# Patient Record
Sex: FEMALE | Race: WHITE | NOT HISPANIC OR LATINO | Employment: OTHER | URBAN - METROPOLITAN AREA
[De-identification: names, ages, dates, MRNs, and addresses within clinical notes are randomized per-mention and may not be internally consistent; named-entity substitution may affect disease eponyms.]

---

## 2017-01-20 RX ORDER — BUPROPION HYDROCHLORIDE 300 MG/1
300 TABLET ORAL EVERY MORNING
Qty: 90 TAB | Refills: 0 | Status: SHIPPED | OUTPATIENT
Start: 2017-01-20 | End: 2017-01-23 | Stop reason: SDUPTHER

## 2017-01-23 RX ORDER — BUPROPION HYDROCHLORIDE 300 MG/1
300 TABLET ORAL EVERY MORNING
Qty: 90 TAB | Refills: 0 | Status: SHIPPED | OUTPATIENT
Start: 2017-01-23 | End: 2017-04-12 | Stop reason: SDUPTHER

## 2017-03-08 ENCOUNTER — OFFICE VISIT (OUTPATIENT)
Dept: BEHAVIORAL HEALTH | Facility: PHYSICIAN GROUP | Age: 52
End: 2017-03-08
Payer: MEDICARE

## 2017-03-08 VITALS — DIASTOLIC BLOOD PRESSURE: 90 MMHG | SYSTOLIC BLOOD PRESSURE: 150 MMHG | HEART RATE: 70 BPM

## 2017-03-08 DIAGNOSIS — F90.9 ATTENTION DEFICIT HYPERACTIVITY DISORDER (ADHD), UNSPECIFIED ADHD TYPE: ICD-10-CM

## 2017-03-08 DIAGNOSIS — F31.81 BIPOLAR II DISORDER (HCC): ICD-10-CM

## 2017-03-08 PROCEDURE — G8484 FLU IMMUNIZE NO ADMIN: HCPCS | Performed by: NURSE PRACTITIONER

## 2017-03-08 PROCEDURE — 3017F COLORECTAL CA SCREEN DOC REV: CPT | Mod: 8P | Performed by: NURSE PRACTITIONER

## 2017-03-08 PROCEDURE — 90833 PSYTX W PT W E/M 30 MIN: CPT | Performed by: NURSE PRACTITIONER

## 2017-03-08 PROCEDURE — G8419 CALC BMI OUT NRM PARAM NOF/U: HCPCS | Performed by: NURSE PRACTITIONER

## 2017-03-08 PROCEDURE — 3014F SCREEN MAMMO DOC REV: CPT | Performed by: NURSE PRACTITIONER

## 2017-03-08 PROCEDURE — 1036F TOBACCO NON-USER: CPT | Performed by: NURSE PRACTITIONER

## 2017-03-08 PROCEDURE — 99213 OFFICE O/P EST LOW 20 MIN: CPT | Performed by: NURSE PRACTITIONER

## 2017-03-08 PROCEDURE — G8432 DEP SCR NOT DOC, RNG: HCPCS | Performed by: NURSE PRACTITIONER

## 2017-03-08 RX ORDER — LAMOTRIGINE 25 MG/1
TABLET ORAL
Qty: 42 TAB | Refills: 1 | Status: SHIPPED | OUTPATIENT
Start: 2017-03-08 | End: 2017-04-12

## 2017-03-08 RX ORDER — DEXTROAMPHETAMINE SACCHARATE, AMPHETAMINE ASPARTATE, DEXTROAMPHETAMINE SULFATE AND AMPHETAMINE SULFATE 2.5; 2.5; 2.5; 2.5 MG/1; MG/1; MG/1; MG/1
10 TABLET ORAL 2 TIMES DAILY
Qty: 60 TAB | Refills: 0 | Status: SHIPPED | OUTPATIENT
Start: 2017-03-08 | End: 2017-04-12 | Stop reason: SDUPTHER

## 2017-03-08 RX ORDER — TRAZODONE HYDROCHLORIDE 50 MG/1
50 TABLET ORAL NIGHTLY PRN
Qty: 90 TAB | Refills: 0 | Status: SHIPPED | OUTPATIENT
Start: 2017-03-08 | End: 2017-04-12

## 2017-03-08 NOTE — PROGRESS NOTES
[facility]  PSYCHIATRIC FOLLOW-UP NOTE    Name: Frances Meyer  MRN: [id number]  : 1965  Age: 51 y.o.  Date of assessment: 3/8/2017  PCP: GABI Borja  Persons in attendance: Patient    REASON FOR VISIT/CHIEF COMPLAINT (as stated by Patient):  Frances Meyer is a 51 y.o., White female, attending follow-up appointment for bipolar disorder and ADHD, having worsening depression.      HISTORY OF PRESENT ILLNESS:    Patient reports she has been feeling very depressed, has evolved over the past few weeks. Low energy and motivation, is not wanting to do like doing anything, isolating more. Concerned she will need to add more medication, verbalizes her fiance is anti-medication and she would like not to need medication. Denies suicidal thoughts. Crying often, again is feeling hopeless. Denies any life changes or stressors that have provoked the depression. Still taking Wellbutrin XL, trazodone, and Adderall.     PSYCHOSOCIAL CHANGES SINCE PREVIOUS CONTACT:  No drinking or drug use    MEDICATION SIDE EFFECTS: none    REVIEW OF SYSTEMS:      General appearance: casually dressed  female, good grooming/hygiene. Appears stated age. Pleasant and cooperative.   Constitutional negative - no fever/chills   Eyes not tested   Ears/Nose/Mouth/Throat not tested   Cardiovascular positive - hypertension today   Respiratory not tested   Gastrointestinal negative - denies reduced appetite or GI upset   Genitourinary not tested   Muscular not tested   Integumentary negative   Neurological negative   Endocrine not tested   Hematologic/Lymphatic not tested       PSYCHIATRIC EXAMINATION/MENTAL STATUS  /90 mmHg  Pulse 70  Breastfeeding? No  Participation: Active verbal participation, Attentive, Engaged and Open to feedback  Grooming:Casual and Neat  Orientation: Fully Oriented  Eye contact: Good  Behavior:Tense   Mood: Depressed  Affect: Congruent with content and Tearful  Thought process:  Logical and Goal-directed  Thought content:  Within normal limits  Speech: Rate within normal limits and Volume within normal limits  Perception:  Within normal limits  Memory:  No gross evidence of memory deficits  Insight: Good  Judgment: Good  Family/couple interaction observations:   Other:    Current risk:    Suicide: Low   Homicide: Not applicable   Self-harm: Not applicable  Relapse: Not applicable  Other:   Crisis Safety Plan reviewed?911/ER for suicidal thoughts  If evidence of imminent risk is present, intervention/plan:    Medical Records/Labs/Diagnostic Tests Reviewed: none    Medical Records/Labs/Diagnostic Tests Ordered: none    DIAGNOSTIC IMPRESSION(S):  1. Bipolar II disorder (CMS-[facility])    2. Attention deficit hyperactivity disorder (ADHD), unspecified ADHD type           ASSESSMENT AND PLAN:  -Decrease dose of Adderall to BID only, patient has now had elevated bp readings at 2 visits; referred to PCP for treatment  -start lamotrigine 25 mg daily, increase to 50 mg daily in 2 weeks. Risks/benefits discussed, patient aware of risk of Stanton Franklin Syndrome, will monitor for rash. Patient took before, was stopped only because she wanted to get off meds.   -rationale for a mood stabilizer explained instead of adding another antidepressant as patient has a bipolar diagnosis  -patient encouraged to start individual talk therapy, can schedule with one of our providers or can look outside within insurance network for earlier availability  -911/ER for suicidal thoughts      Current outpatient prescriptions:   •  trazodone (DESYREL) 50 MG Tab, Take 1 Tab by mouth at bedtime as needed for Sleep (as needed for sleep)., Disp: 90 Tab, Rfl: 0  •  amphetamine-dextroamphetamine (ADDERALL, 10MG,) 10 MG Tab, Take 1 Tab by mouth 2 times a day., Disp: 60 Tab, Rfl: 0  •  lamotrigine (LAMICTAL) 25 MG Tab, Take 1 tab by mouth daily for 2 weeks, then increase to 2 tabs by mouth daily., Disp: 42 Tab, Rfl: 1  •  buPROPion  (WELLBUTRIN XL) 300 MG XL tablet, Take 1 Tab by mouth every morning., Disp: 90 Tab, Rfl: 0  •  levothyroxine (SYNTHROID) 100 MCG TABS, Take 100 mcg by mouth every day., Disp: , Rfl:   •  sumatriptan (IMITREX) 100 MG tablet, Take 100 mg by mouth Once PRN., Disp: , Rfl:       Psychotherapy, 16 minutes: reviewed concept that depression/bipolar disorder should be considered as a physical illness, cannot always make lifestyle changes to relieve it. Patient encouraged to bring in fiance for next visit, can help explain the chemical nature of mental illness. Discussed recurrent depression is not a personal failure, she has not done anything wrong.         Topics addressed include:    DANIEL Hurst.

## 2017-04-12 ENCOUNTER — OFFICE VISIT (OUTPATIENT)
Dept: BEHAVIORAL HEALTH | Facility: PHYSICIAN GROUP | Age: 52
End: 2017-04-12
Payer: MEDICARE

## 2017-04-12 VITALS
DIASTOLIC BLOOD PRESSURE: 86 MMHG | HEART RATE: 89 BPM | WEIGHT: 183 LBS | HEIGHT: 68 IN | BODY MASS INDEX: 27.74 KG/M2 | SYSTOLIC BLOOD PRESSURE: 138 MMHG

## 2017-04-12 DIAGNOSIS — F90.9 ATTENTION DEFICIT HYPERACTIVITY DISORDER (ADHD), UNSPECIFIED ADHD TYPE: ICD-10-CM

## 2017-04-12 DIAGNOSIS — F31.81 BIPOLAR II DISORDER (HCC): ICD-10-CM

## 2017-04-12 PROCEDURE — G8419 CALC BMI OUT NRM PARAM NOF/U: HCPCS | Performed by: NURSE PRACTITIONER

## 2017-04-12 PROCEDURE — 99213 OFFICE O/P EST LOW 20 MIN: CPT | Performed by: NURSE PRACTITIONER

## 2017-04-12 PROCEDURE — 3014F SCREEN MAMMO DOC REV: CPT | Performed by: NURSE PRACTITIONER

## 2017-04-12 PROCEDURE — G8432 DEP SCR NOT DOC, RNG: HCPCS | Performed by: NURSE PRACTITIONER

## 2017-04-12 PROCEDURE — 3017F COLORECTAL CA SCREEN DOC REV: CPT | Mod: 8P | Performed by: NURSE PRACTITIONER

## 2017-04-12 PROCEDURE — 90833 PSYTX W PT W E/M 30 MIN: CPT | Performed by: NURSE PRACTITIONER

## 2017-04-12 PROCEDURE — 1036F TOBACCO NON-USER: CPT | Performed by: NURSE PRACTITIONER

## 2017-04-12 RX ORDER — TRAZODONE HYDROCHLORIDE 100 MG/1
50-100 TABLET ORAL NIGHTLY PRN
Qty: 30 TAB | Refills: 2 | Status: SHIPPED | OUTPATIENT
Start: 2017-04-12 | End: 2017-04-24 | Stop reason: SDUPTHER

## 2017-04-12 RX ORDER — LAMOTRIGINE 100 MG/1
100 TABLET ORAL DAILY
Qty: 30 TAB | Refills: 2 | Status: SHIPPED | OUTPATIENT
Start: 2017-04-12 | End: 2017-04-24 | Stop reason: SDUPTHER

## 2017-04-12 RX ORDER — BUPROPION HYDROCHLORIDE 300 MG/1
300 TABLET ORAL EVERY MORNING
Qty: 90 TAB | Refills: 0 | Status: SHIPPED | OUTPATIENT
Start: 2017-04-12 | End: 2017-05-17 | Stop reason: SDUPTHER

## 2017-04-12 RX ORDER — DEXTROAMPHETAMINE SACCHARATE, AMPHETAMINE ASPARTATE, DEXTROAMPHETAMINE SULFATE AND AMPHETAMINE SULFATE 2.5; 2.5; 2.5; 2.5 MG/1; MG/1; MG/1; MG/1
10 TABLET ORAL 2 TIMES DAILY
Qty: 60 TAB | Refills: 0 | Status: SHIPPED | OUTPATIENT
Start: 2017-04-12 | End: 2017-05-17 | Stop reason: SDUPTHER

## 2017-04-12 NOTE — PROGRESS NOTES
"[facility]  PSYCHIATRIC FOLLOW-UP NOTE    Name: Frances Meyer  MRN: [id number]  : 1965  Age: 51 y.o.  Date of assessment: 2017  PCP: GABI Borja  Persons in attendance: Patient    REASON FOR VISIT/CHIEF COMPLAINT (as stated by Patient):  Frances Meyer is a 51 y.o., White female, attending follow-up appointment for bipolar depression, ADHD.      HISTORY OF PRESENT ILLNESS:    Patient's mood is improving, reports the past month has been rough with depression but she is again getting out of the house and feeling more motivated. Denies suicidal thoughts. Sleep has been a problem, fiance works at night and she has ended up sleeping some during the day because she is not sleeping well at night and is exhausted. Tolerating lamotrigine well. Notices decreased effectiveness with reduced Adderall dose, but is managing with focus okay on the 10 mg BID. States her mood is not great, but that she has definitely seen improvement.     PSYCHOSOCIAL CHANGES SINCE PREVIOUS CONTACT:  No drinking or drug use    MEDICATION SIDE EFFECTS: denies    REVIEW OF SYSTEMS:    General appearance:  female,  Good grooming/hygiene. Pleasant and cooperative.   Constitutional negative - no fever/chills   Eyes not tested   Ears/Nose/Mouth/Throat positive - upper respiratory infection   Cardiovascular negative   Respiratory positive - cough, reports viral   Gastrointestinal negative   Genitourinary nottested   Muscular negative   Integumentary Denies rash   Neurological negative   Endocrine nottested   Hematologic/Lymphatic Not tested       PSYCHIATRIC EXAMINATION/MENTAL STATUS  /86 mmHg  Pulse 89  Ht 1.727 m (5' 7.99\")  Wt 83.008 kg (183 lb)  BMI 27.83 kg/m2  Breastfeeding? No  Participation: Active verbal participation, Attentive, Engaged and Open to feedback  Grooming:Casual and Neat  Orientation: Fully Oriented  Eye contact: Good  Behavior:Calm   Mood: Euthymic  Affect: Full range and " Congruent with content  Thought process: Logical and Goal-directed  Thought content:  Within normal limits  Speech: Rate within normal limits and Volume within normal limits  Perception:  Within normal limits  Memory:  No gross evidence of memory deficits  Insight: Good  Judgment: Good  Family/couple interaction observations:   Other:    Current risk:    Suicide: Low   Homicide: Not applicable   Self-harm: Not applicable  Relapse: Not applicable  Other:   Crisis Safety Plan reviewed?911/ER for suicidal thoughts  If evidence of imminent risk is present, intervention/plan:    Medical Records/Labs/Diagnostic Tests Reviewed: none    Medical Records/Labs/Diagnostic Tests Ordered: none    DIAGNOSTIC IMPRESSION(S):  1. Bipolar II disorder (CMS-HCC)    2. Attention deficit hyperactivity disorder (ADHD), unspecified ADHD type           ASSESSMENT AND PLAN:  Increase lamotrigine, patient is tolerating well and depression is improving.   Increase trazodone to 100 mg at night, encourage patient to stay out of bed during day to reset her sleep schedule.  Continue Adderall 10 mg BID, will continue to monitor blood pressure.       Current outpatient prescriptions:   •  trazodone (DESYREL) 100 MG Tab, Take 0.5-1 Tabs by mouth at bedtime as needed for Sleep., Disp: 30 Tab, Rfl: 2  •  lamotrigine (LAMICTAL) 100 MG Tab, Take 1 Tab by mouth every day., Disp: 30 Tab, Rfl: 2  •  amphetamine-dextroamphetamine (ADDERALL, 10MG,) 10 MG Tab, Take 1 Tab by mouth 2 times a day., Disp: 60 Tab, Rfl: 0  •  buPROPion (WELLBUTRIN XL) 300 MG XL tablet, Take 1 Tab by mouth every morning., Disp: 90 Tab, Rfl: 0  •  Pseudoeph-Doxylamine-DM-APAP (NYQUIL PO), Take  by mouth., Disp: , Rfl:   •  levothyroxine (SYNTHROID) 100 MCG TABS, Take 100 mcg by mouth every day., Disp: , Rfl:   •  sumatriptan (IMITREX) 100 MG tablet, Take 100 mg by mouth Once PRN., Disp: , Rfl:       Psychotherapy, 16 minutes: processed she has not had contact with daughter since  December, is the longest time she goes. Patient would like to contact her, but does not feel she can accept rejection or get no response from daughter at this time. Looking forward to her wedding, focusing on wedding planning the next couple of months.      Topics addressed include:    DANIEL Hurst.

## 2017-04-24 RX ORDER — TRAZODONE HYDROCHLORIDE 100 MG/1
50-100 TABLET ORAL NIGHTLY PRN
Qty: 30 TAB | Refills: 2 | Status: SHIPPED | OUTPATIENT
Start: 2017-04-24 | End: 2017-07-18 | Stop reason: SDUPTHER

## 2017-04-24 RX ORDER — LAMOTRIGINE 100 MG/1
100 TABLET ORAL DAILY
Qty: 30 TAB | Refills: 2 | Status: SHIPPED | OUTPATIENT
Start: 2017-04-24 | End: 2017-05-17

## 2017-05-09 ENCOUNTER — OFFICE VISIT (OUTPATIENT)
Dept: MEDICAL GROUP | Facility: PHYSICIAN GROUP | Age: 52
End: 2017-05-09
Payer: MEDICARE

## 2017-05-09 VITALS
OXYGEN SATURATION: 94 % | HEART RATE: 94 BPM | DIASTOLIC BLOOD PRESSURE: 90 MMHG | BODY MASS INDEX: 29.35 KG/M2 | SYSTOLIC BLOOD PRESSURE: 146 MMHG | WEIGHT: 187 LBS | TEMPERATURE: 97.9 F | HEIGHT: 67 IN

## 2017-05-09 DIAGNOSIS — I10 ESSENTIAL HYPERTENSION: ICD-10-CM

## 2017-05-09 DIAGNOSIS — R73.01 ELEVATED FASTING GLUCOSE: ICD-10-CM

## 2017-05-09 DIAGNOSIS — E78.2 MIXED HYPERLIPIDEMIA: ICD-10-CM

## 2017-05-09 PROCEDURE — 3017F COLORECTAL CA SCREEN DOC REV: CPT | Mod: 8P | Performed by: FAMILY MEDICINE

## 2017-05-09 PROCEDURE — G8419 CALC BMI OUT NRM PARAM NOF/U: HCPCS | Performed by: FAMILY MEDICINE

## 2017-05-09 PROCEDURE — 99204 OFFICE O/P NEW MOD 45 MIN: CPT | Performed by: FAMILY MEDICINE

## 2017-05-09 PROCEDURE — 3014F SCREEN MAMMO DOC REV: CPT | Performed by: FAMILY MEDICINE

## 2017-05-09 PROCEDURE — 1036F TOBACCO NON-USER: CPT | Performed by: FAMILY MEDICINE

## 2017-05-09 PROCEDURE — G8432 DEP SCR NOT DOC, RNG: HCPCS | Performed by: FAMILY MEDICINE

## 2017-05-09 RX ORDER — LISINOPRIL 10 MG/1
10 TABLET ORAL DAILY
Qty: 90 TAB | Refills: 1 | Status: SHIPPED | OUTPATIENT
Start: 2017-05-09 | End: 2017-11-12 | Stop reason: SDUPTHER

## 2017-05-09 NOTE — ASSESSMENT & PLAN NOTE
Ongoing issue. Patient reports that she has had high blood pressure for many months now was never started on medication. Patient reports that she is very concerned because her blood pressure keeps going up every time she goes for another doctor's visit. She currently is denying any headache, dizziness, chest pain. Chart review shows that her blood pressure is above the recommended range for her age.

## 2017-05-09 NOTE — PROGRESS NOTES
Frances Meyer is a 51 y.o. female here for lipids; elevated glucose; HTN  HPI:  Patient is here today to establish care; she is a pleasant 51-year-old female who presents with the following concerns:  Mixed hyperlipidemia  Long-standing history. Patient reports that she was diagnosed with hyperlipidemia months ago but was never started on medication. She was told by previous provider that if she just got her diabetes under control that the cholesterol would fix itself. Patient is very concerned; she does not remember how high her cholesterol was.    Essential hypertension  Ongoing issue. Patient reports that she has had high blood pressure for many months now was never started on medication. Patient reports that she is very concerned because her blood pressure keeps going up every time she goes for another doctor's visit. She currently is denying any headache, dizziness, chest pain. Chart review shows that her blood pressure is above the recommended range for her age.    Elevated fasting glucose  Ongoing issue. Patient reports that she was actually diagnosed with diabetes but does not remember what her A1c was. She does her murmur fasting blood sugars high as 180. Again, her previous provider never put her on medication and told her to just make lifestyle adjustments first. She reports that she did make a strong attempt but only was able to get her blood sugar down to 140. She is very concerned because she is started as some visual changes along with some pins and needle sensation on the bottoms of both of her feet. She does have a family history of diabetes.    Current medicines (including changes today)  Current Outpatient Prescriptions   Medication Sig Dispense Refill   • lisinopril (PRINIVIL) 10 MG Tab Take 1 Tab by mouth every day. 90 Tab 1   • trazodone (DESYREL) 100 MG Tab Take 0.5-1 Tabs by mouth at bedtime as needed for Sleep. 30 Tab 2   • lamotrigine (LAMICTAL) 100 MG Tab Take 1 Tab by mouth every day.  "30 Tab 2   • amphetamine-dextroamphetamine (ADDERALL, 10MG,) 10 MG Tab Take 1 Tab by mouth 2 times a day. 60 Tab 0   • buPROPion (WELLBUTRIN XL) 300 MG XL tablet Take 1 Tab by mouth every morning. 90 Tab 0   • Pseudoeph-Doxylamine-DM-APAP (NYQUIL PO) Take  by mouth.     • levothyroxine (SYNTHROID) 100 MCG TABS Take 100 mcg by mouth every day.     • sumatriptan (IMITREX) 100 MG tablet Take 100 mg by mouth Once PRN.       No current facility-administered medications for this visit.     She  has a past medical history of Chronic back pain; Bipolar 1 disorder (CMS-[facility]); Depression; Migraine; Thyroid disease; ADD (attention deficit disorder); Insomnia; Diabetes (CMS-[facility]); and Bipolar disorder (CMS-[facility]).  She  has past surgical history that includes abdominal hysterectomy total and primary c section.  Social History   Substance Use Topics   • Smoking status: Former Smoker     Quit date: 08/23/2016   • Smokeless tobacco: Never Used   • Alcohol Use: Yes      Comment: holidays     Social History     Social History Narrative     Family History   Problem Relation Age of Onset   • Bipolar disorder Father    • Alcohol abuse Father    • Alcohol abuse Maternal Grandmother    • Diabetes Mother    • No Known Problems Sister    • Diabetes Maternal Grandfather      Family Status   Relation Status Death Age   • Father Alive    • Mother Alive    • Sister Alive          ROS  +pain on the bottom of both feet  All other systems reviewed and are negative     Objective:     Blood pressure 146/90, pulse 94, temperature 36.6 °C (97.9 °F), height 1.702 m (5' 7\"), weight 84.823 kg (187 lb), SpO2 94 %. Body mass index is 29.28 kg/(m^2).  Physical Exam:    Constitutional: Alert, no distress.  Skin: Warm, dry, good turgor, no rashes in visible areas.  Eye: Equal, round and reactive, conjunctiva clear, lids normal.  ENMT: Lips without lesions, good dentition, oropharynx clear.  Neck: Trachea midline, no masses, no thyromegaly. No cervical or " supraclavicular lymphadenopathy.  Respiratory: Unlabored respiratory effort, lungs clear to auscultation, no wheezes, no ronchi.  Cardiovascular: Normal S1, S2, no murmur, no edema.  Abdomen: Soft, non-tender, no masses, no hepatosplenomegaly.  Psych: Alert and oriented x3, normal affect and mood.  Neuro: CN 2-12 grossly intact      Assessment and Plan:   The following treatment plan was discussed     1. Elevated fasting glucose  COMP METABOLIC PANEL    HEMOGLOBIN A1C    VITAMIN D,25 HYDROXY    Uncontrolled. Send for labs and monitor for results. Will probably need to consider starting her on metformin at next visit   2. Mixed hyperlipidemia  LIPID PROFILE    Uncontrolled. Sent for labs and monitor for results. We'll need to consider statin therapy at next visit.   3. Essential hypertension  MICROALBUMIN CREAT RATIO URINE    Uncontrolled. Start patient on lisinopril 10 mg daily; monitor for tolerance and effect        Records requested.  Followup: Return in about 10 days (around 5/19/2017) for review labs/HTN, Short.

## 2017-05-09 NOTE — ASSESSMENT & PLAN NOTE
Long-standing history. Patient reports that she was diagnosed with hyperlipidemia months ago but was never started on medication. She was told by previous provider that if she just got her diabetes under control that the cholesterol would fix itself. Patient is very concerned; she does not remember how high her cholesterol was.

## 2017-05-09 NOTE — ASSESSMENT & PLAN NOTE
Ongoing issue. Patient reports that she was actually diagnosed with diabetes but does not remember what her A1c was. She does her murmur fasting blood sugars high as 180. Again, her previous provider never put her on medication and told her to just make lifestyle adjustments first. She reports that she did make a strong attempt but only was able to get her blood sugar down to 140. She is very concerned because she is started as some visual changes along with some pins and needle sensation on the bottoms of both of her feet. She does have a family history of diabetes.

## 2017-05-09 NOTE — MR AVS SNAPSHOT
"        Frances Betsy   2017 2:00 PM   Office Visit   MRN: [id number]    Department:  [facility]   Dept Phone:  [phone]    Description:  Female : 1965   Provider:  Sandrita Sam D.O.           Reason for Visit     Establish Care est care     Medication Management meds for diabetes & cholesterol & high blood pressure      Allergies as of 2017     No Known Allergies      You were diagnosed with     Elevated fasting glucose   [601592]       Mixed hyperlipidemia   [272.2.ICD-9-CM]       Essential hypertension   [5701496]         Vital Signs     Blood Pressure Pulse Temperature Height Weight Body Mass Index    146/90 mmHg 94 36.6 °C (97.9 °F) 1.702 m (5' 7\") 84.823 kg (187 lb) 29.28 kg/m2    Oxygen Saturation Smoking Status                94% Former Smoker          Basic Information     Date Of Birth Sex Race Ethnicity Preferred Language    1965 Female White Non- English      Your appointments     May 17, 2017  2:00 PM   Follow Up Med Management with CRISTINA Hurst   [facility] [address] [place] [address])    [address] [place] [id number]   [phone]            May 23, 2017  9:40 AM   Established Patient with Sandrita Sam D.O.   [address] [id number] [phone]           You will be receiving a confirmation call a few days before your appointment from our automated call confirmation system.              Problem List              ICD-10-CM Priority Class Noted - Resolved    Bipolar II disorder (CMS-HCC) F31.81   2016 - Present    Attention deficit hyperactivity disorder (ADHD) F90.9   2016 - Present    Elevated fasting glucose R73.01   2017 - Present    Mixed hyperlipidemia E78.2   2017 - Present    Essential hypertension I10   2017 - Present      Health Maintenance        Date Due Completion Dates    IMM DTaP/Tdap/Td Vaccine (1 - Tdap) 1982 ---    PAP SMEAR " 9/14/1984 ---    COLONOSCOPY 9/14/2013 ---    MAMMOGRAM 11/8/2017 11/8/2016, 10/24/2016            Current Immunizations     No immunizations on file.      Below and/or attached are the medications your provider expects you to take. Review all of your home medications and newly ordered medications with your provider and/or pharmacist. Follow medication instructions as directed by your provider and/or pharmacist. Please keep your medication list with you and share with your provider. Update the information when medications are discontinued, doses are changed, or new medications (including over-the-counter products) are added; and carry medication information at all times in the event of emergency situations     Allergies:  No Known Allergies          Medications  Valid as of: May 09, 2017 -  2:25 PM    Generic Name Brand Name Tablet Size Instructions for use    Amphetamine-Dextroamphetamine (Tab) ADDERALL 10 MG Take 1 Tab by mouth 2 times a day.        BuPROPion HCl (TABLET SR 24 HR) WELLBUTRIN  MG Take 1 Tab by mouth every morning.        LamoTRIgine (Tab) LAMICTAL 100 MG Take 1 Tab by mouth every day.        Levothyroxine Sodium (Tab) SYNTHROID 100 MCG Take 100 mcg by mouth every day.        Lisinopril (Tab) PRINIVIL 10 MG Take 1 Tab by mouth every day.        Pseudoeph-Doxylamine-DM-APAP   Take  by mouth.        SUMAtriptan Succinate (Tab) IMITREX 100 MG Take 100 mg by mouth Once PRN.        TraZODone HCl (Tab) DESYREL 100 MG Take 0.5-1 Tabs by mouth at bedtime as needed for Sleep.        .                 Medicines prescribed today were sent to:     [facility] #[id number] - JEANETTE RIOS - [address] [place] [address] [place] AT [address] [place] [address]    Phone: [phone] Fax: [phone]    Open 24 Hours?: No      Medication refill instructions:       If your prescription bottle indicates you have medication refills left, it is not necessary to call your provider’s office. Please contact  your pharmacy and they will refill your medication.    If your prescription bottle indicates you do not have any refills left, you may request refills at any time through one of the following ways: The online StyleSeek system (except Urgent Care), by calling your provider’s office, or by asking your pharmacy to contact your provider’s office with a refill request. Medication refills are processed only during regular business hours and may not be available until the next business day. Your provider may request additional information or to have a follow-up visit with you prior to refilling your medication.   *Please Note: Medication refills are assigned a new Rx number when refilled electronically. Your pharmacy may indicate that no refills were authorized even though a new prescription for the same medication is available at the pharmacy. Please request the medicine by name with the pharmacy before contacting your provider for a refill.        Your To Do List     Future Labs/Procedures Complete By Expires    COMP METABOLIC PANEL  As directed 5/10/2018    HEMOGLOBIN A1C  As directed 5/10/2018    LIPID PROFILE  As directed 5/10/2018    MICROALBUMIN CREAT RATIO URINE  As directed 5/10/2018    VITAMIN D,25 HYDROXY  As directed 5/10/2018         StyleSeek Access Code: [email or web address]  Expires: 5/25/2017  1:01 PM    StyleSeek  A secure, online tool to manage your health information     [employer]’s StyleSeek® is a secure, online tool that connects you to your personalized health information from the privacy of your home -- day or night - making it very easy for you to manage your healthcare. Once the activation process is completed, you can even access your medical information using the StyleSeek manuel, which is available for free in the Apple Manuel store or Google Play store.     StyleSeek provides the following levels of access (as shown below):   My Chart Features   [facility] Doctor [facility]  [facility]  Urgent  Care [facility]  Primary Care  Doctor   Email your healthcare team securely and privately 24/7 X X X    Manage appointments: schedule your next appointment; view details of past/upcoming appointments X      Request prescription refills. X      View recent personal medical records, including lab and immunizations X X X X   View health record, including health history, allergies, medications X X X X   Read reports about your outpatient visits, procedures, consult and ER notes X X X X   See your discharge summary, which is a recap of your hospital and/or ER visit that includes your diagnosis, lab results, and care plan. X X       How to register for [employer]:  1. Go to  [email or web address].  2. Click on the Sign Up Now box, which takes you to the New Member Sign Up page. You will need to provide the following information:  a. Enter your Goodoc Access Code exactly as it appears at the top of this page. (You will not need to use this code after you’ve completed the sign-up process. If you do not sign up before the expiration date, you must request a new code.)   b. Enter your date of birth.   c. Enter your home email address.   d. Click Submit, and follow the next screen’s instructions.  3. Create a Goodoc ID. This will be your Goodoc login ID and cannot be changed, so think of one that is secure and easy to remember.  4. Create a Goodoc password. You can change your password at any time.  5. Enter your Password Reset Question and Answer. This can be used at a later time if you forget your password.   6. Enter your e-mail address. This allows you to receive e-mail notifications when new information is available in Goodoc.  7. Click Sign Up. You can now view your health information.    For assistance activating your Good[employer] account, call [phone]

## 2017-05-17 ENCOUNTER — OFFICE VISIT (OUTPATIENT)
Dept: BEHAVIORAL HEALTH | Facility: PHYSICIAN GROUP | Age: 52
End: 2017-05-17
Payer: MEDICARE

## 2017-05-17 DIAGNOSIS — F31.81 BIPOLAR II DISORDER (HCC): ICD-10-CM

## 2017-05-17 DIAGNOSIS — F90.9 ATTENTION DEFICIT HYPERACTIVITY DISORDER (ADHD), UNSPECIFIED ADHD TYPE: ICD-10-CM

## 2017-05-17 PROCEDURE — 99213 OFFICE O/P EST LOW 20 MIN: CPT | Performed by: NURSE PRACTITIONER

## 2017-05-17 PROCEDURE — 90833 PSYTX W PT W E/M 30 MIN: CPT | Performed by: NURSE PRACTITIONER

## 2017-05-17 PROCEDURE — 3014F SCREEN MAMMO DOC REV: CPT | Performed by: NURSE PRACTITIONER

## 2017-05-17 PROCEDURE — G8419 CALC BMI OUT NRM PARAM NOF/U: HCPCS | Performed by: NURSE PRACTITIONER

## 2017-05-17 PROCEDURE — 1036F TOBACCO NON-USER: CPT | Performed by: NURSE PRACTITIONER

## 2017-05-17 PROCEDURE — G8432 DEP SCR NOT DOC, RNG: HCPCS | Performed by: NURSE PRACTITIONER

## 2017-05-17 PROCEDURE — 3017F COLORECTAL CA SCREEN DOC REV: CPT | Mod: 8P | Performed by: NURSE PRACTITIONER

## 2017-05-17 RX ORDER — LAMOTRIGINE 150 MG/1
150 TABLET ORAL DAILY
Qty: 90 TAB | Refills: 0 | Status: SHIPPED | OUTPATIENT
Start: 2017-05-17 | End: 2017-08-16

## 2017-05-17 RX ORDER — DEXTROAMPHETAMINE SACCHARATE, AMPHETAMINE ASPARTATE, DEXTROAMPHETAMINE SULFATE AND AMPHETAMINE SULFATE 2.5; 2.5; 2.5; 2.5 MG/1; MG/1; MG/1; MG/1
10 TABLET ORAL EVERY MORNING
Qty: 30 TAB | Refills: 0 | Status: SHIPPED | OUTPATIENT
Start: 2017-06-13 | End: 2017-08-16 | Stop reason: SDUPTHER

## 2017-05-17 RX ORDER — DEXTROAMPHETAMINE SACCHARATE, AMPHETAMINE ASPARTATE, DEXTROAMPHETAMINE SULFATE AND AMPHETAMINE SULFATE 2.5; 2.5; 2.5; 2.5 MG/1; MG/1; MG/1; MG/1
10 TABLET ORAL 2 TIMES DAILY
Qty: 60 TAB | Refills: 0 | Status: SHIPPED | OUTPATIENT
Start: 2017-05-17 | End: 2017-10-11

## 2017-05-17 RX ORDER — BUPROPION HYDROCHLORIDE 300 MG/1
TABLET ORAL
Qty: 90 TAB | Refills: 0 | Status: SHIPPED | OUTPATIENT
Start: 2017-05-17 | End: 2017-09-17 | Stop reason: SDUPTHER

## 2017-05-17 RX ORDER — DEXTROAMPHETAMINE SACCHARATE, AMPHETAMINE ASPARTATE, DEXTROAMPHETAMINE SULFATE AND AMPHETAMINE SULFATE 2.5; 2.5; 2.5; 2.5 MG/1; MG/1; MG/1; MG/1
10 TABLET ORAL EVERY MORNING
Qty: 30 TAB | Refills: 0 | Status: SHIPPED | OUTPATIENT
Start: 2017-05-13 | End: 2017-05-17 | Stop reason: SDUPTHER

## 2017-05-17 NOTE — PROGRESS NOTES
[facility]  PSYCHIATRIC FOLLOW-UP NOTE    Name: Frances Meyer  MRN: [id number]  : 1965  Age: 51 y.o.  Date of assessment: 2017  PCP: Sandrita Sam D.O.  Persons in attendance: Patient    REASON FOR VISIT/CHIEF COMPLAINT (as stated by Patient):  Frances Meyer is a 51 y.o., White female, attending follow-up appointment for bipolar disorder, ADHD.      HISTORY OF PRESENT ILLNESS:    Patient reports her mood has improved, no longer having bouts of crying or extreme emotions. Still having some lingering feelings of sadness however. Sleeping well at night. No problems with medications, is tolerating them well. Patient has concerns about running out of month supply of meds while on vacation. Getting excited about upcoming time with her friends, and her upcoming wedding. Still has some difficulty leaving the house, feels frustrated because nothing bad happens to her when she goes out, she just feels like she can't; verbalizes while on sertraline things were better with getting out but that she doesn't want to take sertraline again.     PSYCHOSOCIAL CHANGES SINCE PREVIOUS CONTACT:  No changes, getting  next month.      MEDICATION SIDE EFFECTS: denies    REVIEW OF SYSTEMS:  General appearance: casually dressed  female, good grooming/hygiene. Pleasant and cooperative, smiles often.       Constitutional negative - no fever/chills   Eyes not tested   Ears/Nose/Mouth/Throat not tested   Cardiovascular positive - taking lisinopril now for hypertension   Respiratory not tested   Gastrointestinal not tested   Genitourinary not tested   Muscular not tested   Integumentary No rash or skin concerns   Neurological negative   Endocrine not tested   Hematologic/Lymphatic not tested       PSYCHIATRIC EXAMINATION/MENTAL STATUS  There were no vitals taken for this visit.  Participation: Active verbal participation, Attentive, Engaged and Open to feedback  Grooming:Casual and  Neat  Orientation: Fully Oriented  Eye contact: Good  Behavior:Calm   Mood: Euthymic  Affect: Congruent with content  Thought process: Logical and Goal-directed  Thought content:  Within normal limits  Speech: Rate within normal limits and Volume within normal limits  Perception:  Within normal limits  Memory:  No gross evidence of memory deficits  Insight: Good  Judgment: Good  Family/couple interaction observations:   Other:    Current risk:    Suicide: Low   Homicide: Not applicable   Self-harm: Not applicable  Relapse: Not applicable  Other:   Crisis Safety Plan reviewed?n/a  If evidence of imminent risk is present, intervention/plan:    Medical Records/Labs/Diagnostic Tests Reviewed: none    Medical Records/Labs/Diagnostic Tests Ordered: none    DIAGNOSTIC IMPRESSION(S):  1. Bipolar II disorder (CMS-[facility])    2. Attention deficit hyperactivity disorder (ADHD), unspecified ADHD type           ASSESSMENT AND PLAN:  Increase lamotrigine to 150 mg daily for ongoing depression. Discussed with patient SSRIs would likely be useful for agoraphobic symptoms, but patient does not want to add another medication and did not like how she felt taking sertraline. Will order 90 days of lamotrigine and bupropion, patient reminded drinking alcohol with medications is not safe. Follow up in 2 months or sooner if needed.    Current Outpatient Prescriptions   Medication Sig Dispense Refill   • buPROPion (WELLBUTRIN XL) 300 MG XL tablet TAKE 1 TABLET BY MOUTH EVERY MORNING 90 Tab 0   • lamotrigine (LAMICTAL) 150 MG tablet Take 1 Tab by mouth every day. 90 Tab 0   • amphetamine-dextroamphetamine (ADDERALL, 10MG,) 10 MG Tab Take 1 Tab by mouth 2 times a day. 60 Tab 0   • [START ON 6/13/2017] amphetamine-dextroamphetamine (ADDERALL, 10MG,) 10 MG Tab Take 1 Tab by mouth every morning. 30 Tab 0   • lisinopril (PRINIVIL) 10 MG Tab Take 1 Tab by mouth every day. 90 Tab 1   • trazodone (DESYREL) 100 MG Tab Take 0.5-1 Tabs by mouth at bedtime  as needed for Sleep. 30 Tab 2   • levothyroxine (SYNTHROID) 100 MCG TABS Take 100 mcg by mouth every day.     • sumatriptan (IMITREX) 100 MG tablet Take 100 mg by mouth Once PRN.       No current facility-administered medications for this visit.         Psychotherapy, 20 minutes: patient processed feelings of anger, daughter did not acknowledge mother's day. Patient makes comparisons between relationship problems with her own mother in the past and where they are now with that of her and her own daughter.  Encourage patient to focus thoughts on things going well for her personally, not to dwell on her daughter as she cannot control her behavior.      Follow up in 2 months or sooner if needed      DANIEL Hurst.

## 2017-05-18 ENCOUNTER — HOSPITAL ENCOUNTER (OUTPATIENT)
Dept: LAB | Facility: MEDICAL CENTER | Age: 52
End: 2017-05-18
Attending: FAMILY MEDICINE
Payer: MEDICARE

## 2017-05-18 DIAGNOSIS — I10 ESSENTIAL HYPERTENSION: ICD-10-CM

## 2017-05-18 DIAGNOSIS — R73.01 ELEVATED FASTING GLUCOSE: ICD-10-CM

## 2017-05-18 DIAGNOSIS — E78.2 MIXED HYPERLIPIDEMIA: ICD-10-CM

## 2017-05-18 LAB
25(OH)D3 SERPL-MCNC: 59 NG/ML (ref 30–100)
ALBUMIN SERPL BCP-MCNC: 4.5 G/DL (ref 3.2–4.9)
ALBUMIN/GLOB SERPL: 2 G/DL
ALP SERPL-CCNC: 75 U/L (ref 30–99)
ALT SERPL-CCNC: 73 U/L (ref 2–50)
ANION GAP SERPL CALC-SCNC: 8 MMOL/L (ref 0–11.9)
AST SERPL-CCNC: 35 U/L (ref 12–45)
BILIRUB SERPL-MCNC: 0.6 MG/DL (ref 0.1–1.5)
BUN SERPL-MCNC: 12 MG/DL (ref 8–22)
CALCIUM SERPL-MCNC: 9.8 MG/DL (ref 8.5–10.5)
CHLORIDE SERPL-SCNC: 102 MMOL/L (ref 96–112)
CHOLEST SERPL-MCNC: 195 MG/DL (ref 100–199)
CO2 SERPL-SCNC: 26 MMOL/L (ref 20–33)
CREAT SERPL-MCNC: 0.84 MG/DL (ref 0.5–1.4)
CREAT UR-MCNC: 212.2 MG/DL
EST. AVERAGE GLUCOSE BLD GHB EST-MCNC: 140 MG/DL
GFR SERPL CREATININE-BSD FRML MDRD: >60 ML/MIN/1.73 M 2
GLOBULIN SER CALC-MCNC: 2.3 G/DL (ref 1.9–3.5)
GLUCOSE SERPL-MCNC: 112 MG/DL (ref 65–99)
HBA1C MFR BLD: 6.5 % (ref 0–5.6)
HDLC SERPL-MCNC: 41 MG/DL
LDLC SERPL CALC-MCNC: 131 MG/DL
MICROALBUMIN UR-MCNC: <0.7 MG/DL
MICROALBUMIN/CREAT UR: NORMAL MG/G (ref 0–30)
POTASSIUM SERPL-SCNC: 3.9 MMOL/L (ref 3.6–5.5)
PROT SERPL-MCNC: 6.8 G/DL (ref 6–8.2)
SODIUM SERPL-SCNC: 136 MMOL/L (ref 135–145)
TRIGL SERPL-MCNC: 117 MG/DL (ref 0–149)

## 2017-05-18 PROCEDURE — 80053 COMPREHEN METABOLIC PANEL: CPT

## 2017-05-18 PROCEDURE — 82043 UR ALBUMIN QUANTITATIVE: CPT

## 2017-05-18 PROCEDURE — 82570 ASSAY OF URINE CREATININE: CPT

## 2017-05-18 PROCEDURE — 36415 COLL VENOUS BLD VENIPUNCTURE: CPT

## 2017-05-18 PROCEDURE — 83036 HEMOGLOBIN GLYCOSYLATED A1C: CPT | Mod: GA

## 2017-05-18 PROCEDURE — 80061 LIPID PANEL: CPT

## 2017-05-18 PROCEDURE — 82306 VITAMIN D 25 HYDROXY: CPT | Mod: GA

## 2017-05-23 ENCOUNTER — OFFICE VISIT (OUTPATIENT)
Dept: MEDICAL GROUP | Facility: PHYSICIAN GROUP | Age: 52
End: 2017-05-23
Payer: MEDICARE

## 2017-05-23 VITALS
OXYGEN SATURATION: 97 % | HEART RATE: 77 BPM | HEIGHT: 67 IN | SYSTOLIC BLOOD PRESSURE: 118 MMHG | WEIGHT: 187 LBS | DIASTOLIC BLOOD PRESSURE: 78 MMHG | TEMPERATURE: 97.4 F | BODY MASS INDEX: 29.35 KG/M2

## 2017-05-23 DIAGNOSIS — E78.2 MIXED HYPERLIPIDEMIA: ICD-10-CM

## 2017-05-23 DIAGNOSIS — R79.89 ELEVATED LFTS: ICD-10-CM

## 2017-05-23 DIAGNOSIS — R73.01 ELEVATED FASTING GLUCOSE: ICD-10-CM

## 2017-05-23 DIAGNOSIS — I10 ESSENTIAL HYPERTENSION: ICD-10-CM

## 2017-05-23 DIAGNOSIS — M54.50 CHRONIC BILATERAL LOW BACK PAIN WITHOUT SCIATICA: ICD-10-CM

## 2017-05-23 DIAGNOSIS — G89.29 CHRONIC BILATERAL LOW BACK PAIN WITHOUT SCIATICA: ICD-10-CM

## 2017-05-23 PROCEDURE — 3014F SCREEN MAMMO DOC REV: CPT | Performed by: FAMILY MEDICINE

## 2017-05-23 PROCEDURE — 1036F TOBACCO NON-USER: CPT | Performed by: FAMILY MEDICINE

## 2017-05-23 PROCEDURE — 99214 OFFICE O/P EST MOD 30 MIN: CPT | Performed by: FAMILY MEDICINE

## 2017-05-23 PROCEDURE — 3017F COLORECTAL CA SCREEN DOC REV: CPT | Mod: 8P | Performed by: FAMILY MEDICINE

## 2017-05-23 PROCEDURE — G8419 CALC BMI OUT NRM PARAM NOF/U: HCPCS | Performed by: FAMILY MEDICINE

## 2017-05-23 PROCEDURE — G8432 DEP SCR NOT DOC, RNG: HCPCS | Performed by: FAMILY MEDICINE

## 2017-05-23 ASSESSMENT — PATIENT HEALTH QUESTIONNAIRE - PHQ9: CLINICAL INTERPRETATION OF PHQ2 SCORE: 0

## 2017-05-23 NOTE — ASSESSMENT & PLAN NOTE
Ongoing issue. Fasting blood sugar was at 112 with a hemoglobin A1c of 6.5. Patient reports that this is significantly improved from her previous blood work with another provider.

## 2017-05-23 NOTE — ASSESSMENT & PLAN NOTE
Ongoing issue. Patient reports 100% compliance with lisinopril 10 mg daily. Chart review shows that her blood pressure and heart rate both are in the normal range. She denies any issues with dry cough, headache, dizziness, chest pain
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
## 2017-05-23 NOTE — MR AVS SNAPSHOT
"        Frances Meyer   2017 9:40 AM   Office Visit   MRN: [id number]    Department:  [facility]   Dept Phone:  [phone]    Description:  Female : 1965   Provider:  Sandrita Sam D.O.           Reason for Visit     Follow-Up           Allergies as of 2017     No Known Allergies      You were diagnosed with     Mixed hyperlipidemia   [272.2.ICD-9-CM]       Elevated fasting glucose   [484584]       Essential hypertension   [1744080]       Elevated LFTs   [281966]       Chronic bilateral low back pain without sciatica   [7112669]         Vital Signs     Blood Pressure Pulse Temperature Height Weight Body Mass Index    118/78 mmHg 77 36.3 °C (97.4 °F) 1.702 m (5' 7\") 84.823 kg (187 lb) 29.28 kg/m2    Oxygen Saturation Smoking Status                97% Former Smoker          Basic Information     Date Of Birth Sex Race Ethnicity Preferred Language    1965 Female White Non- English      Your appointments     2017  3:00 PM   Follow Up Med Management with CRISTINA Hurst   [facility] [address] [place] [address])    [address] [place] [address]  [address] [place] [id number]   [phone]            Sep 25, 2017  4:40 PM   Established Patient with Sandrita Sam D.O.   [address] [id number] [phone]           You will be receiving a confirmation call a few days before your appointment from our automated call confirmation system.              Problem List              ICD-10-CM Priority Class Noted - Resolved    Bipolar II disorder (CMS-HCC) F31.81   2016 - Present    Attention deficit hyperactivity disorder (ADHD) F90.9   2016 - Present    Elevated fasting glucose R73.01   2017 - Present    Mixed hyperlipidemia E78.2   2017 - Present    Essential hypertension I10   2017 - Present    Elevated LFTs R94.5   2017 - Present    Chronic bilateral low back pain without sciatica M54.5, G89.29  "  5/23/2017 - Present      Health Maintenance        Date Due Completion Dates    IMM DTaP/Tdap/Td Vaccine (1 - Tdap) 9/14/1982 ---    PAP SMEAR 9/14/1984 ---    COLONOSCOPY 9/14/2013 ---    MAMMOGRAM 11/8/2017 11/8/2016, 10/24/2016            Current Immunizations     No immunizations on file.      Below and/or attached are the medications your provider expects you to take. Review all of your home medications and newly ordered medications with your provider and/or pharmacist. Follow medication instructions as directed by your provider and/or pharmacist. Please keep your medication list with you and share with your provider. Update the information when medications are discontinued, doses are changed, or new medications (including over-the-counter products) are added; and carry medication information at all times in the event of emergency situations     Allergies:  No Known Allergies          Medications  Valid as of: May 23, 2017 - 10:02 AM    Generic Name Brand Name Tablet Size Instructions for use    Amphetamine-Dextroamphetamine (Tab) ADDERALL 10 MG Take 1 Tab by mouth 2 times a day.        Amphetamine-Dextroamphetamine (Tab) ADDERALL 10 MG Take 1 Tab by mouth every morning.        BuPROPion HCl (TABLET SR 24 HR) WELLBUTRIN  MG TAKE 1 TABLET BY MOUTH EVERY MORNING        LamoTRIgine (Tab) LAMICTAL 150 MG Take 1 Tab by mouth every day.        Levothyroxine Sodium (Tab) SYNTHROID 100 MCG Take 100 mcg by mouth every day.        Lisinopril (Tab) PRINIVIL 10 MG Take 1 Tab by mouth every day.        SUMAtriptan Succinate (Tab) IMITREX 100 MG Take 100 mg by mouth Once PRN.        TraZODone HCl (Tab) DESYREL 100 MG Take 0.5-1 Tabs by mouth at bedtime as needed for Sleep.        .                 Medicines prescribed today were sent to:     [id number] - [place] - [address] AT [address] [place] [address]    Phone: [phone] Fax: [phone]    Open 24 Hours?:  No    [id number] - [place], [place] - [address]    [address] [place] [address]    Phone: [phone] Fax: [phone]    Open 24 Hours?: No      Medication refill instructions:       If your prescription bottle indicates you have medication refills left, it is not necessary to call your provider’s office. Please contact your pharmacy and they will refill your medication.    If your prescription bottle indicates you do not have any refills left, you may request refills at any time through one of the following ways: The online [employer] system (except Urgent Care), by calling your provider’s office, or by asking your pharmacy to contact your provider’s office with a refill request. Medication refills are processed only during regular business hours and may not be available until the next business day. Your provider may request additional information or to have a follow-up visit with you prior to refilling your medication.   *Please Note: Medication refills are assigned a new Rx number when refilled electronically. Your pharmacy may indicate that no refills were authorized even though a new prescription for the same medication is available at the pharmacy. Please request the medicine by name with the pharmacy before contacting your provider for a refill.        Your To Do List     Future Labs/Procedures Complete By Expires    COMP METABOLIC PANEL  8/1/2017 5/24/2018    HEMOGLOBIN A1C  8/1/2017 5/24/2018    LIPID PROFILE  8/1/2017 5/24/2018         [employer] Access Code: [email or web address]  Expires: 5/25/2017  1:01 PM    [employer]  A secure, online tool to manage your health information     [employer]® is a secure, online tool that connects you to your personalized health information from the privacy of your home -- day or night - making it very easy for you to manage your healthcare. Once the activation process is completed, you can even access your medical information using the [employer] sheridan,  which is available for free in the Apple Manuel store or Google Play store.     [employer] provides the following levels of access (as shown below):   My Chart Features   [facility] Doctor [facility]  Specialists [facility]  Urgent  Care Non-[facility]  Primary Care  Doctor   Email your healthcare team securely and privately 24/7 X X X    Manage appointments: schedule your next appointment; view details of past/upcoming appointments X      Request prescription refills. X      View recent personal medical records, including lab and immunizations X X X X   View health record, including health history, allergies, medications X X X X   Read reports about your outpatient visits, procedures, consult and ER notes X X X X   See your discharge summary, which is a recap of your hospital and/or ER visit that includes your diagnosis, lab results, and care plan. X X       How to register for [employer]:  1. Go to  [email or web address].  2. Click on the Sign Up Now box, which takes you to the New Member Sign Up page. You will need to provide the following information:  a. Enter your [employer] Access Code exactly as it appears at the top of this page. (You will not need to use this code after you’ve completed the sign-up process. If you do not sign up before the expiration date, you must request a new code.)   b. Enter your date of birth.   c. Enter your home email address.   d. Click Submit, and follow the next screen’s instructions.  3. Create a [employer] ID. This will be your [employer] login ID and cannot be changed, so think of one that is secure and easy to remember.  4. Create a [employer] password. You can change your password at any time.  5. Enter your Password Reset Question and Answer. This can be used at a later time if you forget your password.   6. Enter your e-mail address. This allows you to receive e-mail notifications when new information is available in [employer].  7. Click Sign Up. You can now view your health  information.    For assistance activating your [employer] account, call [phone]

## 2017-05-23 NOTE — ASSESSMENT & PLAN NOTE
Ongoing issue. Recent blood work shows a patient's lipid panel was within a normal range except for the LDL cholesterol which was at 131. She reports that she has made some dramatic dietary changes but has not started exercising yet.

## 2017-05-23 NOTE — ASSESSMENT & PLAN NOTE
New problem. Chart review shows that recent blood work reveals a mildly elevated ALTs. Patient denies any issues with right upper quadrant pain, skin changes, I color changes.
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
## 2017-07-18 RX ORDER — TRAZODONE HYDROCHLORIDE 100 MG/1
50-100 TABLET ORAL NIGHTLY PRN
Qty: 90 TAB | Refills: 0 | Status: SHIPPED | OUTPATIENT
Start: 2017-07-18 | End: 2017-10-11

## 2017-07-18 NOTE — TELEPHONE ENCOUNTER
Was the patient seen in the last year in this department? Yes     Does patient have an active prescription for medications requested? Yes     Received Request Via: Pharmacy       FYI: Pt has an appointment 7/27/17

## 2017-07-26 ENCOUNTER — APPOINTMENT (OUTPATIENT)
Dept: BEHAVIORAL HEALTH | Facility: PHYSICIAN GROUP | Age: 52
End: 2017-07-26
Payer: MEDICARE

## 2017-08-16 ENCOUNTER — OFFICE VISIT (OUTPATIENT)
Dept: BEHAVIORAL HEALTH | Facility: PHYSICIAN GROUP | Age: 52
End: 2017-08-16
Payer: MEDICARE

## 2017-08-16 VITALS — SYSTOLIC BLOOD PRESSURE: 137 MMHG | DIASTOLIC BLOOD PRESSURE: 86 MMHG | HEART RATE: 80 BPM | RESPIRATION RATE: 14 BRPM

## 2017-08-16 DIAGNOSIS — F90.9 ATTENTION DEFICIT HYPERACTIVITY DISORDER (ADHD), UNSPECIFIED ADHD TYPE: ICD-10-CM

## 2017-08-16 DIAGNOSIS — F31.81 BIPOLAR II DISORDER (HCC): ICD-10-CM

## 2017-08-16 PROCEDURE — 99213 OFFICE O/P EST LOW 20 MIN: CPT | Performed by: NURSE PRACTITIONER

## 2017-08-16 RX ORDER — DEXTROAMPHETAMINE SACCHARATE, AMPHETAMINE ASPARTATE, DEXTROAMPHETAMINE SULFATE AND AMPHETAMINE SULFATE 2.5; 2.5; 2.5; 2.5 MG/1; MG/1; MG/1; MG/1
10 TABLET ORAL 2 TIMES DAILY
Qty: 60 TAB | Refills: 0 | Status: SHIPPED | OUTPATIENT
Start: 2017-10-15 | End: 2017-10-11 | Stop reason: SDUPTHER

## 2017-08-16 RX ORDER — DEXTROAMPHETAMINE SACCHARATE, AMPHETAMINE ASPARTATE, DEXTROAMPHETAMINE SULFATE AND AMPHETAMINE SULFATE 2.5; 2.5; 2.5; 2.5 MG/1; MG/1; MG/1; MG/1
10 TABLET ORAL EVERY MORNING
Qty: 60 TAB | Refills: 0 | Status: SHIPPED | OUTPATIENT
Start: 2017-10-15 | End: 2017-08-16 | Stop reason: CLARIF

## 2017-08-16 RX ORDER — DEXTROAMPHETAMINE SACCHARATE, AMPHETAMINE ASPARTATE, DEXTROAMPHETAMINE SULFATE AND AMPHETAMINE SULFATE 2.5; 2.5; 2.5; 2.5 MG/1; MG/1; MG/1; MG/1
10 TABLET ORAL 2 TIMES DAILY
Qty: 60 TAB | Refills: 0 | Status: SHIPPED | OUTPATIENT
Start: 2017-09-15 | End: 2017-08-16 | Stop reason: SDUPTHER

## 2017-08-16 RX ORDER — DEXTROAMPHETAMINE SACCHARATE, AMPHETAMINE ASPARTATE, DEXTROAMPHETAMINE SULFATE AND AMPHETAMINE SULFATE 2.5; 2.5; 2.5; 2.5 MG/1; MG/1; MG/1; MG/1
10 TABLET ORAL 2 TIMES DAILY
Qty: 60 TAB | Refills: 0 | Status: SHIPPED | OUTPATIENT
Start: 2017-08-16 | End: 2017-10-11

## 2017-08-16 RX ORDER — LAMOTRIGINE 200 MG/1
200 TABLET ORAL DAILY
Qty: 90 TAB | Refills: 0 | Status: SHIPPED | OUTPATIENT
Start: 2017-08-16 | End: 2017-10-11

## 2017-08-16 RX ORDER — DEXTROAMPHETAMINE SACCHARATE, AMPHETAMINE ASPARTATE, DEXTROAMPHETAMINE SULFATE AND AMPHETAMINE SULFATE 2.5; 2.5; 2.5; 2.5 MG/1; MG/1; MG/1; MG/1
10 TABLET ORAL 2 TIMES DAILY
Qty: 60 TAB | Refills: 0 | Status: SHIPPED | OUTPATIENT
Start: 2017-09-15 | End: 2017-10-11

## 2017-08-16 NOTE — PROGRESS NOTES
[facility]  PSYCHIATRIC FOLLOW-UP NOTE    Name: Frances Meyer  MRN: [id number]  : 1965  Age: 51 y.o.  Date of assessment: 2017  PCP: Sandrita Sam D.O.  Persons in attendance: Patient  Total face-to-face time: 15 minutes    REASON FOR VISIT/CHIEF COMPLAINT (as stated by Patient):  Frances Meyer is a 51 y.o., White female, attending follow-up appointment for bipolar disorder, ADHD, anxiety.      HISTORY OF PRESENT ILLNESS:    Patient reports she is having some brief periods of feeling down, does not last and is not severe. No suicidal thoughts. When she has her down days she isolates more, wants to stay in bed; reports  notices some of the same problems.  Reports some problems sleeping at night, often wakes and has a hard time getting back to sleep. Overall feels her medications are doing pretty well, no concerns. Patient is doing well on Adderall 10 mg daily right now.     PSYCHOSOCIAL CHANGES SINCE PREVIOUS CONTACT:  Patient has gotten     MEDICATION SIDE EFFECTS: none    REVIEW OF SYSTEMS:  General appearance: casually dressed  female, good grooming/hygiene. Pleasant and cooperative.      Constitutional negative - no fever/chills   Eyes not tested   Ears/Nose/Mouth/Throat not tested   Cardiovascular not tested   Respiratory not tested   Gastrointestinal negative   Genitourinary negative   Muscular not tested   Integumentary negative - no rash   Neurological negative   Endocrine not tested   Hematologic/Lymphatic not tested       PSYCHIATRIC EXAMINATION/MENTAL STATUS  /86 mmHg  Pulse 80  Resp 14  Breastfeeding? No  Participation: Active verbal participation, Attentive, Engaged and Open to feedback  Grooming:Casual and Neat  Orientation: Fully Oriented  Eye contact: Good  Behavior:Calm   Mood: Euthymic  Affect: Full range and Congruent with content  Thought process: Logical and Goal-directed  Thought content:  Within normal limits  Speech: Rate  within normal limits and Volume within normal limits  Perception:  Within normal limits  Memory:  No gross evidence of memory deficits  Insight: Good  Judgment: Good  Family/couple interaction observations:   Other:    Current risk:    Suicide: Low   Homicide: Not applicable   Self-harm: Not applicable  Relapse: Not applicable  Other:   Crisis Safety Plan reviewed?No  If evidence of imminent risk is present, intervention/plan:    Medical Records/Labs/Diagnostic Tests Reviewed: none    Medical Records/Labs/Diagnostic Tests Ordered: none    DIAGNOSTIC IMPRESSION(S):  1. Bipolar II disorder (CMS-[facility])    2. Attention deficit hyperactivity disorder (ADHD), unspecified ADHD type           ASSESSMENT AND PLAN:  Increase lamotrigine for mood stabilization. Reviewed sleep hygiene, need to stop screen use 2 hours before bed, not stay in bed awake, etc.      Current outpatient prescriptions:   •  lamotrigine (LAMICTAL) 200 MG tablet, Take 1 Tab by mouth every day., Disp: 90 Tab, Rfl: 0  •  amphetamine-dextroamphetamine (ADDERALL, 10MG,) 10 MG Tab, Take 1 Tab by mouth 2 times a day., Disp: 60 Tab, Rfl: 0  •  [START ON 10/15/2017] amphetamine-dextroamphetamine (ADDERALL, 10MG,) 10 MG Tab, Take 1 Tab by mouth 2 times a day., Disp: 60 Tab, Rfl: 0  •  [START ON 9/15/2017] amphetamine-dextroamphetamine (ADDERALL, 10MG,) 10 MG Tab, Take 1 Tab by mouth 2 times a day., Disp: 60 Tab, Rfl: 0  •  trazodone (DESYREL) 100 MG Tab, Take 0.5-1 Tabs by mouth at bedtime as needed for Sleep., Disp: 90 Tab, Rfl: 0  •  buPROPion (WELLBUTRIN XL) 300 MG XL tablet, TAKE 1 TABLET BY MOUTH EVERY MORNING, Disp: 90 Tab, Rfl: 0  •  lisinopril (PRINIVIL) 10 MG Tab, Take 1 Tab by mouth every day., Disp: 90 Tab, Rfl: 1  •  levothyroxine (SYNTHROID) 100 MCG TABS, Take 100 mcg by mouth every day., Disp: , Rfl:   •  sumatriptan (IMITREX) 100 MG tablet, Take 100 mg by mouth Once PRN., Disp: , Rfl:   •  amphetamine-dextroamphetamine (ADDERALL, 10MG,) 10 MG Tab,  Take 1 Tab by mouth 2 times a day., Disp: 60 Tab, Rfl: 0          DANIEL Hurst.

## 2017-09-08 RX ORDER — TRAZODONE HYDROCHLORIDE 50 MG/1
TABLET ORAL
Qty: 90 TAB | Refills: 0 | Status: SHIPPED | OUTPATIENT
Start: 2017-09-08 | End: 2017-12-06

## 2017-09-18 RX ORDER — BUPROPION HYDROCHLORIDE 300 MG/1
TABLET ORAL
Qty: 90 TAB | Refills: 0 | Status: SHIPPED | OUTPATIENT
Start: 2017-09-18 | End: 2017-12-06 | Stop reason: SDUPTHER

## 2017-09-29 ENCOUNTER — HOSPITAL ENCOUNTER (OUTPATIENT)
Dept: LAB | Facility: MEDICAL CENTER | Age: 52
End: 2017-09-29
Attending: FAMILY MEDICINE
Payer: MEDICARE

## 2017-09-29 DIAGNOSIS — E78.2 MIXED HYPERLIPIDEMIA: ICD-10-CM

## 2017-09-29 DIAGNOSIS — R73.01 ELEVATED FASTING GLUCOSE: ICD-10-CM

## 2017-09-29 LAB
ALBUMIN SERPL BCP-MCNC: 4.5 G/DL (ref 3.2–4.9)
ALBUMIN/GLOB SERPL: 1.7 G/DL
ALP SERPL-CCNC: 80 U/L (ref 30–99)
ALT SERPL-CCNC: 92 U/L (ref 2–50)
ANION GAP SERPL CALC-SCNC: 9 MMOL/L (ref 0–11.9)
AST SERPL-CCNC: 55 U/L (ref 12–45)
BILIRUB SERPL-MCNC: 0.8 MG/DL (ref 0.1–1.5)
BUN SERPL-MCNC: 9 MG/DL (ref 8–22)
CALCIUM SERPL-MCNC: 10.2 MG/DL (ref 8.5–10.5)
CHLORIDE SERPL-SCNC: 99 MMOL/L (ref 96–112)
CHOLEST SERPL-MCNC: 207 MG/DL (ref 100–199)
CO2 SERPL-SCNC: 28 MMOL/L (ref 20–33)
CREAT SERPL-MCNC: 0.89 MG/DL (ref 0.5–1.4)
GFR SERPL CREATININE-BSD FRML MDRD: >60 ML/MIN/1.73 M 2
GLOBULIN SER CALC-MCNC: 2.7 G/DL (ref 1.9–3.5)
GLUCOSE SERPL-MCNC: 135 MG/DL (ref 65–99)
HDLC SERPL-MCNC: 45 MG/DL
LDLC SERPL CALC-MCNC: 132 MG/DL
POTASSIUM SERPL-SCNC: 4.6 MMOL/L (ref 3.6–5.5)
PROT SERPL-MCNC: 7.2 G/DL (ref 6–8.2)
SODIUM SERPL-SCNC: 136 MMOL/L (ref 135–145)
TRIGL SERPL-MCNC: 151 MG/DL (ref 0–149)

## 2017-09-29 PROCEDURE — 80053 COMPREHEN METABOLIC PANEL: CPT

## 2017-09-29 PROCEDURE — 36415 COLL VENOUS BLD VENIPUNCTURE: CPT

## 2017-09-29 PROCEDURE — 80061 LIPID PANEL: CPT

## 2017-10-04 ENCOUNTER — OFFICE VISIT (OUTPATIENT)
Dept: MEDICAL GROUP | Facility: PHYSICIAN GROUP | Age: 52
End: 2017-10-04
Payer: MEDICARE

## 2017-10-04 VITALS
WEIGHT: 191 LBS | SYSTOLIC BLOOD PRESSURE: 118 MMHG | TEMPERATURE: 98.2 F | BODY MASS INDEX: 29.98 KG/M2 | HEIGHT: 67 IN | RESPIRATION RATE: 18 BRPM | DIASTOLIC BLOOD PRESSURE: 74 MMHG | OXYGEN SATURATION: 92 % | HEART RATE: 140 BPM

## 2017-10-04 DIAGNOSIS — R79.89 ELEVATED LFTS: ICD-10-CM

## 2017-10-04 DIAGNOSIS — R73.01 ELEVATED FASTING GLUCOSE: ICD-10-CM

## 2017-10-04 DIAGNOSIS — M67.40 GANGLION CYST: ICD-10-CM

## 2017-10-04 DIAGNOSIS — E78.2 MIXED HYPERLIPIDEMIA: ICD-10-CM

## 2017-10-04 PROCEDURE — 99214 OFFICE O/P EST MOD 30 MIN: CPT | Performed by: FAMILY MEDICINE

## 2017-10-04 NOTE — ASSESSMENT & PLAN NOTE
Ongoing issue. Patient did have repeat lab work which shows elevation in both of her liver function tests. Review current medications reveal that one of her medications could be contributing to this. Patient denies any issues with right upper quadrant pain, nausea, vomiting, skin color changes.

## 2017-10-04 NOTE — PROGRESS NOTES
Subjective:   Frances Meyer is a 52 y.o. female here today forElevated cholesterol, lesion on hand, lab review,    Mixed hyperlipidemia  Ongoing issue. Repeat lab work shows the patient has had worsening total cholesterol, LDL cholesterol, and triglycerides. When asked, patient reports that she has not been following any healthy eating plan nor has she been getting any regular daily exercise.    Ganglion cyst  New problem. Patient reports that over the last several months she has noticed a lesion on the palm of left hand. She states that over the last couple weeks it is actually started to get bigger and more painful. She states it is painful when she is trying to  something; denies any numbness or tingling; denies any problems with flexion or extension of her fingers. Patient doesn't take anything for the pain.    Elevated LFTs  Ongoing issue. Patient did have repeat lab work which shows elevation in both of her liver function tests. Review current medications reveal that one of her medications could be contributing to this. Patient denies any issues with right upper quadrant pain, nausea, vomiting, skin color changes.    Elevated fasting glucose  Ongoing issue. Repeat blood work shows that patient's fasting glucose is actually elevated over previous. As stated above patient reports that she has not been following any healthy eating plan nor has she any regular daily exercise. Plan was to repeat a hemoglobin A1c but patient reports that her insurance would not pay for the repeat test.         Current medicines (including changes today)  Current Outpatient Prescriptions   Medication Sig Dispense Refill   • buPROPion (WELLBUTRIN XL) 300 MG XL tablet TAKE 1 TABLET BY MOUTH EVERY MORNING 90 Tab 0   • lamotrigine (LAMICTAL) 200 MG tablet Take 1 Tab by mouth every day. 90 Tab 0   • amphetamine-dextroamphetamine (ADDERALL, 10MG,) 10 MG Tab Take 1 Tab by mouth 2 times a day. 60 Tab 0   • trazodone (DESYREL) 100 MG  "Tab Take 0.5-1 Tabs by mouth at bedtime as needed for Sleep. 90 Tab 0   • lisinopril (PRINIVIL) 10 MG Tab Take 1 Tab by mouth every day. 90 Tab 1   • levothyroxine (SYNTHROID) 100 MCG TABS Take 100 mcg by mouth every day.     • sumatriptan (IMITREX) 100 MG tablet Take 100 mg by mouth Once PRN.     • trazodone (DESYREL) 50 MG Tab TAKE 1 TABLET BY MOUTH AT BEDTIME AS NEEDED 90 Tab 0   • amphetamine-dextroamphetamine (ADDERALL, 10MG,) 10 MG Tab Take 1 Tab by mouth 2 times a day. 60 Tab 0   • [START ON 10/15/2017] amphetamine-dextroamphetamine (ADDERALL, 10MG,) 10 MG Tab Take 1 Tab by mouth 2 times a day. 60 Tab 0   • amphetamine-dextroamphetamine (ADDERALL, 10MG,) 10 MG Tab Take 1 Tab by mouth 2 times a day. 60 Tab 0     No current facility-administered medications for this visit.      She  has a past medical history of ADD (attention deficit disorder); Bipolar 1 disorder (CMS-HCC); Bipolar disorder (CMS-HCC); Chronic back pain; Depression; Diabetes (CMS-[facility]); Insomnia; Migraine; and Thyroid disease.    ROS   No chest pain, no shortness of breath, no abdominal pain  +Lesion in palm of left hand     Objective:     Blood pressure 118/74, pulse (!) 140, temperature 36.8 °C (98.2 °F), resp. rate 18, height 1.702 m (5' 7\"), weight 86.6 kg (191 lb), SpO2 92 %. Body mass index is 29.91 kg/m².   Physical Exam:  Alert, oriented in no acute distress.  Eye contact is good, speech goal directed, affect calm  HEENT: conjunctiva non-injected, sclera non-icteric.  Pinna normal. Oral mucous membranes pink and moist with no lesions.  Neck No adenopathy or masses in the neck or supraclavicular regions.  Lungs: clear to auscultation bilaterally with good excursion.  CV: regular rate and rhythm.  Abdomen: soft, nontender, No CVAT  Ext: no edema, color normal, vascularity normal, temperature normal  MSK: Palm of left hand-mass approximately 1 cm in diameter; semifirm; mobile; mild tenderness to palpation; painful with flexion of fingers; " no swelling or erythema      Assessment and Plan:   The following treatment plan was discussed     1. Mixed hyperlipidemia  LIPID PROFILE    Uncontrolled. Patient will attempt lifestyle changes; reevaluate in 4 months   2. Elevated fasting glucose  COMP METABOLIC PANEL    HEMOGLOBIN A1C    MICROALBUMIN CREAT RATIO URINE    Uncontrolled. Patient will attempt lifestyle changes; reevaluate in 4 months   3. Elevated LFTs      Monitor; reevaluate in 4 months   4. Ganglion cyst  REFERRAL TO HAND SURGERY    CANCELED: REFERRAL TO GENERAL SURGERY    Uncontrolled. Referral to hand surgery for further evaluation and treatment       Followup: Return in about 4 months (around 2/4/2018) for lab review.

## 2017-10-04 NOTE — ASSESSMENT & PLAN NOTE
Ongoing issue. Repeat lab work shows the patient has had worsening total cholesterol, LDL cholesterol, and triglycerides. When asked, patient reports that she has not been following any healthy eating plan nor has she been getting any regular daily exercise.

## 2017-10-04 NOTE — ASSESSMENT & PLAN NOTE
New problem. Patient reports that over the last several months she has noticed a lesion on the palm of left hand. She states that over the last couple weeks it is actually started to get bigger and more painful. She states it is painful when she is trying to  something; denies any numbness or tingling; denies any problems with flexion or extension of her fingers. Patient doesn't take anything for the pain.

## 2017-10-04 NOTE — ASSESSMENT & PLAN NOTE
Ongoing issue. Repeat blood work shows that patient's fasting glucose is actually elevated over previous. As stated above patient reports that she has not been following any healthy eating plan nor has she any regular daily exercise. Plan was to repeat a hemoglobin A1c but patient reports that her insurance would not pay for the repeat test.

## 2017-10-11 ENCOUNTER — OFFICE VISIT (OUTPATIENT)
Dept: BEHAVIORAL HEALTH | Facility: PHYSICIAN GROUP | Age: 52
End: 2017-10-11
Payer: MEDICARE

## 2017-10-11 DIAGNOSIS — F31.81 BIPOLAR II DISORDER (HCC): ICD-10-CM

## 2017-10-11 DIAGNOSIS — F90.9 ATTENTION DEFICIT HYPERACTIVITY DISORDER (ADHD), UNSPECIFIED ADHD TYPE: ICD-10-CM

## 2017-10-11 PROCEDURE — 99214 OFFICE O/P EST MOD 30 MIN: CPT | Performed by: NURSE PRACTITIONER

## 2017-10-11 RX ORDER — DEXTROAMPHETAMINE SACCHARATE, AMPHETAMINE ASPARTATE, DEXTROAMPHETAMINE SULFATE AND AMPHETAMINE SULFATE 2.5; 2.5; 2.5; 2.5 MG/1; MG/1; MG/1; MG/1
10 TABLET ORAL 2 TIMES DAILY
Qty: 60 TAB | Refills: 0 | Status: SHIPPED | OUTPATIENT
Start: 2017-11-14 | End: 2017-12-06 | Stop reason: SDUPTHER

## 2017-10-11 RX ORDER — LAMOTRIGINE 150 MG/1
150 TABLET ORAL DAILY
Qty: 90 TAB | Refills: 0 | Status: SHIPPED | OUTPATIENT
Start: 2017-10-11 | End: 2017-11-13 | Stop reason: SDUPTHER

## 2017-10-11 NOTE — PROGRESS NOTES
[facility]  PSYCHIATRIC FOLLOW-UP NOTE    Name: Frances Meyer  MRN: [id number]  : 1965  Age: 52 y.o.  Date of assessment: 10/11/2017  PCP: Sandrita Sam D.O.  Persons in attendance: Patient  Total face-to-face time: 25 minutes    REASON FOR VISIT/CHIEF COMPLAINT (as stated by Patient):  Frances Meyer is a 52 y.o., White female, attending follow-up appointment for bipolar disorder and ADHD.      HISTORY OF PRESENT ILLNESS:    Patient reports she has been doing okay with her mood, has been working on some home projects and keeping busy. Not feeling down or sad. Reports there is some concern about her liver function, asks about medications she takes and if any could be a potential problem. Reports she usually takes Adderall twice a day, cannot imagine getting things done without it because it helps her focus so much. No suicidal thoughts, sleeping okay at night.     PSYCHOSOCIAL CHANGES SINCE PREVIOUS CONTACT:  none      MEDICATION SIDE EFFECTS: none    REVIEW OF SYSTEMS:      General appearance: casually dressed  female, good grooming/hygiene. Pleasant and cooperative.   Constitutional negative - no fever/chills   Eyes not tested   Ears/Nose/Mouth/Throat not tested   Cardiovascular positive - sometimes feels heart palpitations   Respiratory not tested   Gastrointestinal negative   Genitourinary not tested   Muscular not tested   Integumentary negative - no rash   Neurological negative   Endocrine not tested   Hematologic/Lymphatic not tested       PSYCHIATRIC EXAMINATION/MENTAL STATUS  /84   Pulse 70   Resp 15   Breastfeeding? No   Participation: Active verbal participation, Attentive, Engaged and Open to feedback  Grooming:Casual and Neat  Orientation: Fully Oriented  Eye contact: Good  Behavior:Tense and upset about friends who live near fires in [place] today   Mood: Anxious  Affect: Full range and Congruent with content  Thought process: Logical and  Goal-directed  Thought content:  Within normal limits  Speech: Rate within normal limits and Volume within normal limits  Perception:  Within normal limits  Memory:  No gross evidence of memory deficits  Insight: Good  Judgment: Good  Family/couple interaction observations:   Other:    Current risk:    Suicide: Low   Homicide: Not applicable   Self-harm: Not applicable  Relapse: Low  Other:   Crisis Safety Plan reviewed?No  If evidence of imminent risk is present, intervention/plan:    Medical Records/Labs/Diagnostic Tests Reviewed: from 9/2017: elevated AST/ALT, elevated cholesterol and triglycerides  Sodium 136 135 - 145 mmol/L Final   Potassium 4.6 3.6 - 5.5 mmol/L Final   Chloride 99 96 - 112 mmol/L Final   Co2 28 20 - 33 mmol/L Final   Anion Gap 9.0 0.0 - 11.9 Final   Glucose 135  65 - 99 mg/dL Final   Bun 9 8 - 22 mg/dL Final   Creatinine 0.89 0.50 - 1.40 mg/dL Final   Calcium 10.2 8.5 - 10.5 mg/dL Final   AST(SGOT) 55  12 - 45 U/L Final   ALT(SGPT) 92  2 - 50 U/L Final   Alkaline Phosphatase 80 30 - 99 U/L Final   Total Bilirubin 0.8 0.1 - 1.5 mg/dL Final   Albumin 4.5 3.2 - 4.9 g/dL Final   Total Protein 7.2 6.0 - 8.2 g/dL Final   Globulin 2.7 1.9 - 3.5 g/dL Final   A-G Ratio 1.7 g/dL Final   Narrative     Request patient fasting?->Yes       ESTIMATED GFR (Order [id number]) - Reflex for Order [id number]   Component Results     Component Value Ref Range & Units Status   GFR If African American >60 >60 mL/min/1.73 m 2 Final   GFR If Non African American >60 >60 mL/min/1.73 m 2 Final     Cholesterol,Tot 207  100 - 199 mg/dL Final   Triglycerides 151  0 - 149 mg/dL Final   HDL 45 >=40 mg/dL Final     <100 mg/dL Final     GFR If African American >60 >60 mL/min/1.73 m 2 Final   GFR If Non African American >60 >60 mL/min/1.73 m 2 Final         Medical Records/Labs/Diagnostic Tests Ordered: none    DIAGNOSTIC IMPRESSION(S):  1. Bipolar II disorder (CMS-HCC)    2. Attention deficit hyperactivity disorder (ADHD),  unspecified ADHD type           ASSESSMENT AND PLAN: mood stable and ADHD helped with medications but patient is having some physical issues that necessitate med changes  1)reduce Adderall, patient is having palpitations, reviewed cardiac risk of stimulant and need to follow up with PCP; patient is in no distress today  -encourage her to try to limit to taking it once daily for now, may need to discontinue it  2) reduce lamotrigine to 150 mg daily, in light of elevated liver enzymes. Encourage patient to closely monitor her mood for worsening depression or instability. Educated patient lamotrigine may affect her liver, when the liver is not functioning optimally doses of meds may need to be reduced.   3)continue Wellbutrin XL and trazodone prn      Current Outpatient Prescriptions:   •  lamotrigine (LAMICTAL) 150 MG tablet, Take 1 Tab by mouth every day., Disp: 90 Tab, Rfl: 0  •  [START ON 11/14/2017] amphetamine-dextroamphetamine (ADDERALL, 10MG,) 10 MG Tab, Take 1 Tab by mouth 2 times a day., Disp: 60 Tab, Rfl: 0  •  buPROPion (WELLBUTRIN XL) 300 MG XL tablet, TAKE 1 TABLET BY MOUTH EVERY MORNING, Disp: 90 Tab, Rfl: 0  •  trazodone (DESYREL) 50 MG Tab, TAKE 1 TABLET BY MOUTH AT BEDTIME AS NEEDED, Disp: 90 Tab, Rfl: 0  •  lisinopril (PRINIVIL) 10 MG Tab, Take 1 Tab by mouth every day., Disp: 90 Tab, Rfl: 1  •  levothyroxine (SYNTHROID) 100 MCG TABS, Take 100 mcg by mouth every day., Disp: , Rfl:   •  sumatriptan (IMITREX) 100 MG tablet, Take 100 mg by mouth Once PRN., Disp: , Rfl:     Recheck 6-8 weeks or sooner if needed    Over 50% of this 25 minute visit was spent counseling patient today    CRISTINA Hurst

## 2017-10-12 VITALS — DIASTOLIC BLOOD PRESSURE: 84 MMHG | RESPIRATION RATE: 15 BRPM | HEART RATE: 70 BPM | SYSTOLIC BLOOD PRESSURE: 122 MMHG

## 2017-11-13 RX ORDER — LAMOTRIGINE 150 MG/1
150 TABLET ORAL DAILY
Qty: 90 TAB | Refills: 0 | Status: SHIPPED | OUTPATIENT
Start: 2017-11-13 | End: 2018-02-22 | Stop reason: SDUPTHER

## 2017-12-06 ENCOUNTER — OFFICE VISIT (OUTPATIENT)
Dept: BEHAVIORAL HEALTH | Facility: PHYSICIAN GROUP | Age: 52
End: 2017-12-06
Payer: MEDICARE

## 2017-12-06 VITALS
HEIGHT: 67 IN | HEART RATE: 90 BPM | BODY MASS INDEX: 29.35 KG/M2 | SYSTOLIC BLOOD PRESSURE: 150 MMHG | WEIGHT: 187 LBS | DIASTOLIC BLOOD PRESSURE: 90 MMHG

## 2017-12-06 DIAGNOSIS — F90.9 ATTENTION DEFICIT HYPERACTIVITY DISORDER (ADHD), UNSPECIFIED ADHD TYPE: ICD-10-CM

## 2017-12-06 DIAGNOSIS — F41.1 GAD (GENERALIZED ANXIETY DISORDER): ICD-10-CM

## 2017-12-06 DIAGNOSIS — F31.81 BIPOLAR II DISORDER (HCC): ICD-10-CM

## 2017-12-06 PROCEDURE — 99214 OFFICE O/P EST MOD 30 MIN: CPT | Performed by: NURSE PRACTITIONER

## 2017-12-06 RX ORDER — TRAZODONE HYDROCHLORIDE 100 MG/1
50-100 TABLET ORAL NIGHTLY PRN
Qty: 90 TAB | Refills: 0 | Status: SHIPPED | OUTPATIENT
Start: 2017-12-06 | End: 2018-02-10 | Stop reason: SDUPTHER

## 2017-12-06 RX ORDER — BUPROPION HYDROCHLORIDE 300 MG/1
TABLET ORAL
Qty: 90 TAB | Refills: 0 | Status: SHIPPED | OUTPATIENT
Start: 2017-12-06 | End: 2018-02-10 | Stop reason: SDUPTHER

## 2017-12-06 RX ORDER — DEXTROAMPHETAMINE SACCHARATE, AMPHETAMINE ASPARTATE, DEXTROAMPHETAMINE SULFATE AND AMPHETAMINE SULFATE 2.5; 2.5; 2.5; 2.5 MG/1; MG/1; MG/1; MG/1
10 TABLET ORAL 2 TIMES DAILY
Qty: 60 TAB | Refills: 0 | Status: SHIPPED | OUTPATIENT
Start: 2017-12-06 | End: 2018-01-05

## 2017-12-06 RX ORDER — DEXTROAMPHETAMINE SACCHARATE, AMPHETAMINE ASPARTATE, DEXTROAMPHETAMINE SULFATE AND AMPHETAMINE SULFATE 2.5; 2.5; 2.5; 2.5 MG/1; MG/1; MG/1; MG/1
10 TABLET ORAL 2 TIMES DAILY
Qty: 60 TAB | Refills: 0 | Status: SHIPPED | OUTPATIENT
Start: 2018-02-04 | End: 2018-03-06

## 2017-12-06 RX ORDER — DEXTROAMPHETAMINE SACCHARATE, AMPHETAMINE ASPARTATE, DEXTROAMPHETAMINE SULFATE AND AMPHETAMINE SULFATE 2.5; 2.5; 2.5; 2.5 MG/1; MG/1; MG/1; MG/1
10 TABLET ORAL 2 TIMES DAILY
Qty: 60 TAB | Refills: 0 | Status: SHIPPED | OUTPATIENT
Start: 2018-01-05 | End: 2018-02-04

## 2017-12-06 NOTE — PROGRESS NOTES
"[facility]  PSYCHIATRIC FOLLOW-UP NOTE    Name: Frances Meyer  MRN: [id number]  : 1965  Age: 52 y.o.  Date of assessment: 2017  PCP: Sandrita Sam D.O.  Persons in attendance: Patient  Total face-to-face time: 30 minutes    REASON FOR VISIT/CHIEF COMPLAINT (as stated by Patient):  Frances Meyer is a 52 y.o., White female, attending follow-up appointment for bipolar disorder, ADHD.      HISTORY OF PRESENT ILLNESS:    Patient reports overall her mood has been stable and good, not feeling down or depressed. Taking trazodone 100 mg hs to help with sleep, has reduced dose of lamotrigine to 150 mg about 2 weeks ago but has not noticed any problems with her mood. Does feel anxious about leaving the house, sometimes avoids going out or waits until last minute to get ready. Does not have panic attacks like she used to, just has a sense that she doesn't want to go out and can't pinpoint any particular fear or worry No longer feeling heart palpitations, has been taking Adderall 10 mg BID and is doing okay with the dose. No suicidal thoughts.     PSYCHOSOCIAL CHANGES SINCE PREVIOUS CONTACT:  Wine on occasion      MEDICATION SIDE EFFECTS: none    REVIEW OF SYSTEMS:   General appearance: casually dressed  female, good grooming/hygiene. Pleasant and cooperative.      Constitutional negative - no fever/chills   Eyes not tested   Ears/Nose/Mouth/Throat not tested   Cardiovascular Ran out of bp medication, did not take antihypertensive today   Respiratory not tested   Gastrointestinal negative   Genitourinary not tested   Muscular not tested   Integumentary negative - rash   Neurological negative   Endocrine not tested   Hematologic/Lymphatic not tested       PSYCHIATRIC EXAMINATION/MENTAL STATUS  /90   Pulse 90   Ht 1.702 m (5' 7\")   Wt 84.8 kg (187 lb)   BMI 29.29 kg/m²   Participation: Active verbal participation, Attentive, Engaged and Open to feedback  Grooming:Casual and " Neat  Orientation: Fully Oriented  Eye contact: Good  Behavior:Calm   Mood: Euthymic  Affect: Full range and Congruent with content  Thought process: Logical and Goal-directed  Thought content:  Within normal limits  Speech: Rate within normal limits and Volume within normal limits  Perception:  Within normal limits  Memory:  No gross evidence of memory deficits  Insight: Good  Judgment: Good  Family/couple interaction observations:   Other:    Current risk:    Suicide: Low   Homicide: Not applicable   Self-harm: Not applicable  Relapse: Not applicable  Other:   Crisis Safety Plan reviewed?No  If evidence of imminent risk is present, intervention/plan:    Medical Records/Labs/Diagnostic Tests Reviewed: none    Medical Records/Labs/Diagnostic Tests Ordered: none    DIAGNOSTIC IMPRESSION(S):  1. Bipolar II disorder (CMS-HCC)    2. Attention deficit hyperactivity disorder (ADHD), unspecified ADHD type    3. DORENE (generalized anxiety disorder)           ASSESSMENT AND PLAN:  1) continue with Adderall at current dose, ADHD controlled. Patient counseled she needs to get back on her lisinopril, bp is elevated today.  2) continue with lamotrigine, Wellbutrin XL, and trazodone, mood is stable and she is sleeping well      Current Outpatient Prescriptions:   •  trazodone (DESYREL) 100 MG Tab, Take 0.5-1 Tabs by mouth at bedtime as needed for Sleep., Disp: 90 Tab, Rfl: 0  •  buPROPion (WELLBUTRIN XL) 300 MG XL tablet, TAKE 1 TABLET BY MOUTH EVERY MORNING, Disp: 90 Tab, Rfl: 0  •  amphetamine-dextroamphetamine (ADDERALL, 10MG,) 10 MG Tab, Take 1 Tab by mouth 2 times a day for 30 days., Disp: 60 Tab, Rfl: 0  •  [START ON 1/5/2018] amphetamine-dextroamphetamine (ADDERALL, 10MG,) 10 MG Tab, Take 1 Tab by mouth 2 times a day for 30 days., Disp: 60 Tab, Rfl: 0  •  [START ON 2/4/2018] amphetamine-dextroamphetamine (ADDERALL, 10MG,) 10 MG Tab, Take 1 Tab by mouth 2 times a day for 30 days., Disp: 60 Tab, Rfl: 0  •  lisinopril  (PRINIVIL) 10 MG Tab, TAKE 1 TABLET BY MOUTH ONCE DAILY, Disp: 90 Tab, Rfl: 3  •  lamotrigine (LAMICTAL) 150 MG tablet, Take 1 Tab by mouth every day., Disp: 90 Tab, Rfl: 0  •  levothyroxine (SYNTHROID) 100 MCG TABS, Take 100 mcg by mouth every day., Disp: , Rfl:   •  sumatriptan (IMITREX) 100 MG tablet, Take 100 mg by mouth Once PRN., Disp: , Rfl:       18 minutes were spent in psychotherapy.  (If greater than 16 minutes, add [id number] code)   Topics addressed in psychotherapy include:supportive. Work with patient to identify accepting feeling of anxiety before going on, then work to do a quick self check and go back to her activity. Discussed working on mindfulness, identifies meditation used to help. Reviewed need to continue to get out and resist the urge to stay home, helps build positive evidence of going out in her mind.     Recheck 3 months or sooner if needed    DANIEL Hurst.

## 2017-12-14 RX ORDER — LEVOTHYROXINE SODIUM 0.1 MG/1
TABLET ORAL
Qty: 90 TAB | Refills: 3 | Status: SHIPPED | OUTPATIENT
Start: 2017-12-14 | End: 2018-06-21 | Stop reason: SDUPTHER

## 2018-02-07 ENCOUNTER — APPOINTMENT (OUTPATIENT)
Dept: MEDICAL GROUP | Facility: PHYSICIAN GROUP | Age: 53
End: 2018-02-07
Payer: MEDICARE

## 2018-02-12 RX ORDER — BUPROPION HYDROCHLORIDE 300 MG/1
TABLET ORAL
Qty: 90 TAB | Refills: 0 | Status: SHIPPED | OUTPATIENT
Start: 2018-02-12 | End: 2018-05-05 | Stop reason: SDUPTHER

## 2018-02-12 RX ORDER — TRAZODONE HYDROCHLORIDE 100 MG/1
TABLET ORAL
Qty: 90 TAB | Refills: 0 | Status: SHIPPED | OUTPATIENT
Start: 2018-02-12 | End: 2018-03-07 | Stop reason: SDUPTHER

## 2018-02-23 RX ORDER — LAMOTRIGINE 150 MG/1
TABLET ORAL
Qty: 90 TAB | Refills: 0 | Status: SHIPPED | OUTPATIENT
Start: 2018-02-23 | End: 2018-06-06 | Stop reason: SDUPTHER

## 2018-03-07 ENCOUNTER — OFFICE VISIT (OUTPATIENT)
Dept: BEHAVIORAL HEALTH | Facility: PHYSICIAN GROUP | Age: 53
End: 2018-03-07
Payer: MEDICARE

## 2018-03-07 VITALS
BODY MASS INDEX: 29.76 KG/M2 | DIASTOLIC BLOOD PRESSURE: 78 MMHG | SYSTOLIC BLOOD PRESSURE: 120 MMHG | HEART RATE: 70 BPM | WEIGHT: 190 LBS

## 2018-03-07 DIAGNOSIS — F90.9 ATTENTION DEFICIT HYPERACTIVITY DISORDER (ADHD), UNSPECIFIED ADHD TYPE: ICD-10-CM

## 2018-03-07 DIAGNOSIS — F31.81 BIPOLAR II DISORDER (HCC): ICD-10-CM

## 2018-03-07 DIAGNOSIS — F41.1 GAD (GENERALIZED ANXIETY DISORDER): ICD-10-CM

## 2018-03-07 PROCEDURE — 90833 PSYTX W PT W E/M 30 MIN: CPT | Performed by: NURSE PRACTITIONER

## 2018-03-07 PROCEDURE — 99213 OFFICE O/P EST LOW 20 MIN: CPT | Performed by: NURSE PRACTITIONER

## 2018-03-07 RX ORDER — DEXTROAMPHETAMINE SACCHARATE, AMPHETAMINE ASPARTATE, DEXTROAMPHETAMINE SULFATE AND AMPHETAMINE SULFATE 2.5; 2.5; 2.5; 2.5 MG/1; MG/1; MG/1; MG/1
10 TABLET ORAL 2 TIMES DAILY
Qty: 60 TAB | Refills: 0 | Status: SHIPPED | OUTPATIENT
Start: 2018-04-06 | End: 2018-05-06

## 2018-03-07 RX ORDER — TRAZODONE HYDROCHLORIDE 100 MG/1
TABLET ORAL
Qty: 90 TAB | Refills: 0 | Status: SHIPPED | OUTPATIENT
Start: 2018-03-07 | End: 2018-05-05 | Stop reason: SDUPTHER

## 2018-03-07 RX ORDER — DEXTROAMPHETAMINE SACCHARATE, AMPHETAMINE ASPARTATE, DEXTROAMPHETAMINE SULFATE AND AMPHETAMINE SULFATE 2.5; 2.5; 2.5; 2.5 MG/1; MG/1; MG/1; MG/1
10 TABLET ORAL 2 TIMES DAILY
Qty: 60 TAB | Refills: 0 | Status: SHIPPED | OUTPATIENT
Start: 2018-03-07 | End: 2018-06-06 | Stop reason: SDUPTHER

## 2018-03-08 NOTE — PROGRESS NOTES
"   [facility]  PSYCHIATRIC FOLLOW-UP NOTE    Name: Frances Meyer  MRN: [id number]  : 1965  Age: 52 y.o.  Date of assessment: 3/7/2018  PCP: Sandrita Sam D.O.  Persons in attendance: Patient  Total face-to-face time: 40 minutes    REASON FOR VISIT/CHIEF COMPLAINT (as stated by Patient):  Frances Meyer is a 52 y.o., White female, attending follow-up appointment for bipolar disorder, ADHD, anxiety.      HISTORY OF PRESENT ILLNESS:    Frances reports she has been having a difficult time, is upset about the relationship with her daughter. Ruminating thoughts often. Feeling irritable more often, reports she also felt a little more \"manic\" for a few days about a week ago and was staying up until morning. Sad and down about relationship with her daughter. No suicidal thoughts. Not sleeping as well lately either. Still taking her stimulant, Adderall, to help with focus and it is helpful to her    PSYCHOSOCIAL CHANGES SINCE PREVIOUS CONTACT:  No drinking or drug use.  to  Jeff, good support.     MEDICATION SIDE EFFECTS: none    REVIEW OF SYSTEMS:    General appearance: casually dressed  female, good grooming/hygiene. Pleasant and cooperative.     Constitutional negative - no fever/chills   Eyes not tested   Ears/Nose/Mouth/Throat not tested   Cardiovascular not tested   Respiratory negative - no shortness of breath or acute distress   Gastrointestinal negative   Genitourinary not tested   Muscular not tested   Integumentary negative - no rash   Neurological negative   Endocrine not tested   Hematologic/Lymphatic not tested       PSYCHIATRIC EXAMINATION/MENTAL STATUS  /78   Pulse 70   Wt 86.2 kg (190 lb)   Breastfeeding? No   BMI 29.76 kg/m²   Participation: Active verbal participation, Attentive, Engaged and Open to feedback  Grooming:Casual and Neat  Orientation: Fully Oriented  Eye contact: Good  Behavior:Calm   Mood: Depressed  Affect: Full range and Congruent " with content  Thought process: Logical and Goal-directed  Thought content:  Within normal limits  Speech: Rate within normal limits and Volume within normal limits  Perception:  Within normal limits  Memory:  No gross evidence of memory deficits  Insight: Good  Judgment: Good  Family/couple interaction observations:   Other:    Current risk:    Suicide: Low   Homicide: Not applicable   Self-harm: Not applicable  Relapse: Not applicable  Other:   Crisis Safety Plan reviewed?No  If evidence of imminent risk is present, intervention/plan:    Medical Records/Labs/Diagnostic Tests Reviewed: none    Medical Records/Labs/Diagnostic Tests Ordered: none    DIAGNOSTIC IMPRESSION(S):  1. Bipolar II disorder (CMS-HCC)    2. DORENE (generalized anxiety disorder)    3. Attention deficit hyperactivity disorder (ADHD), unspecified ADHD type           ASSESSMENT AND PLAN:  Reviewed  Aware, has only filled stimulant medication from this provider, filling appropriately.  -controlled substance agreement/consent reviewed/signed today. Patient aware of cardiac risk and risks of taking Adderall.  -discussed she has upcoming labs with PCP, will re-evaluate her liver function and consider dose increase of lamotrigine for depression and irritability if her liver function is looking okay.  -increase trazodone for sleep and continue Wellbutrin XL      Current Outpatient Prescriptions:   •  traZODone (DESYREL) 100 MG Tab, TAKE 1/2 TO 1 TABLET BY  MOUTH AT BEDTIME AS NEEDED  FOR SLEEP, Disp: 90 Tab, Rfl: 0  •  amphetamine-dextroamphetamine (ADDERALL, 10MG,) 10 MG Tab, Take 1 Tab by mouth 2 times a day for 30 days., Disp: 60 Tab, Rfl: 0  •  [START ON 4/6/2018] amphetamine-dextroamphetamine (ADDERALL, 10MG,) 10 MG Tab, Take 1 Tab by mouth 2 times a day for 30 days., Disp: 60 Tab, Rfl: 0  •  lamotrigine (LAMICTAL) 150 MG tablet, TAKE 1 TABLET BY MOUTH  EVERY DAY, Disp: 90 Tab, Rfl: 0  •  buPROPion (WELLBUTRIN XL) 300 MG XL tablet, TAKE 1 TABLET BY  MOUTH  EVERY MORNING, Disp: 90 Tab, Rfl: 0  •  levothyroxine (SYNTHROID) 100 MCG Tab, TAKE 1 TABLET BY MOUTH EVERY DAY, Disp: 90 Tab, Rfl: 3  •  lisinopril (PRINIVIL) 10 MG Tab, TAKE 1 TABLET BY MOUTH ONCE DAILY, Disp: 90 Tab, Rfl: 3  •  sumatriptan (IMITREX) 100 MG tablet, Take 100 mg by mouth Once PRN., Disp: , Rfl:       20 minutes were spent in psychotherapy.  (If greater than 16 minutes, add [id number] code)   Topics addressed in psychotherapy include: supportive. Discussed boundaries with daughter, not putting herself in a position with her that assures her rejection. Processed future relationship, what changes need to occur and her willingness to continue being hurt.     DANIEL Hurst.

## 2018-03-26 ENCOUNTER — HOSPITAL ENCOUNTER (OUTPATIENT)
Dept: LAB | Facility: MEDICAL CENTER | Age: 53
End: 2018-03-26
Attending: FAMILY MEDICINE
Payer: MEDICARE

## 2018-03-26 DIAGNOSIS — R73.01 ELEVATED FASTING GLUCOSE: ICD-10-CM

## 2018-03-26 DIAGNOSIS — E78.2 MIXED HYPERLIPIDEMIA: ICD-10-CM

## 2018-03-26 LAB
ALBUMIN SERPL BCP-MCNC: 4.5 G/DL (ref 3.2–4.9)
ALBUMIN/GLOB SERPL: 2 G/DL
ALP SERPL-CCNC: 85 U/L (ref 30–99)
ALT SERPL-CCNC: 113 U/L (ref 2–50)
ANION GAP SERPL CALC-SCNC: 9 MMOL/L (ref 0–11.9)
AST SERPL-CCNC: 55 U/L (ref 12–45)
BILIRUB SERPL-MCNC: 0.6 MG/DL (ref 0.1–1.5)
BUN SERPL-MCNC: 11 MG/DL (ref 8–22)
CALCIUM SERPL-MCNC: 9.5 MG/DL (ref 8.5–10.5)
CHLORIDE SERPL-SCNC: 103 MMOL/L (ref 96–112)
CHOLEST SERPL-MCNC: 233 MG/DL (ref 100–199)
CO2 SERPL-SCNC: 29 MMOL/L (ref 20–33)
CREAT SERPL-MCNC: 0.92 MG/DL (ref 0.5–1.4)
CREAT UR-MCNC: 202.1 MG/DL
EST. AVERAGE GLUCOSE BLD GHB EST-MCNC: 157 MG/DL
GLOBULIN SER CALC-MCNC: 2.2 G/DL (ref 1.9–3.5)
GLUCOSE SERPL-MCNC: 153 MG/DL (ref 65–99)
HBA1C MFR BLD: 7.1 % (ref 0–5.6)
HDLC SERPL-MCNC: 46 MG/DL
LDLC SERPL CALC-MCNC: 150 MG/DL
MICROALBUMIN UR-MCNC: 1.1 MG/DL
MICROALBUMIN/CREAT UR: 5 MG/G (ref 0–30)
POTASSIUM SERPL-SCNC: 4.3 MMOL/L (ref 3.6–5.5)
PROT SERPL-MCNC: 6.7 G/DL (ref 6–8.2)
SODIUM SERPL-SCNC: 141 MMOL/L (ref 135–145)
TRIGL SERPL-MCNC: 186 MG/DL (ref 0–149)

## 2018-03-26 PROCEDURE — 83036 HEMOGLOBIN GLYCOSYLATED A1C: CPT | Mod: GA

## 2018-03-26 PROCEDURE — 36415 COLL VENOUS BLD VENIPUNCTURE: CPT

## 2018-03-26 PROCEDURE — 80053 COMPREHEN METABOLIC PANEL: CPT

## 2018-03-26 PROCEDURE — 80061 LIPID PANEL: CPT

## 2018-03-26 PROCEDURE — 82043 UR ALBUMIN QUANTITATIVE: CPT

## 2018-03-26 PROCEDURE — 82570 ASSAY OF URINE CREATININE: CPT

## 2018-03-29 ENCOUNTER — OFFICE VISIT (OUTPATIENT)
Dept: MEDICAL GROUP | Facility: PHYSICIAN GROUP | Age: 53
End: 2018-03-29
Payer: MEDICARE

## 2018-03-29 ENCOUNTER — HOSPITAL ENCOUNTER (OUTPATIENT)
Dept: LAB | Facility: MEDICAL CENTER | Age: 53
End: 2018-03-29
Attending: FAMILY MEDICINE
Payer: MEDICARE

## 2018-03-29 VITALS
HEART RATE: 92 BPM | SYSTOLIC BLOOD PRESSURE: 130 MMHG | HEIGHT: 68 IN | WEIGHT: 195 LBS | TEMPERATURE: 98.1 F | BODY MASS INDEX: 29.55 KG/M2 | OXYGEN SATURATION: 95 % | DIASTOLIC BLOOD PRESSURE: 84 MMHG

## 2018-03-29 DIAGNOSIS — K21.9 GASTROESOPHAGEAL REFLUX DISEASE WITHOUT ESOPHAGITIS: ICD-10-CM

## 2018-03-29 DIAGNOSIS — R79.89 ELEVATED LFTS: ICD-10-CM

## 2018-03-29 DIAGNOSIS — E78.2 MIXED HYPERLIPIDEMIA: ICD-10-CM

## 2018-03-29 DIAGNOSIS — E03.9 ACQUIRED HYPOTHYROIDISM: ICD-10-CM

## 2018-03-29 DIAGNOSIS — R73.01 ELEVATED FASTING GLUCOSE: ICD-10-CM

## 2018-03-29 DIAGNOSIS — Z12.31 SCREENING MAMMOGRAM, ENCOUNTER FOR: ICD-10-CM

## 2018-03-29 LAB
HAV IGM SERPL QL IA: NEGATIVE
HBV CORE IGM SER QL: NEGATIVE
HBV SURFACE AG SER QL: NEGATIVE
HCV AB SER QL: NEGATIVE

## 2018-03-29 PROCEDURE — 80074 ACUTE HEPATITIS PANEL: CPT | Mod: GA

## 2018-03-29 PROCEDURE — 36415 COLL VENOUS BLD VENIPUNCTURE: CPT | Mod: GA

## 2018-03-29 PROCEDURE — 99215 OFFICE O/P EST HI 40 MIN: CPT | Performed by: FAMILY MEDICINE

## 2018-03-29 RX ORDER — OMEPRAZOLE 40 MG/1
40 CAPSULE, DELAYED RELEASE ORAL DAILY
Qty: 90 CAP | Refills: 1 | Status: SHIPPED | OUTPATIENT
Start: 2018-03-29 | End: 2018-07-16 | Stop reason: SDUPTHER

## 2018-03-29 NOTE — PROGRESS NOTES
Subjective:   Frances Meyer is a 52 y.o. female here today for elevated fasting glucose, elevated LFTs, elevated cholesterol, thyroid, reflux    Elevated fasting glucose  Ongoing issues; recent labs again reviewed in detail with the patient today which shows her fasting glucose is now increased to 153 with a hemoglobin A1c of 7.1. States that she is not made any specific diet or exercise changes since her last appointment. She does report she is still concerning given that she does have a strong family history of diabetes. She reports that she is now started on a new medication at this time.    Mixed hyperlipidemia  Ongoing issue; recent labs been reviewed in detail with the patient today which shows that again her total cholesterol and LDL cholesterol continue to elevate. Again, we have had a conversation that she dates to make significant lifestyle changes that she does not want to be started on medication.    Elevated LFTs  Ongoing issue; recent labs been reviewed in detail with the patient today which shows that her AST and ALT continue to be elevated. She states that they've been elevated off and on in the past and is always been associated with her current psychiatric medications.    Acquired hypothyroidism  Ongoing issues; she reports compliance with current medication and currently denies any new issues or temperature intolerance, skin changes, hair changes    Gastroesophageal reflux disease without esophagitis  New problem. Patient is reporting severe reflux to the point that she is taking over-the-counter medication on a daily basis and this has been somewhat beneficial. She states that she has burning in the back of her throat along with some occasional nausea.         Current medicines (including changes today)  Current Outpatient Prescriptions   Medication Sig Dispense Refill   • omeprazole (PRILOSEC) 40 MG delayed-release capsule Take 1 Cap by mouth every day. 90 Cap 1   • traZODone (DESYREL) 100 MG  "Tab TAKE 1/2 TO 1 TABLET BY  MOUTH AT BEDTIME AS NEEDED  FOR SLEEP 90 Tab 0   • amphetamine-dextroamphetamine (ADDERALL, 10MG,) 10 MG Tab Take 1 Tab by mouth 2 times a day for 30 days. 60 Tab 0   • [START ON 4/6/2018] amphetamine-dextroamphetamine (ADDERALL, 10MG,) 10 MG Tab Take 1 Tab by mouth 2 times a day for 30 days. 60 Tab 0   • lamotrigine (LAMICTAL) 150 MG tablet TAKE 1 TABLET BY MOUTH  EVERY DAY 90 Tab 0   • buPROPion (WELLBUTRIN XL) 300 MG XL tablet TAKE 1 TABLET BY MOUTH  EVERY MORNING 90 Tab 0   • levothyroxine (SYNTHROID) 100 MCG Tab TAKE 1 TABLET BY MOUTH EVERY DAY 90 Tab 3   • lisinopril (PRINIVIL) 10 MG Tab TAKE 1 TABLET BY MOUTH ONCE DAILY 90 Tab 3   • sumatriptan (IMITREX) 100 MG tablet Take 100 mg by mouth Once PRN.       No current facility-administered medications for this visit.      She  has a past medical history of ADD (attention deficit disorder); Bipolar 1 disorder (CMS-[facility]); Bipolar disorder (CMS-[facility]); Chronic back pain; Depression; Diabetes (CMS-[facility]); Insomnia; Migraine; and Thyroid disease.    ROS   No chest pain, no shortness of breath, no abdominal pain  + Reflux     Objective:     Blood pressure 130/84, pulse 92, temperature 36.7 °C (98.1 °F), height 1.727 m (5' 8\"), weight 88.5 kg (195 lb), SpO2 95 %. Body mass index is 29.65 kg/m².   Physical Exam:  Alert, oriented in no acute distress.  Eye contact is good, speech goal directed, affect calm  HEENT: conjunctiva non-injected, sclera non-icteric.  Pinna normal. TM pearly gray.   Oral mucous membranes pink and moist with no lesions.  Neck No adenopathy or masses in the neck or supraclavicular regions.  Lungs: clear to auscultation bilaterally with good excursion.  CV: regular rate and rhythm.  Abdomen: soft, nontender, No CVAT  Ext: no edema, color normal, vascularity normal, temperature normal        Assessment and Plan:   The following treatment plan was discussed     1. Gastroesophageal reflux disease without esophagitis      " Uncontrolled; prescription for omeprazole 40 mg provided daily; reevaluated next appointment   2. Elevated LFTs  US-LIVER AND BILIARY TREE    HEPATITIS PANEL ACUTE(4 COMPONENTS)    COMP METABOLIC PANEL    Uncontrolled; unknown origin; liver ultrasound along with hepatitis panel ordered; monitor for results   3. Elevated fasting glucose      Uncontrolled; patient wants to tell lifestyle changes; recheck labs in 4 months   4. Mixed hyperlipidemia  LIPID PROFILE    Uncontrolled; patient wants to tell lifestyle changes; recheck labs in 4 months   5. Acquired hypothyroidism  TSH    FREE THYROXINE    Stable. Continue current medications; recheck labs in 4 months   6. Screening mammogram, encounter for  MA-SCREEN MAMMO W/CAD-BILAT    Screening mammogram patient denies any breast issues today.     Greater than 40 minutes was spent in chart review, assessment, plan and care, consultation with the patient; greater than 50% time spent in face-to-face consultation with the patient on the above listed assessment and plan that includes diagnoses    Followup: Return in about 3 months (around 6/29/2018) for foot pain/pap/GERD, Long.

## 2018-03-29 NOTE — ASSESSMENT & PLAN NOTE
New problem. Patient is reporting severe reflux to the point that she is taking over-the-counter medication on a daily basis and this has been somewhat beneficial. She states that she has burning in the back of her throat along with some occasional nausea.

## 2018-03-29 NOTE — ASSESSMENT & PLAN NOTE
Ongoing issues; recent labs again reviewed in detail with the patient today which shows her fasting glucose is now increased to 153 with a hemoglobin A1c of 7.1. States that she is not made any specific diet or exercise changes since her last appointment. She does report she is still concerning given that she does have a strong family history of diabetes. She reports that she is now started on a new medication at this time.

## 2018-03-29 NOTE — ASSESSMENT & PLAN NOTE
Ongoing issues; she reports compliance with current medication and currently denies any new issues or temperature intolerance, skin changes, hair changes

## 2018-03-29 NOTE — ASSESSMENT & PLAN NOTE
Ongoing issue; recent labs been reviewed in detail with the patient today which shows that again her total cholesterol and LDL cholesterol continue to elevate. Again, we have had a conversation that she dates to make significant lifestyle changes that she does not want to be started on medication.

## 2018-03-29 NOTE — ASSESSMENT & PLAN NOTE
Ongoing issue; recent labs been reviewed in detail with the patient today which shows that her AST and ALT continue to be elevated. She states that they've been elevated off and on in the past and is always been associated with her current psychiatric medications.

## 2018-03-30 ENCOUNTER — TELEPHONE (OUTPATIENT)
Dept: MEDICAL GROUP | Facility: PHYSICIAN GROUP | Age: 53
End: 2018-03-30

## 2018-03-30 NOTE — TELEPHONE ENCOUNTER
----- Message from Sandrita Sam D.O. sent at 3/30/2018  7:35 AM PDT -----  Please advise pt that the additional blood work did not show any infection in the liver. I will await the results of the ultrasound.    Sandrita Sam D.O.

## 2018-04-16 ENCOUNTER — HOSPITAL ENCOUNTER (OUTPATIENT)
Dept: RADIOLOGY | Facility: MEDICAL CENTER | Age: 53
End: 2018-04-16
Attending: FAMILY MEDICINE
Payer: MEDICARE

## 2018-04-16 DIAGNOSIS — R79.89 ELEVATED LFTS: ICD-10-CM

## 2018-04-16 DIAGNOSIS — Z12.31 SCREENING MAMMOGRAM, ENCOUNTER FOR: ICD-10-CM

## 2018-04-16 PROCEDURE — 76705 ECHO EXAM OF ABDOMEN: CPT

## 2018-04-16 PROCEDURE — 77067 SCR MAMMO BI INCL CAD: CPT

## 2018-05-07 RX ORDER — TRAZODONE HYDROCHLORIDE 100 MG/1
TABLET ORAL
Qty: 90 TAB | Refills: 0 | Status: SHIPPED | OUTPATIENT
Start: 2018-05-07 | End: 2018-08-15 | Stop reason: SDUPTHER

## 2018-05-07 RX ORDER — BUPROPION HYDROCHLORIDE 300 MG/1
TABLET ORAL
Qty: 90 TAB | Refills: 0 | Status: SHIPPED | OUTPATIENT
Start: 2018-05-07 | End: 2018-08-15 | Stop reason: SDUPTHER

## 2018-06-06 ENCOUNTER — OFFICE VISIT (OUTPATIENT)
Dept: BEHAVIORAL HEALTH | Facility: PHYSICIAN GROUP | Age: 53
End: 2018-06-06
Payer: MEDICARE

## 2018-06-06 VITALS
HEIGHT: 68 IN | DIASTOLIC BLOOD PRESSURE: 82 MMHG | SYSTOLIC BLOOD PRESSURE: 122 MMHG | BODY MASS INDEX: 27.66 KG/M2 | HEART RATE: 80 BPM | WEIGHT: 182.5 LBS

## 2018-06-06 DIAGNOSIS — F31.81 BIPOLAR II DISORDER (HCC): ICD-10-CM

## 2018-06-06 DIAGNOSIS — F90.9 ATTENTION DEFICIT HYPERACTIVITY DISORDER (ADHD), UNSPECIFIED ADHD TYPE: ICD-10-CM

## 2018-06-06 DIAGNOSIS — F41.1 GAD (GENERALIZED ANXIETY DISORDER): ICD-10-CM

## 2018-06-06 PROCEDURE — 99214 OFFICE O/P EST MOD 30 MIN: CPT | Performed by: NURSE PRACTITIONER

## 2018-06-06 RX ORDER — DEXTROAMPHETAMINE SACCHARATE, AMPHETAMINE ASPARTATE, DEXTROAMPHETAMINE SULFATE AND AMPHETAMINE SULFATE 2.5; 2.5; 2.5; 2.5 MG/1; MG/1; MG/1; MG/1
10 TABLET ORAL 2 TIMES DAILY
Qty: 60 TAB | Refills: 0 | Status: SHIPPED | OUTPATIENT
Start: 2018-07-05 | End: 2018-08-15 | Stop reason: SDUPTHER

## 2018-06-06 RX ORDER — LAMOTRIGINE 150 MG/1
150 TABLET ORAL
Qty: 90 TAB | Refills: 0 | Status: SHIPPED | OUTPATIENT
Start: 2018-06-06 | End: 2018-06-23 | Stop reason: SDUPTHER

## 2018-06-06 RX ORDER — DEXTROAMPHETAMINE SACCHARATE, AMPHETAMINE ASPARTATE, DEXTROAMPHETAMINE SULFATE AND AMPHETAMINE SULFATE 2.5; 2.5; 2.5; 2.5 MG/1; MG/1; MG/1; MG/1
10 TABLET ORAL 2 TIMES DAILY
Qty: 60 TAB | Refills: 0 | Status: SHIPPED | OUTPATIENT
Start: 2018-06-06 | End: 2018-06-06 | Stop reason: SDUPTHER

## 2018-06-06 NOTE — PROGRESS NOTES
[facility]  PSYCHIATRIC FOLLOW-UP NOTE    Name: Lily Delgado  MRN: [id number]  : 1965  Age: 52 y.o.  Date of assessment: 2018  PCP: Sandrita Sam D.O.  Persons in attendance: Patient  Total face-to-face time: 25 minutes    REASON FOR VISIT/CHIEF COMPLAINT (as stated by Patient):  Lily Delgado is a 52 y.o., White female, attending follow-up appointment for bipolar disorder, anxiety, ADHD (3 chronic conditions).      HISTORY OF PRESENT ILLNESS:    Bipolar disorder: lily reports she has had some mixed symptom presentation, has at times felt more manic and has had time with more depression. Felt more stable taking lamotrigine 200 mg daily, reports her liver enzymes did not decline with the dose decrease to lamotrigine 150 mg daily. She is not having suicidal thoughts, is looking forward to taking a trip with her  and throwing him a surprise party as well. She would like to increase her lamotrigine again after her next set of labs if her liver is looking better, ultrasound showed fatty liver disease and she has been working very hard at diet and exercise and has been able to lose some weight.     Anxiety: continues to feel anxious. Not sleeping as well as she was, is taking 100 mg trazodone at night.     ADHD: she has been taking Adderall and is again able to complete things she starts, is procrastinating less as well. She is mindful of her history of elevated bp, has been working hard at diet/exercise to control her health issues.     PSYCHOSOCIAL CHANGES SINCE PREVIOUS CONTACT:  No drinking or drug use      MEDICATION SIDE EFFECTS: none    REVIEW OF SYSTEMS:     General appearance: casually dressed  female, good grooming/hygiene. Pleasant and cooperative.   Constitutional negative - no fever/chills   Eyes not tested   Ears/Nose/Mouth/Throat not tested   Cardiovascular negative - no chest pain    Respiratory negative - no shortness of breath or acute distress  "  Gastrointestinal Fatty liver disease   Genitourinary not tested   Muscular not tested   Integumentary negative - no rash   Neurological negative   Endocrine not tested   Hematologic/Lymphatic not tested       PSYCHIATRIC EXAMINATION/MENTAL STATUS  /82   Pulse 80   Ht 1.727 m (5' 8\")   Wt 82.8 kg (182 lb 8 oz)   Breastfeeding? No   BMI 27.75 kg/m²   Participation: Active verbal participation, Attentive, Engaged and Open to feedback  Grooming:Casual and Neat  Orientation: Fully Oriented  Eye contact: Good  Behavior:Calm   Mood: Euthymic  Affect: Full range and Congruent with content  Thought process: Logical and Goal-directed  Thought content:  Within normal limits  Speech: Rate within normal limits and Volume within normal limits  Perception:  Within normal limits  Memory:  No gross evidence of memory deficits  Insight: Good  Judgment: Good  Family/couple interaction observations:   Other:    Current risk:    Suicide: Low   Homicide: Not applicable   Self-harm: Not applicable  Relapse: Not applicable  Other:   Crisis Safety Plan reviewed?No  If evidence of imminent risk is present, intervention/plan:    Medical Records/Labs/Diagnostic Tests Reviewed: none    Medical Records/Labs/Diagnostic Tests Ordered: none    DIAGNOSTIC IMPRESSION(S):  1. Bipolar II disorder (HCC)    2. DORENE (generalized anxiety disorder)    3. Attention deficit hyperactivity disorder (ADHD), unspecified ADHD type           ASSESSMENT AND PLAN:    Bipolar disorder: mood somewhat unstable lately. Will plan on dose increase of lamotrigine back to 200 mg daily pending review of next labs within the month to recheck her liver function. For now continue with lamotrigine 150 mg and bupropion  mg daily    Anxiety: continue to monitor, likely she will feel less anxious if we increase dose as planned in a month of her lamotrigine    ADHD: reviewed  Aware, patient is not filling stimulant medication from other providers and is filling " appropriately. Continue with Adderall 10 mg BID and continue to monitor bp      Current Outpatient Prescriptions:   •  [START ON 7/5/2018] amphetamine-dextroamphetamine (ADDERALL, 10MG,) 10 MG Tab, Take 1 Tab by mouth 2 times a day for 30 days., Disp: 60 Tab, Rfl: 0  •  lamotrigine (LAMICTAL) 150 MG tablet, Take 1 Tab by mouth every day., Disp: 90 Tab, Rfl: 0  •  traZODone (DESYREL) 100 MG Tab, TAKE 1/2 TO 1 TABLET BY  MOUTH AT BEDTIME AS NEEDED  FOR SLEEP, Disp: 90 Tab, Rfl: 0  •  buPROPion (WELLBUTRIN XL) 300 MG XL tablet, TAKE 1 TABLET BY MOUTH  EVERY MORNING, Disp: 90 Tab, Rfl: 0  •  omeprazole (PRILOSEC) 40 MG delayed-release capsule, Take 1 Cap by mouth every day., Disp: 90 Cap, Rfl: 1  •  levothyroxine (SYNTHROID) 100 MCG Tab, TAKE 1 TABLET BY MOUTH EVERY DAY, Disp: 90 Tab, Rfl: 3  •  lisinopril (PRINIVIL) 10 MG Tab, TAKE 1 TABLET BY MOUTH ONCE DAILY, Disp: 90 Tab, Rfl: 3  •  sumatriptan (IMITREX) 100 MG tablet, Take 100 mg by mouth Once PRN., Disp: , Rfl:     Recheck 4-6 weeks or sooner if needed    DANIEL Hurst.

## 2018-06-16 ENCOUNTER — HOSPITAL ENCOUNTER (OUTPATIENT)
Dept: LAB | Facility: MEDICAL CENTER | Age: 53
End: 2018-06-16
Attending: FAMILY MEDICINE
Payer: MEDICARE

## 2018-06-16 DIAGNOSIS — E03.9 ACQUIRED HYPOTHYROIDISM: ICD-10-CM

## 2018-06-16 DIAGNOSIS — E78.2 MIXED HYPERLIPIDEMIA: ICD-10-CM

## 2018-06-16 DIAGNOSIS — R79.89 ELEVATED LFTS: ICD-10-CM

## 2018-06-16 LAB
ALBUMIN SERPL BCP-MCNC: 4.7 G/DL (ref 3.2–4.9)
ALBUMIN/GLOB SERPL: 1.9 G/DL
ALP SERPL-CCNC: 76 U/L (ref 30–99)
ALT SERPL-CCNC: 65 U/L (ref 2–50)
ANION GAP SERPL CALC-SCNC: 6 MMOL/L (ref 0–11.9)
AST SERPL-CCNC: 32 U/L (ref 12–45)
BILIRUB SERPL-MCNC: 0.5 MG/DL (ref 0.1–1.5)
BUN SERPL-MCNC: 11 MG/DL (ref 8–22)
CALCIUM SERPL-MCNC: 9.7 MG/DL (ref 8.5–10.5)
CHLORIDE SERPL-SCNC: 102 MMOL/L (ref 96–112)
CHOLEST SERPL-MCNC: 188 MG/DL (ref 100–199)
CO2 SERPL-SCNC: 28 MMOL/L (ref 20–33)
CREAT SERPL-MCNC: 0.97 MG/DL (ref 0.5–1.4)
GLOBULIN SER CALC-MCNC: 2.5 G/DL (ref 1.9–3.5)
GLUCOSE SERPL-MCNC: 151 MG/DL (ref 65–99)
HDLC SERPL-MCNC: 36 MG/DL
LDLC SERPL CALC-MCNC: 124 MG/DL
POTASSIUM SERPL-SCNC: 4.8 MMOL/L (ref 3.6–5.5)
PROT SERPL-MCNC: 7.2 G/DL (ref 6–8.2)
SODIUM SERPL-SCNC: 136 MMOL/L (ref 135–145)
T4 FREE SERPL-MCNC: 1.06 NG/DL (ref 0.53–1.43)
TRIGL SERPL-MCNC: 142 MG/DL (ref 0–149)
TSH SERPL DL<=0.005 MIU/L-ACNC: 1.52 UIU/ML (ref 0.38–5.33)

## 2018-06-16 PROCEDURE — 84439 ASSAY OF FREE THYROXINE: CPT

## 2018-06-16 PROCEDURE — 84443 ASSAY THYROID STIM HORMONE: CPT

## 2018-06-16 PROCEDURE — 80061 LIPID PANEL: CPT

## 2018-06-16 PROCEDURE — 80053 COMPREHEN METABOLIC PANEL: CPT

## 2018-06-16 PROCEDURE — 36415 COLL VENOUS BLD VENIPUNCTURE: CPT

## 2018-06-21 ENCOUNTER — HOSPITAL ENCOUNTER (OUTPATIENT)
Facility: MEDICAL CENTER | Age: 53
End: 2018-06-21
Attending: FAMILY MEDICINE
Payer: MEDICARE

## 2018-06-21 ENCOUNTER — OFFICE VISIT (OUTPATIENT)
Dept: MEDICAL GROUP | Facility: PHYSICIAN GROUP | Age: 53
End: 2018-06-21
Payer: MEDICARE

## 2018-06-21 VITALS
HEART RATE: 110 BPM | OXYGEN SATURATION: 96 % | TEMPERATURE: 98 F | HEIGHT: 68 IN | WEIGHT: 184 LBS | BODY MASS INDEX: 27.89 KG/M2 | SYSTOLIC BLOOD PRESSURE: 110 MMHG | DIASTOLIC BLOOD PRESSURE: 74 MMHG

## 2018-06-21 DIAGNOSIS — M79.675 CHRONIC TOE PAIN, BILATERAL: ICD-10-CM

## 2018-06-21 DIAGNOSIS — E78.2 MIXED HYPERLIPIDEMIA: ICD-10-CM

## 2018-06-21 DIAGNOSIS — I10 ESSENTIAL HYPERTENSION: ICD-10-CM

## 2018-06-21 DIAGNOSIS — Z01.419 WELL WOMAN EXAM WITH ROUTINE GYNECOLOGICAL EXAM: ICD-10-CM

## 2018-06-21 DIAGNOSIS — G89.29 CHRONIC TOE PAIN, BILATERAL: ICD-10-CM

## 2018-06-21 DIAGNOSIS — R73.01 ELEVATED FASTING GLUCOSE: ICD-10-CM

## 2018-06-21 DIAGNOSIS — M79.674 CHRONIC TOE PAIN, BILATERAL: ICD-10-CM

## 2018-06-21 PROCEDURE — 88175 CYTOPATH C/V AUTO FLUID REDO: CPT

## 2018-06-21 PROCEDURE — 87624 HPV HI-RISK TYP POOLED RSLT: CPT

## 2018-06-21 PROCEDURE — G0101 CA SCREEN;PELVIC/BREAST EXAM: HCPCS | Performed by: FAMILY MEDICINE

## 2018-06-21 RX ORDER — LEVOTHYROXINE SODIUM 0.1 MG/1
100 TABLET ORAL
Qty: 90 TAB | Refills: 3 | Status: SHIPPED | OUTPATIENT
Start: 2018-06-21 | End: 2019-08-28 | Stop reason: SDUPTHER

## 2018-06-21 RX ORDER — LISINOPRIL 10 MG/1
TABLET ORAL
Qty: 90 TAB | Refills: 3 | Status: SHIPPED | OUTPATIENT
Start: 2018-06-21 | End: 2019-05-13

## 2018-06-21 ASSESSMENT — PATIENT HEALTH QUESTIONNAIRE - PHQ9: CLINICAL INTERPRETATION OF PHQ2 SCORE: 0

## 2018-06-21 NOTE — PROGRESS NOTES
SUBJECTIVE: 52 y.o. female for annual routine gynecologic exam  Chief Complaint   Patient presents with   • Medication Refill     one next testing strips    • Foot Problem     referral to podiatry        Obstetric History       T0      L0     SAB1   TAB0   Ectopic0   Molar0   Multiple0   Live Births0       Last Pap:   History   Sexual Activity   • Sexual activity: Yes   • Partners: Male   • Birth control/ protection: Surgical     Sexual history: currently sexually active, single partner   H/O Abnormal Pap no  Previous HIV testing? no  She  reports that she quit smoking about 21 months ago. She has never used smokeless tobacco.        Allergies: Patient has no known allergies.     ROS:    Reports no menopause symptoms of hot flashes, night sweats, sleep disruption, mood changes.Denies vaginal dryness.   No significant bloating/fluid retention, pelvic pain, or dyspareunia. No vaginal discharge   No breast tenderness, mass, nipple discharge, changes in size or contour, or abnormal cyclic discomfort.  No urinary tract symptoms, no incontinence.   No abdominal pain, change in bowel habits, black or bloody stools.    No unusual headaches, no visual changes, menstrual migraines   No prolonged cough. No dyspnea or chest pain on exertion.  No depression, labile mood, anxiety, libido changes, insomnia.  No polydipsia, polyuria, temperature intolerance.  No new/concerning skin lesions, concerns. No chest pain, no short of breath, no abdominal pain    Exercise: moderate regular exercise program  Preventive Care:mammogram; colonoscopy    Current medicines (including changes today)  Current Outpatient Prescriptions   Medication Sig Dispense Refill   • lisinopril (PRINIVIL) 10 MG Tab TAKE 1 TABLET BY MOUTH ONCE DAILY 90 Tab 3   • levothyroxine (SYNTHROID) 100 MCG Tab Take 1 Tab by mouth every day. 90 Tab 3   • Blood Glucose Monitoring Suppl Supplies Misc Test strips order: Test strips for one next meter. Sig: use  "3 and prn ssx high or low sugar #100 RF x 3 100 Strip 2   • [START ON 7/5/2018] amphetamine-dextroamphetamine (ADDERALL, 10MG,) 10 MG Tab Take 1 Tab by mouth 2 times a day for 30 days. 60 Tab 0   • lamotrigine (LAMICTAL) 150 MG tablet Take 1 Tab by mouth every day. 90 Tab 0   • traZODone (DESYREL) 100 MG Tab TAKE 1/2 TO 1 TABLET BY  MOUTH AT BEDTIME AS NEEDED  FOR SLEEP 90 Tab 0   • buPROPion (WELLBUTRIN XL) 300 MG XL tablet TAKE 1 TABLET BY MOUTH  EVERY MORNING 90 Tab 0   • omeprazole (PRILOSEC) 40 MG delayed-release capsule Take 1 Cap by mouth every day. 90 Cap 1   • sumatriptan (IMITREX) 100 MG tablet Take 100 mg by mouth Once PRN.       No current facility-administered medications for this visit.      She  has a past medical history of ADD (attention deficit disorder); Bipolar 1 disorder (HCC); Bipolar disorder (HCC); Chronic back pain; Depression; Diabetes (HCC); Insomnia; Migraine; and Thyroid disease.  She  has a past surgical history that includes abdominal hysterectomy total; primary c section; and pr enlarge breast with implant (2004).     Family History:   Family History   Problem Relation Age of Onset   • Bipolar disorder Father    • Alcohol abuse Father    • Diabetes Mother    • No Known Problems Sister    • Alcohol abuse Maternal Grandmother    • Diabetes Maternal Grandfather           OBJECTIVE:   /74   Pulse (!) 110   Temp 36.7 °C (98 °F)   Ht 1.727 m (5' 8\")   Wt 83.5 kg (184 lb)   SpO2 96%   BMI 27.98 kg/m²   Body mass index is 27.98 kg/m².    HEAD AND NECK:  Ears normal.  Throat, oral cavity and tongue normal.  Neck supple. No adenopathy or masses in the neck or supraclavicular regions.  No carotid bruits. No thyromegaly.    CHEST:  Clear, good air entry, no wheezes or rales.   HEART:  Regular rate and rhythm.  S1 and S2 normal.   No edema or JVD. ABDOMEN:  Soft without tenderness, guarding, mass or organomegaly.  No CVA tenderness or inguinal adenopathy.   EXTREMITIES:  Extremities, " reflexes and peripheral pulses are normal.   SKIN: color normal, vascularity normal, no edema, temperature normal   No rashes or suspicious skin lesions noted.     Breast Exam: Performed with instruction during examination. No axillary lymphadenopathy, no skin changes, no dominant masses. No nipple retraction    Pelvic Exam -  Normal external genitalia with no lesions. Normal vaginal mucosa with normal rugation and no discharge. No adnexal tenderness or enlargement appreciated. Thin Prep Pap is obtained, vaginal swab is obtained and specimen(s) sent to lab  Rectal: deferred    <ASSESSMENT and PLAN>  1. Well woman exam with routine gynecological exam  THINPREP PAP WITH HPV    No acute findings on exam; Pap collected; monitor for results   2. Mixed hyperlipidemia  LIPID PROFILE    Improved; continue healthy lifestyle; monitor   3. Elevated fasting glucose  Blood Glucose Monitoring Suppl Supplies Misc    COMP METABOLIC PANEL    HEMOGLOBIN A1C    Mild improvement; continue healthy lifestyle and recheck labs in 6 months. If no improvement may need to consider medication   4. Essential hypertension      Significantly improved; continue medications; consider lowering dosage at next appointment   5. Chronic toe pain, bilateral  REFERRAL TO ORTHOPEDICS    Uncontrolled; referral to orthopedist for further evaluation and treatment       Discussed  breast self exam, mammography screening, diet and exercise   Follow-up in 4 years for next Gyn exam and 4 years for next Pap.   Next office visit for recheck of chronic medical conditions is due in 6 months

## 2018-06-21 NOTE — ASSESSMENT & PLAN NOTE
Ongoing issue; recent labs have been reviewed in detail with the patient today which shows a continued elevation in her fasting glucose 151. This is an improvement over previous sugar. She states that she has made significant lifestyle changes including much healthier eating habits along with daily exercise.

## 2018-06-21 NOTE — ASSESSMENT & PLAN NOTE
Ongoing issue; recent labs been reviewed in detail with the patient today which shows a significant improvement in her lipid panel. As stated above patient has made some significant lifestyle changes including much healthier eating habits along with daily exercise.

## 2018-06-21 NOTE — ASSESSMENT & PLAN NOTE
This problem is new to me. Patient is reporting long-standing history of pain in all toes on both feet. She has had previous surgery. She states that recently she starting having more pain because she is hiking for exercise.

## 2018-06-21 NOTE — ASSESSMENT & PLAN NOTE
Ongoing issue; patient compliant with lisinopril 10 mg; denies any headache, dizziness, chest pain; current blood pressure is within recommended range.

## 2018-06-22 DIAGNOSIS — Z01.419 WELL WOMAN EXAM WITH ROUTINE GYNECOLOGICAL EXAM: ICD-10-CM

## 2018-06-22 LAB — AMBIGUOUS DTTM AMBI4: NORMAL

## 2018-06-25 RX ORDER — LAMOTRIGINE 150 MG/1
TABLET ORAL
Qty: 90 TAB | Refills: 0 | Status: SHIPPED | OUTPATIENT
Start: 2018-06-25 | End: 2018-08-15 | Stop reason: SDUPTHER

## 2018-06-26 LAB
CYTOLOGY REG CYTOL: ABNORMAL
HPV HR 12 DNA CVX QL NAA+PROBE: POSITIVE
HPV16 DNA SPEC QL NAA+PROBE: NEGATIVE
HPV18 DNA SPEC QL NAA+PROBE: NEGATIVE
SPECIMEN SOURCE: ABNORMAL

## 2018-06-29 ENCOUNTER — TELEPHONE (OUTPATIENT)
Dept: MEDICAL GROUP | Facility: PHYSICIAN GROUP | Age: 53
End: 2018-06-29

## 2018-06-30 NOTE — TELEPHONE ENCOUNTER
----- Message from Sandrita Sam D.O. sent at 6/28/2018  9:21 AM PDT -----  Please advise pt that the Pap smear did not show any type of cancer but was positive for one type of HPV. We should repeat this test in one year.    Sandrita Sam D.O.

## 2018-07-17 RX ORDER — OMEPRAZOLE 40 MG/1
40 CAPSULE, DELAYED RELEASE ORAL DAILY
Qty: 90 CAP | Refills: 0 | Status: SHIPPED | OUTPATIENT
Start: 2018-07-17 | End: 2018-11-10 | Stop reason: SDUPTHER

## 2018-08-15 ENCOUNTER — OFFICE VISIT (OUTPATIENT)
Dept: BEHAVIORAL HEALTH | Facility: PHYSICIAN GROUP | Age: 53
End: 2018-08-15
Payer: MEDICARE

## 2018-08-15 VITALS
DIASTOLIC BLOOD PRESSURE: 80 MMHG | SYSTOLIC BLOOD PRESSURE: 118 MMHG | RESPIRATION RATE: 16 BRPM | HEIGHT: 68 IN | HEART RATE: 58 BPM

## 2018-08-15 DIAGNOSIS — F41.1 GAD (GENERALIZED ANXIETY DISORDER): ICD-10-CM

## 2018-08-15 DIAGNOSIS — F31.81 BIPOLAR II DISORDER (HCC): ICD-10-CM

## 2018-08-15 DIAGNOSIS — F90.9 ATTENTION DEFICIT HYPERACTIVITY DISORDER (ADHD), UNSPECIFIED ADHD TYPE: ICD-10-CM

## 2018-08-15 PROCEDURE — 90833 PSYTX W PT W E/M 30 MIN: CPT | Performed by: NURSE PRACTITIONER

## 2018-08-15 PROCEDURE — 99213 OFFICE O/P EST LOW 20 MIN: CPT | Performed by: NURSE PRACTITIONER

## 2018-08-15 RX ORDER — DEXTROAMPHETAMINE SACCHARATE, AMPHETAMINE ASPARTATE, DEXTROAMPHETAMINE SULFATE AND AMPHETAMINE SULFATE 2.5; 2.5; 2.5; 2.5 MG/1; MG/1; MG/1; MG/1
10 TABLET ORAL 2 TIMES DAILY
Qty: 60 TAB | Refills: 0 | Status: SHIPPED | OUTPATIENT
Start: 2018-09-14 | End: 2018-10-14

## 2018-08-15 RX ORDER — DEXTROAMPHETAMINE SACCHARATE, AMPHETAMINE ASPARTATE, DEXTROAMPHETAMINE SULFATE AND AMPHETAMINE SULFATE 2.5; 2.5; 2.5; 2.5 MG/1; MG/1; MG/1; MG/1
10 TABLET ORAL 2 TIMES DAILY
Qty: 60 TAB | Refills: 0 | Status: SHIPPED | OUTPATIENT
Start: 2018-08-15 | End: 2018-09-14

## 2018-08-15 RX ORDER — LAMOTRIGINE 150 MG/1
150 TABLET ORAL
Qty: 90 TAB | Refills: 0 | Status: SHIPPED | OUTPATIENT
Start: 2018-08-15 | End: 2018-12-14 | Stop reason: SDUPTHER

## 2018-08-15 RX ORDER — DEXTROAMPHETAMINE SACCHARATE, AMPHETAMINE ASPARTATE, DEXTROAMPHETAMINE SULFATE AND AMPHETAMINE SULFATE 2.5; 2.5; 2.5; 2.5 MG/1; MG/1; MG/1; MG/1
10 TABLET ORAL 2 TIMES DAILY
Qty: 60 TAB | Refills: 0 | Status: SHIPPED | OUTPATIENT
Start: 2018-10-13 | End: 2018-11-12

## 2018-08-15 RX ORDER — BUPROPION HYDROCHLORIDE 300 MG/1
TABLET ORAL
Qty: 90 TAB | Refills: 0 | Status: SHIPPED | OUTPATIENT
Start: 2018-08-15 | End: 2019-07-29

## 2018-08-15 RX ORDER — MELOXICAM 15 MG/1
15 TABLET ORAL 2 TIMES DAILY
COMMUNITY
Start: 2018-08-13 | End: 2019-01-02

## 2018-08-15 RX ORDER — TRAZODONE HYDROCHLORIDE 100 MG/1
TABLET ORAL
Qty: 90 TAB | Refills: 0 | Status: SHIPPED | OUTPATIENT
Start: 2018-08-15

## 2018-08-15 NOTE — PROGRESS NOTES
[facility]  PSYCHIATRIC FOLLOW-UP NOTE    Name: Frances Delgado  MRN: [id number]  : 1965  Age: 52 y.o.  Date of assessment: 8/15/2018  PCP: Sandrita Sam D.O.  Persons in attendance: Patient  Total face-to-face time: 26 minutes    REASON FOR VISIT/CHIEF COMPLAINT (as stated by Patient):  Frances Delgado is a 52 y.o., White female, attending follow-up appointment for recheck of bipolar disorder, anxiety, ADHD.      HISTORY OF PRESENT ILLNESS:    Frances has been doing okay as far as depression goes, is having good motivation to go to the gym, is not feeling down or uninterested in activities. She still takes trazodone to help with sleep at night. Her anxiety has increased over the past couple of months and she is noticing herself more anxious, is 10 days away from the surprise party she has been planning for months now for her  and is finding that sneaking around to keep things secret has caused her some anxiety. Hoping everything turns out and the people he cares about will all be able to attend. She is still taking lamotrigine and bupropion as well. Continues to have better focus and is able to get things finished with the help of Adderall for her ADHD.     PSYCHOSOCIAL CHANGES SINCE PREVIOUS CONTACT:  Denies drinking, no drug use      MEDICATION SIDE EFFECTS: none    REVIEW OF SYSTEMS:      General appearance: casually dressed  female, good grooming/hygiene. Pleasant and cooperative  Constitutional negative - no fever/chills   Eyes not tested   Ears/Nose/Mouth/Throat not tested   Cardiovascular Noticing she cannot get her pulse elevated during workouts with blood pressure medication, no tachycardia   Respiratory negative - no shortness of breath or acute distress   Gastrointestinal not tested   Genitourinary not tested   Muscular not tested   Integumentary negative   Neurological not tested   Endocrine Glucose elevated on last lab result at 151, taking levothyroxine for  "hypothyroidism   Hematologic/Lymphatic not tested       PSYCHIATRIC EXAMINATION/MENTAL STATUS  /80   Pulse (!) 58   Resp 16   Ht 1.727 m (5' 8\")   Breastfeeding? No   Participation: Active verbal participation, Attentive, Engaged and Open to feedback  Grooming:Casual and Neat  Orientation: Fully Oriented  Eye contact: Good  Behavior:Tense   Mood: Anxious  Affect: Full range and Congruent with content  Thought process: Logical and Goal-directed  Thought content:  Within normal limits  Speech: Rate within normal limits and Volume within normal limits  Perception:  Within normal limits  Memory:  No gross evidence of memory deficits  Insight: Good  Judgment: Good  Family/couple interaction observations:   Other:    Current risk:    Suicide: Low   Homicide: Not applicable   Self-harm: Not applicable  Relapse: Not applicable  Other:   Crisis Safety Plan reviewed?No  If evidence of imminent risk is present, intervention/plan:    Medical Records/Labs/Diagnostic Tests Reviewed: from 6/16/18    Component Results     Component Value Ref Range & Units Status   Sodium 136  135 - 145 mmol/L Final   Potassium 4.8  3.6 - 5.5 mmol/L Final   Chloride 102  96 - 112 mmol/L Final   Co2 28  20 - 33 mmol/L Final   Anion Gap 6.0  0.0 - 11.9 Final   Glucose 151   65 - 99 mg/dL Final   Bun 11  8 - 22 mg/dL Final   Creatinine 0.97  0.50 - 1.40 mg/dL Final   Calcium 9.7  8.5 - 10.5 mg/dL Final   AST(SGOT) 32  12 - 45 U/L Final   ALT(SGPT) 65   2 - 50 U/L Final   Alkaline Phosphatase 76  30 - 99 U/L Final   Total Bilirubin 0.5  0.1 - 1.5 mg/dL Final   Albumin 4.7  3.2 - 4.9 g/dL Final   Total Protein 7.2  6.0 - 8.2 g/dL Final   Globulin 2.5  1.9 - 3.5 g/dL Final   A-G Ratio 1.9  g/dL Final     GFR >60    Mildly elevated ALT, AST is again normal. Glucose remains high at 151, creatinine normal at 0.97    Medical Records/Labs/Diagnostic Tests Ordered: none    DIAGNOSTIC IMPRESSION(S):  1. Bipolar II disorder (HCC)    2. DORENE (generalized " anxiety disorder)    3. Attention deficit hyperactivity disorder (ADHD), unspecified ADHD type           ASSESSMENT AND PLAN:  -increased anxiety that is situational, will continue to monitor as the stress of the party she is planning is coming to an end. No change in medication.  -no depression or ingrid, continue with lamotrigine at current dose  -continue with Adderall for ADHD, symptoms are controlled  -discussed that this provider is leaving the practice, this patient does not feel she can tolerate changing providers and will continue to see this provider. Discussed time frame of change, and that her medications were updated for 3 months.  -recommend follow up in 2-3 months or sooner for negative changes in mood or medication concerns.      Current Outpatient Prescriptions:   •  meloxicam (MOBIC) 15 MG tablet, Take 15 mg by mouth 2 Times a Day., Disp: , Rfl:   •  amphetamine-dextroamphetamine (ADDERALL, 10MG,) 10 MG Tab, Take 1 Tab by mouth 2 times a day for 30 days., Disp: 60 Tab, Rfl: 0  •  [START ON 9/14/2018] amphetamine-dextroamphetamine (ADDERALL) 10 MG Tab, Take 1 Tab by mouth 2 times a day for 30 days., Disp: 60 Tab, Rfl: 0  •  [START ON 10/13/2018] amphetamine-dextroamphetamine (ADDERALL) 10 MG Tab, Take 1 Tab by mouth 2 times a day for 30 days., Disp: 60 Tab, Rfl: 0  •  lamotrigine (LAMICTAL) 150 MG tablet, Take 1 Tab by mouth every day., Disp: 90 Tab, Rfl: 0  •  traZODone (DESYREL) 100 MG Tab, TAKE 1/2 TO 1 TABLET BY  MOUTH AT BEDTIME AS NEEDED  FOR SLEEP, Disp: 90 Tab, Rfl: 0  •  buPROPion (WELLBUTRIN XL) 300 MG XL tablet, TAKE 1 TABLET BY MOUTH  EVERY MORNING, Disp: 90 Tab, Rfl: 0  •  omeprazole (PRILOSEC) 40 MG delayed-release capsule, Take 1 Cap by mouth every day., Disp: 90 Cap, Rfl: 0  •  lisinopril (PRINIVIL) 10 MG Tab, TAKE 1 TABLET BY MOUTH ONCE DAILY, Disp: 90 Tab, Rfl: 3  •  levothyroxine (SYNTHROID) 100 MCG Tab, Take 1 Tab by mouth every day., Disp: 90 Tab, Rfl: 3  •  sumatriptan (IMITREX)  100 MG tablet, Take 100 mg by mouth Once PRN., Disp: , Rfl:   •  Blood Glucose Monitoring Suppl Supplies Misc, Test strips order: Test strips for one next meter. Sig: use 3 and prn ssx high or low sugar #100 RF x 3, Disp: 100 Strip, Rfl: 2        16 minutes were spent in psychotherapy.  (If greater than 16 minutes, add [id number] code)   Topics addressed in psychotherapy include: supportive. Identification of stressors from situations affecting mood vs a true mood switch of bipolar. Patient processed problems with care transition with the termination of relationships with past psych providers, her reaction in the past and managing the issue currently.    DANIEL Hurst.

## 2018-12-14 RX ORDER — LAMOTRIGINE 150 MG/1
150 TABLET ORAL
Qty: 90 TAB | Refills: 0 | Status: SHIPPED | OUTPATIENT
Start: 2018-12-14 | End: 2019-07-29

## 2018-12-14 NOTE — TELEPHONE ENCOUNTER
Was the patient seen in the last year in this department? Yes LOV 06/21/18 establishing with Sis on 01/02/19    Does patient have an active prescription for medications requested? No     Received Request Via: Pharmacy

## 2018-12-28 ENCOUNTER — HOSPITAL ENCOUNTER (OUTPATIENT)
Dept: LAB | Facility: MEDICAL CENTER | Age: 53
End: 2018-12-28
Attending: NURSE PRACTITIONER
Payer: MEDICARE

## 2018-12-28 DIAGNOSIS — F41.1 GAD (GENERALIZED ANXIETY DISORDER): ICD-10-CM

## 2018-12-28 DIAGNOSIS — I10 ESSENTIAL HYPERTENSION: ICD-10-CM

## 2018-12-28 DIAGNOSIS — R73.01 ELEVATED FASTING GLUCOSE: ICD-10-CM

## 2018-12-28 DIAGNOSIS — E78.2 MIXED HYPERLIPIDEMIA: ICD-10-CM

## 2018-12-28 DIAGNOSIS — F31.81 BIPOLAR II DISORDER (HCC): ICD-10-CM

## 2018-12-28 DIAGNOSIS — E03.9 ACQUIRED HYPOTHYROIDISM: ICD-10-CM

## 2018-12-28 LAB
ALBUMIN SERPL BCP-MCNC: 4.6 G/DL (ref 3.2–4.9)
ALBUMIN/GLOB SERPL: 1.8 G/DL
ALP SERPL-CCNC: 88 U/L (ref 30–99)
ALT SERPL-CCNC: 62 U/L (ref 2–50)
ANION GAP SERPL CALC-SCNC: 9 MMOL/L (ref 0–11.9)
AST SERPL-CCNC: 34 U/L (ref 12–45)
BILIRUB SERPL-MCNC: 0.6 MG/DL (ref 0.1–1.5)
BUN SERPL-MCNC: 14 MG/DL (ref 8–22)
CALCIUM SERPL-MCNC: 9.8 MG/DL (ref 8.5–10.5)
CHLORIDE SERPL-SCNC: 99 MMOL/L (ref 96–112)
CHOLEST SERPL-MCNC: 243 MG/DL (ref 100–199)
CO2 SERPL-SCNC: 29 MMOL/L (ref 20–33)
CREAT SERPL-MCNC: 1.01 MG/DL (ref 0.5–1.4)
GLOBULIN SER CALC-MCNC: 2.6 G/DL (ref 1.9–3.5)
GLUCOSE SERPL-MCNC: 156 MG/DL (ref 65–99)
HDLC SERPL-MCNC: 48 MG/DL
LDLC SERPL CALC-MCNC: 154 MG/DL
POTASSIUM SERPL-SCNC: 4.3 MMOL/L (ref 3.6–5.5)
PROT SERPL-MCNC: 7.2 G/DL (ref 6–8.2)
SODIUM SERPL-SCNC: 137 MMOL/L (ref 135–145)
TRIGL SERPL-MCNC: 203 MG/DL (ref 0–149)

## 2018-12-28 PROCEDURE — 80061 LIPID PANEL: CPT

## 2018-12-28 PROCEDURE — 83036 HEMOGLOBIN GLYCOSYLATED A1C: CPT

## 2018-12-28 PROCEDURE — 36415 COLL VENOUS BLD VENIPUNCTURE: CPT

## 2018-12-28 PROCEDURE — 80053 COMPREHEN METABOLIC PANEL: CPT

## 2018-12-29 LAB
EST. AVERAGE GLUCOSE BLD GHB EST-MCNC: 171 MG/DL
HBA1C MFR BLD: 7.6 % (ref 0–5.6)

## 2019-01-02 ENCOUNTER — OFFICE VISIT (OUTPATIENT)
Dept: MEDICAL GROUP | Facility: PHYSICIAN GROUP | Age: 54
End: 2019-01-02
Payer: MEDICARE

## 2019-01-02 VITALS
SYSTOLIC BLOOD PRESSURE: 124 MMHG | BODY MASS INDEX: 28.04 KG/M2 | DIASTOLIC BLOOD PRESSURE: 78 MMHG | HEIGHT: 68 IN | WEIGHT: 185 LBS | TEMPERATURE: 97.7 F | HEART RATE: 90 BPM | OXYGEN SATURATION: 96 %

## 2019-01-02 DIAGNOSIS — G62.9 PERIPHERAL POLYNEUROPATHY: ICD-10-CM

## 2019-01-02 DIAGNOSIS — E03.9 ACQUIRED HYPOTHYROIDISM: ICD-10-CM

## 2019-01-02 DIAGNOSIS — I10 ESSENTIAL HYPERTENSION: ICD-10-CM

## 2019-01-02 DIAGNOSIS — F41.1 GAD (GENERALIZED ANXIETY DISORDER): ICD-10-CM

## 2019-01-02 DIAGNOSIS — F31.81 BIPOLAR II DISORDER (HCC): ICD-10-CM

## 2019-01-02 DIAGNOSIS — K21.9 GASTROESOPHAGEAL REFLUX DISEASE WITHOUT ESOPHAGITIS: ICD-10-CM

## 2019-01-02 DIAGNOSIS — F90.9 ATTENTION DEFICIT HYPERACTIVITY DISORDER (ADHD), UNSPECIFIED ADHD TYPE: ICD-10-CM

## 2019-01-02 DIAGNOSIS — Z87.891 HISTORY OF SMOKING AT LEAST 1 PACK PER DAY FOR AT LEAST 30 YEARS: ICD-10-CM

## 2019-01-02 DIAGNOSIS — E78.2 MIXED HYPERLIPIDEMIA: ICD-10-CM

## 2019-01-02 DIAGNOSIS — M67.40 GANGLION CYST: ICD-10-CM

## 2019-01-02 DIAGNOSIS — Z23 NEED FOR VACCINATION: ICD-10-CM

## 2019-01-02 DIAGNOSIS — E11.9 TYPE 2 DIABETES MELLITUS WITHOUT COMPLICATION, WITHOUT LONG-TERM CURRENT USE OF INSULIN (HCC): Primary | ICD-10-CM

## 2019-01-02 DIAGNOSIS — M54.50 CHRONIC BILATERAL LOW BACK PAIN WITHOUT SCIATICA: ICD-10-CM

## 2019-01-02 DIAGNOSIS — G89.29 CHRONIC BILATERAL LOW BACK PAIN WITHOUT SCIATICA: ICD-10-CM

## 2019-01-02 PROBLEM — R73.01 ELEVATED FASTING GLUCOSE: Status: RESOLVED | Noted: 2017-05-09 | Resolved: 2019-01-02

## 2019-01-02 PROCEDURE — 90732 PPSV23 VACC 2 YRS+ SUBQ/IM: CPT | Performed by: NURSE PRACTITIONER

## 2019-01-02 PROCEDURE — G0008 ADMIN INFLUENZA VIRUS VAC: HCPCS | Performed by: NURSE PRACTITIONER

## 2019-01-02 PROCEDURE — 90686 IIV4 VACC NO PRSV 0.5 ML IM: CPT | Performed by: NURSE PRACTITIONER

## 2019-01-02 PROCEDURE — G0009 ADMIN PNEUMOCOCCAL VACCINE: HCPCS | Performed by: NURSE PRACTITIONER

## 2019-01-02 PROCEDURE — 99215 OFFICE O/P EST HI 40 MIN: CPT | Mod: 25 | Performed by: NURSE PRACTITIONER

## 2019-01-02 RX ORDER — ATORVASTATIN CALCIUM 20 MG/1
20 TABLET, FILM COATED ORAL
Qty: 90 TAB | Refills: 3 | Status: SHIPPED | OUTPATIENT
Start: 2019-01-02 | End: 2019-11-13 | Stop reason: SDUPTHER

## 2019-01-02 RX ORDER — DEXTROAMPHETAMINE SACCHARATE, AMPHETAMINE ASPARTATE, DEXTROAMPHETAMINE SULFATE AND AMPHETAMINE SULFATE 2.5; 2.5; 2.5; 2.5 MG/1; MG/1; MG/1; MG/1
10 TABLET ORAL 2 TIMES DAILY
COMMUNITY
End: 2020-01-14

## 2019-01-02 RX ORDER — GABAPENTIN 100 MG/1
100 CAPSULE ORAL 3 TIMES DAILY
Qty: 270 CAP | Refills: 0 | Status: SHIPPED | OUTPATIENT
Start: 2019-01-02 | End: 2019-02-25 | Stop reason: SDUPTHER

## 2019-01-02 NOTE — LETTER
[facility]  CRISTINA Gallegos  [address]  [place] [address]  Fax: [phone]   Authorization for Release/Disclosure of   Protected Health Information   Name: JOHN LOPEZ : 1965 SSN: [id number]   Address: [address] [place] [address]  [place] [address] Phone:    [phone] (home)    I authorize the entity listed below to release/disclose the PHI below to:   [facility]/CRISTINA Gallegos and CRISTINA Gallegos   Provider or Entity Name:  [facility]   Address   City, State, Zip   Phone:      Fax:     Reason for request: continuity of care   Information to be released:    [X] LAST COLONOSCOPY,  including any PATH REPORT and follow-up  [  ] LAST FIT/COLOGUARD RESULT [  ] LAST DEXA  [  ] LAST MAMMOGRAM  [  ] LAST PAP  [  ] LAST LABS [  ] RETINA EXAM REPORT  [  ] IMMUNIZATION RECORDS  [  ] Release all info      [  ] Check here and initial the line next to each item to release ALL health information INCLUDING  _____ Care and treatment for drug and / or alcohol abuse  _____ HIV testing, infection status, or AIDS  _____ Genetic Testing    DATES OF SERVICE OR TIME PERIOD TO BE DISCLOSED: _____________  I understand and acknowledge that:  * This Authorization may be revoked at any time by you in writing, except if your health information has already been used or disclosed.  * Your health information that will be used or disclosed as a result of you signing this authorization could be re-disclosed by the recipient. If this occurs, your re-disclosed health information may no longer be protected by State or Federal laws.  * You may refuse to sign this Authorization. Your refusal will not affect your ability to obtain treatment.  * This Authorization becomes effective upon signing and will  on (date) __________.      If no date is indicated, this Authorization will  one (1) year from the signature date.    Name: John Lopez    Signature:   Date:     2019          PLEASE FAX REQUESTED RECORDS BACK TO: [phone]

## 2019-01-02 NOTE — LETTER
[facility]  CRISTINA Gallegos  [address]  [place] [address]  Fax: [phone]   Authorization for Release/Disclosure of   Protected Health Information   Name: JOHN LOPEZ : 1965 SSN: [id number]   Address: [address] [place] [address]  [place] [address] Phone:    [phone] (home)    I authorize the entity listed below to release/disclose the PHI below to:   [facility]/CRISTINA Gallegos and CRISTINA Gallegos   Provider or Entity Name:  Dr. Ren Garcia   Address   [facility], [facility], [facility] Phone:      Fax:     Reason for request: continuity of care   Information to be released:    [  ] LAST COLONOSCOPY,  including any PATH REPORT and follow-up  [  ] LAST FIT/COLOGUARD RESULT [  ] LAST DEXA  [  ] LAST MAMMOGRAM  [  ] LAST PAP  [  ] LAST LABS [  ] RETINA EXAM REPORT  [  ] IMMUNIZATION RECORDS  [X] Release all info      [  ] Check here and initial the line next to each item to release ALL health information INCLUDING  _____ Care and treatment for drug and / or alcohol abuse  _____ HIV testing, infection status, or AIDS  _____ Genetic Testing    DATES OF SERVICE OR TIME PERIOD TO BE DISCLOSED: _____________  I understand and acknowledge that:  * This Authorization may be revoked at any time by you in writing, except if your health information has already been used or disclosed.  * Your health information that will be used or disclosed as a result of you signing this authorization could be re-disclosed by the recipient. If this occurs, your re-disclosed health information may no longer be protected by State or Federal laws.  * You may refuse to sign this Authorization. Your refusal will not affect your ability to obtain treatment.  * This Authorization becomes effective upon signing and will  on (date) __________.      If no date is indicated, this Authorization will  one (1) year from the signature date.    Name: John Lopez    Signature:   Date:       1/2/2019       PLEASE FAX REQUESTED RECORDS BACK TO: [phone]

## 2019-01-03 PROBLEM — I10 ESSENTIAL HYPERTENSION: Chronic | Status: ACTIVE | Noted: 2017-05-09

## 2019-01-03 PROBLEM — E78.2 MIXED HYPERLIPIDEMIA: Chronic | Status: ACTIVE | Noted: 2017-05-09

## 2019-01-03 PROBLEM — E11.9 TYPE 2 DIABETES MELLITUS WITHOUT COMPLICATION, WITHOUT LONG-TERM CURRENT USE OF INSULIN (HCC): Chronic | Status: ACTIVE | Noted: 2019-01-02

## 2019-01-03 PROBLEM — E03.9 ACQUIRED HYPOTHYROIDISM: Chronic | Status: ACTIVE | Noted: 2018-03-29

## 2019-01-03 PROBLEM — K21.9 GASTROESOPHAGEAL REFLUX DISEASE WITHOUT ESOPHAGITIS: Chronic | Status: ACTIVE | Noted: 2018-03-29

## 2019-01-03 NOTE — ASSESSMENT & PLAN NOTE
This is a chronic problem for the patient that is stable.  The patient reports that she has a history of smoking at least 1 pack/day for 35+ years.  She is asking about the lung cancer screening program that she saw a commercial for.  Reviewed requirements for the referral and patient qualifies.

## 2019-01-03 NOTE — ASSESSMENT & PLAN NOTE
"This is a chronic problem for the patient that is worsening.  The patient's most recent lipid profile is as follows:  Lab Results  Component Value Date/Time   CHOLSTRLTOT 243 (H) 12/28/2018 1146   TRIGLYCERIDE 203 (H) 12/28/2018 1146   HDL 48 12/28/2018 1146    (H) 12/28/2018 1146   The patient's lipid profile has been progressively worsening since 2017.  The patient had come to her prior PCP in the past for this issue and was advised to make lifestyle changes including healthy diet, weight loss, and exercise.  The patient states that at that time she had included walking 5 miles per day, and including a \"beyond diet\" which is organic whole foods.  Patient states she lost about 18 pounds at this time.  She became discouraged after working so hard on changing her lifestyle and seeing her lab values only worsen.  Reviewed with the patient her 10-year cardiac risk of 6.67% and her diagnosis of diabetes.  "

## 2019-01-03 NOTE — ASSESSMENT & PLAN NOTE
This is a chronic problem that is uncontrolled. The patient's hemoglobin A1c on 5/18/2017 was elevated at 6.5 with a fasting blood sugar of 112.  In September 2017 the patient's fasting blood sugar was 135.  On 3/26/2018 the patient's hemoglobin A1c was 7.1 with a fasting blood sugar of 153.  On 6/16/2018 the patient's fasting blood sugar was 151.  And most recently on 12/28/2018 the patient's hemoglobin A1c was 7.6 with a fasting blood sugar of 156.  The patient states that when she lived in [facility] she had been diagnosed as prediabetic and had been on metformin 500 mg 1 time daily for a short amount of time.  After moving to [facility], the patient's new PCP recommended that she try to control her elevated fasting blood sugar and elevated hemoglobin A1c with lifestyle changes including healthy diet and weight loss and exercise.  The patient reports that she tried very hard by changing her diet and increasing her exercise including running 5 miles per day.  She became very discouraged after all of these lifestyle changes were adopted but she saw no change in her lab results.  She recently had lab work done on 12/28/2018 which showed her hemoglobin A1c to be 7.6.  The patient is concerned that this value has been increasing over the last 3+ years and she has not been treated for this problem.  She is interested in restarting metformin to get this under control.

## 2019-01-03 NOTE — ASSESSMENT & PLAN NOTE
This is a chronic problem for the patient that is uncontrolled.  Patient had previously been referred to hand and foot surgery for this issue.  The patient states that she had one ganglion cyst in her left palm and now she has 2.  She had an appointment with the surgeon and was told that surgery would most likely create more scar tissue and more pain for her than doing nothing.  Patient opted to not pursue the surgical treatment and is just avoiding anything that puts pressure on her left palm for too long.

## 2019-01-03 NOTE — ASSESSMENT & PLAN NOTE
This is a chronic problem for the patient that is managed by psych.  The patient has been having follow-up appointments with psych every month currently as she does not feel that she is doing as well as normal and they are making adjustments to her medications.

## 2019-01-03 NOTE — ASSESSMENT & PLAN NOTE
"This is a chronic problem for the patient that has been ongoing since around 2008.  Patient reports that she had in the past had very severe depression that had ended her up being hospitalized twice in the past.  The patient had gone on disability related to this issue in January 2010.  Around that time she had 18 months of ECT therapy which helped her depression but \"ruined her memory \".  This in turn also affected her ability to work as an .  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  Patient states that due to the effects of the ECT therapy she is on permanent disability and it has affected her memory quite significantly.  The patient follows up with psych every other month if she is doing well or every month if she is not doing well or having medication adjustments, which she currently is having.  "

## 2019-01-03 NOTE — ASSESSMENT & PLAN NOTE
This is a chronic problem for the patient that is controlled with omeprazole 40 mg daily.  Patient states that since starting this medication she has had no episodes of acid reflux, regurgitation, burning in the chest.

## 2019-01-03 NOTE — ASSESSMENT & PLAN NOTE
"This is a chronic problem for the patient for which she has not been seen for in the past.  Patient reports that she has constant numbness on bilateral feet, especially bilateral heels.  She also reports that her feet have a constant \"pins and needles\" feeling as well as a itching sensation.  "

## 2019-01-03 NOTE — ASSESSMENT & PLAN NOTE
This is a chronic problem for the patient that is managed by psych.  The patient reports that she takes Adderall 10 mg twice daily which helps her to keep focus.  She follows up with psych every other month if she is doing well or every month if she is not doing well or they are adjusting medications.  Currently she is being seen monthly.
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
## 2019-01-03 NOTE — ASSESSMENT & PLAN NOTE
This is a chronic problem for the patient that is ongoing.  The patient continues to take levothyroxine 100 mcg/day. The patient's last thyroid testing was completed on 6/16/2018 with a TSH 1.520 and free T4 1.06.  Patient reports that she has been experiencing occasional palpitations, weight gain, and temperature intolerance to both hot and cold.
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
## 2019-01-07 ENCOUNTER — TELEPHONE (OUTPATIENT)
Dept: HEMATOLOGY ONCOLOGY | Facility: MEDICAL CENTER | Age: 54
End: 2019-01-07

## 2019-01-07 DIAGNOSIS — E11.9 TYPE 2 DIABETES MELLITUS WITHOUT COMPLICATION, WITHOUT LONG-TERM CURRENT USE OF INSULIN (HCC): Chronic | ICD-10-CM

## 2019-01-07 NOTE — TELEPHONE ENCOUNTER
Received referral to lung cancer screening program.  Chart review to assess for lung cancer screening program eligibility.   1. Age 55-77 yrs of age? Yes 53 y.o.  2. 30 pack year hx of smoking, or greater? Yes 1 [id number]  3. Current smoker or if quit, has pt quit within last 15 yrs?Yes  Quit 2016  4. Any signs or symptoms of lung cancer? None noted  5. Previous history of lung cancer? None noted  6. Chest CT within past 12 mos.? None noted  Patient does meet eligibility criteria. [facility] scheduling notified to schedule the shared decision making visit.

## 2019-01-24 ENCOUNTER — TELEPHONE (OUTPATIENT)
Dept: HEMATOLOGY ONCOLOGY | Facility: MEDICAL CENTER | Age: 54
End: 2019-01-24

## 2019-01-24 NOTE — TELEPHONE ENCOUNTER
1st attempt to contact the patient,- left voicemail for patient requesting a return call to schedule their shared decision making appointment.   [facility]

## 2019-02-08 ENCOUNTER — TELEPHONE (OUTPATIENT)
Dept: HEMATOLOGY ONCOLOGY | Facility: MEDICAL CENTER | Age: 54
End: 2019-02-08

## 2019-02-08 NOTE — TELEPHONE ENCOUNTER
1st attempt    Left message for patient to return call in regards to her missed lung cancer screening appointment on 2/7/19 at 3:00 pm.

## 2019-02-12 NOTE — TELEPHONE ENCOUNTER
2nd attempt    Left message for patient to return call in regards to her missed lung cancer screening appointment on 2/7/19 at 3:00 pm.

## 2019-02-23 NOTE — TELEPHONE ENCOUNTER
3rd attempt    Left message for patient to return call in regards to her missed lung cancer screening appointment on 2/7/19 at 3:00 pm.     Letter mailed to patient.

## 2019-02-25 DIAGNOSIS — G62.9 PERIPHERAL POLYNEUROPATHY: ICD-10-CM

## 2019-02-25 DIAGNOSIS — E11.9 TYPE 2 DIABETES MELLITUS WITHOUT COMPLICATION, WITHOUT LONG-TERM CURRENT USE OF INSULIN (HCC): ICD-10-CM

## 2019-02-25 RX ORDER — GABAPENTIN 100 MG/1
CAPSULE ORAL
Qty: 270 CAP | Refills: 0 | Status: SHIPPED | OUTPATIENT
Start: 2019-02-25 | End: 2019-05-13

## 2019-02-25 NOTE — TELEPHONE ENCOUNTER
Requested Prescriptions     Pending Prescriptions Disp Refills   • gabapentin (NEURONTIN) 100 MG Cap [Pharmacy Med Name: GABAPENTIN  100MG  CAP] 270 Cap 0     Sig: TAKE 1 CAPSULE BY MOUTH 3  TIMES A DAY   • metFORMIN (GLUCOPHAGE) 500 MG Tab [Pharmacy Med Name: MetFORMIN 500MG TABLET] 180 Tab 0     Sig: TAKE 1 TABLET BY MOUTH TWO  TIMES DAILY WITH MEALS       DANIEL Gallegos.

## 2019-02-26 DIAGNOSIS — E11.9 TYPE 2 DIABETES MELLITUS WITHOUT COMPLICATION, WITHOUT LONG-TERM CURRENT USE OF INSULIN (HCC): Chronic | ICD-10-CM

## 2019-03-13 DIAGNOSIS — E11.9 TYPE 2 DIABETES MELLITUS WITHOUT COMPLICATION, WITHOUT LONG-TERM CURRENT USE OF INSULIN (HCC): Chronic | ICD-10-CM

## 2019-03-13 NOTE — TELEPHONE ENCOUNTER
Was the patient seen in the last year in this department? Yes    Does patient have an active prescription for medications requested? Yes    Received Request Via: Pharmacy Patient is a non insulin depend  So Medicare only will cover 1 times a day  For monitoring If Pt was on Insulin then they will cover TID.  Please send this Rx to [facility]

## 2019-03-13 NOTE — TELEPHONE ENCOUNTER
Requested Prescriptions     Pending Prescriptions Disp Refills   • Blood Glucose Test Strips 100 Strip 3     Sig: Test strips order: Test strips for [employer] Next meter. Sig: use once per day #100 RF x 3       DANIEL Gallegos.

## 2019-05-07 ENCOUNTER — HOSPITAL ENCOUNTER (OUTPATIENT)
Dept: LAB | Facility: MEDICAL CENTER | Age: 54
End: 2019-05-07
Attending: NURSE PRACTITIONER
Payer: MEDICARE

## 2019-05-07 DIAGNOSIS — I10 ESSENTIAL HYPERTENSION: ICD-10-CM

## 2019-05-07 DIAGNOSIS — R73.01 ELEVATED FASTING GLUCOSE: ICD-10-CM

## 2019-05-07 DIAGNOSIS — E03.9 ACQUIRED HYPOTHYROIDISM: ICD-10-CM

## 2019-05-07 LAB
ALBUMIN SERPL BCP-MCNC: 4.6 G/DL (ref 3.2–4.9)
ALBUMIN/GLOB SERPL: 2.3 G/DL
ALP SERPL-CCNC: 82 U/L (ref 30–99)
ALT SERPL-CCNC: 39 U/L (ref 2–50)
ANION GAP SERPL CALC-SCNC: 8 MMOL/L (ref 0–11.9)
AST SERPL-CCNC: 23 U/L (ref 12–45)
BILIRUB SERPL-MCNC: 0.7 MG/DL (ref 0.1–1.5)
BUN SERPL-MCNC: 9 MG/DL (ref 8–22)
CALCIUM SERPL-MCNC: 9.5 MG/DL (ref 8.5–10.5)
CHLORIDE SERPL-SCNC: 102 MMOL/L (ref 96–112)
CO2 SERPL-SCNC: 29 MMOL/L (ref 20–33)
CREAT SERPL-MCNC: 1.05 MG/DL (ref 0.5–1.4)
GLOBULIN SER CALC-MCNC: 2 G/DL (ref 1.9–3.5)
GLUCOSE SERPL-MCNC: 134 MG/DL (ref 65–99)
POTASSIUM SERPL-SCNC: 4.4 MMOL/L (ref 3.6–5.5)
PROT SERPL-MCNC: 6.6 G/DL (ref 6–8.2)
SODIUM SERPL-SCNC: 139 MMOL/L (ref 135–145)
TSH SERPL DL<=0.005 MIU/L-ACNC: 0.88 UIU/ML (ref 0.38–5.33)

## 2019-05-07 PROCEDURE — 36415 COLL VENOUS BLD VENIPUNCTURE: CPT

## 2019-05-07 PROCEDURE — 84443 ASSAY THYROID STIM HORMONE: CPT

## 2019-05-07 PROCEDURE — 80053 COMPREHEN METABOLIC PANEL: CPT

## 2019-05-13 ENCOUNTER — HOSPITAL ENCOUNTER (OUTPATIENT)
Facility: MEDICAL CENTER | Age: 54
End: 2019-05-13
Attending: NURSE PRACTITIONER
Payer: MEDICARE

## 2019-05-13 ENCOUNTER — HOSPITAL ENCOUNTER (OUTPATIENT)
Dept: RADIOLOGY | Facility: MEDICAL CENTER | Age: 54
End: 2019-05-13
Attending: NURSE PRACTITIONER
Payer: MEDICARE

## 2019-05-13 ENCOUNTER — OFFICE VISIT (OUTPATIENT)
Dept: MEDICAL GROUP | Facility: PHYSICIAN GROUP | Age: 54
End: 2019-05-13
Payer: MEDICARE

## 2019-05-13 VITALS
SYSTOLIC BLOOD PRESSURE: 122 MMHG | DIASTOLIC BLOOD PRESSURE: 64 MMHG | WEIGHT: 179 LBS | OXYGEN SATURATION: 98 % | BODY MASS INDEX: 27.13 KG/M2 | RESPIRATION RATE: 17 BRPM | HEIGHT: 68 IN | HEART RATE: 116 BPM | TEMPERATURE: 97.9 F

## 2019-05-13 DIAGNOSIS — M79.641 RIGHT HAND PAIN: ICD-10-CM

## 2019-05-13 DIAGNOSIS — L60.8 DEFORMITY OF TOENAIL: ICD-10-CM

## 2019-05-13 DIAGNOSIS — Z12.31 ENCOUNTER FOR SCREENING MAMMOGRAM FOR BREAST CANCER: ICD-10-CM

## 2019-05-13 DIAGNOSIS — E11.9 TYPE 2 DIABETES MELLITUS WITHOUT COMPLICATION, WITHOUT LONG-TERM CURRENT USE OF INSULIN (HCC): ICD-10-CM

## 2019-05-13 DIAGNOSIS — I10 ESSENTIAL HYPERTENSION: Chronic | ICD-10-CM

## 2019-05-13 LAB
CREAT UR-MCNC: 227.1 MG/DL
HBA1C MFR BLD: 6.6 % (ref 0–5.6)
INT CON NEG: ABNORMAL
INT CON POS: ABNORMAL
MICROALBUMIN UR-MCNC: 0.8 MG/DL
MICROALBUMIN/CREAT UR: 4 MG/G (ref 0–30)

## 2019-05-13 PROCEDURE — 82570 ASSAY OF URINE CREATININE: CPT

## 2019-05-13 PROCEDURE — 83036 HEMOGLOBIN GLYCOSYLATED A1C: CPT | Performed by: NURSE PRACTITIONER

## 2019-05-13 PROCEDURE — 92250 FUNDUS PHOTOGRAPHY W/I&R: CPT | Mod: TC | Performed by: NURSE PRACTITIONER

## 2019-05-13 PROCEDURE — 73130 X-RAY EXAM OF HAND: CPT | Mod: RT

## 2019-05-13 PROCEDURE — 82043 UR ALBUMIN QUANTITATIVE: CPT

## 2019-05-13 PROCEDURE — 99214 OFFICE O/P EST MOD 30 MIN: CPT | Performed by: NURSE PRACTITIONER

## 2019-05-13 RX ORDER — FLUOXETINE 10 MG/1
20 CAPSULE ORAL DAILY
COMMUNITY
Start: 2019-03-01 | End: 2019-12-26

## 2019-05-13 RX ORDER — LISINOPRIL 5 MG/1
5 TABLET ORAL DAILY
Qty: 90 TAB | Refills: 3 | Status: SHIPPED | OUTPATIENT
Start: 2019-05-13 | End: 2019-07-29

## 2019-05-13 NOTE — ASSESSMENT & PLAN NOTE
This is now a chronic problem for the patient that is ongoing.  The patient reports that she was carrying a puppy on March 20, 2019 and tripped and fell forward landing on her right hand/thumb area.  Patient reports that she had instant pain and swelling in her right thumb.  Reports that everything she does with her hand makes the pain worse and nothing makes it better.  The patient is requesting imaging.

## 2019-05-13 NOTE — ASSESSMENT & PLAN NOTE
This is a chronic problem for the patient that is controlled. The patient's most recent lab work was completed on 5/13/2019, with a hemoglobin A1c 6.6%.  Current medications:  Is currently on Metformin 500 mg/day. Was having GI side effects on higher dose.  ACEI/ARB: /64 today. Has been normotensive. Denies any chest pain, sob, leg swelling. Is currently on Lisinopril 5 mg/day.  Aspirin: Does not takes 81mg daily.  Statin: , last checked 12/28/2019.  Is currently on atorvastatin 20 mg/day. No myalgias.  Tobacco: former-smoker.  Last dilated retinal eye exam: 5/13/2019. No vision changes.  Diabetes monofilament lower extremity exam: 5/13/2019.  Last Micro Albumin Creatine Ratio Urine: 5/13/2019. No urinary symptoms.  A1C goal: <7.0%

## 2019-05-13 NOTE — PROGRESS NOTES
"RN-CDE Note    Subjective:     Health changes since last visit/interval Hx: None    Medications (including changes made today)  Current Outpatient Prescriptions   Medication Sig Dispense Refill   • Blood Glucose Test Strips Test strips order: Test strips for George Contour Next meter. Sig: use once per day #100 RF x 3 100 Strip 3   • amphetamine-dextroamphetamine (ADDERALL, 10MG,) 10 MG Tab Take 10 mg by mouth 2 times a day.     • atorvastatin (LIPITOR) 20 MG Tab Take 1 Tab by mouth every bedtime. 90 Tab 3   • lamotrigine (LAMICTAL) 150 MG tablet Take 1 Tab by mouth every day. 90 Tab 0   • omeprazole (PRILOSEC) 40 MG delayed-release capsule TAKE 1 CAPSULE BY MOUTH EVERY DAY 90 Cap 0   • traZODone (DESYREL) 100 MG Tab TAKE 1/2 TO 1 TABLET BY  MOUTH AT BEDTIME AS NEEDED  FOR SLEEP 90 Tab 0   • buPROPion (WELLBUTRIN XL) 300 MG XL tablet TAKE 1 TABLET BY MOUTH  EVERY MORNING 90 Tab 0   • lisinopril (PRINIVIL) 10 MG Tab TAKE 1 TABLET BY MOUTH ONCE DAILY (Patient taking differently: 5 mg. TAKE 1 TABLET BY MOUTH ONCE DAILY) 90 Tab 3   • levothyroxine (SYNTHROID) 100 MCG Tab Take 1 Tab by mouth every day. 90 Tab 3   • gabapentin (NEURONTIN) 100 MG Cap TAKE 1 CAPSULE BY MOUTH 3  TIMES A DAY (Patient not taking: Reported on 5/13/2019) 270 Cap 0   • metFORMIN (GLUCOPHAGE) 500 MG Tab TAKE 1 TABLET BY MOUTH TWO  TIMES DAILY WITH MEALS (Patient not taking: Reported on 5/13/2019) 180 Tab 0   • sumatriptan (IMITREX) 100 MG tablet Take 100 mg by mouth Once PRN.       No current facility-administered medications for this visit.        Taking daily ASA: No  Taking above medications as prescribed: yes  SIDE EFFECTS: Patient denies side effects to medications    Exercise: minimal exercise, one hour walking weekly  Diet: \"healthy\" diet  in general  Patient's body mass index is 27.22 kg/m². Exercise and nutrition counseling were performed at this visit.  Vitals:    05/13/19 1521   BP: 122/64   Pulse: (!) 116   Resp: 17   Temp: 36.6 °C " (97.9 °F)   SpO2: 98%         Health Maintenance:   Health Maintenance Due   Topic Date Due   • Annual Wellness Visit  1965   • DIABETES MONOFILAMENT / LE EXAM  03/14/1964   • IMM DTaP/Tdap/Td Vaccine (1 - Tdap) 09/14/1982   • IMM ZOSTER VACCINES (1 of 2) 09/14/2013   • RETINAL SCREENING  11/08/2018   • URINE ACR / MICROALBUMIN  03/26/2019   • MAMMOGRAM  04/16/2019       Immunizations:   PPSV23: Up-to-date  [id number]: Up-to-date  Tdap: Up-to-date  Flu: Up-to-date  Hep B: Due    DM:   Last A1c:   Lab Results   Component Value Date/Time    HBA1C 6.6 (A) 05/13/2019 03:49 PM      A1C GOAL: < 7    Glucose monitoring frequency: daily  hasnt tested in awhile  Hypoglycemic episodes: no    Last Retinal Exam: schedule  Daily Foot Exam: Yes   Routine Dental Exams: Yes    Lab Results   Component Value Date/Time    MALBCRT 5 03/26/2018 10:16 AM    MICROALBUR 1.1 03/26/2018 10:16 AM        ACR Albumin/Creatinine Ratio goal <30     HTN:   Blood pressure goal <140/<80 .   Currently Rx ACE/ARB: Yes    Dyslipidemia:    Lab Results   Component Value Date/Time    CHOLSTRLTOT 243 (H) 12/28/2018 11:46 AM     (H) 12/28/2018 11:46 AM    HDL 48 12/28/2018 11:46 AM    TRIGLYCERIDE 203 (H) 12/28/2018 11:46 AM       Lab Results   Component Value Date/Time    SODIUM 139 05/07/2019 09:58 AM    POTASSIUM 4.4 05/07/2019 09:58 AM    CHLORIDE 102 05/07/2019 09:58 AM    CO2 29 05/07/2019 09:58 AM    GLUCOSE 134 (H) 05/07/2019 09:58 AM    BUN 9 05/07/2019 09:58 AM    CREATININE 1.05 05/07/2019 09:58 AM     Lab Results   Component Value Date/Time    ALKPHOSPHAT 82 05/07/2019 09:58 AM    ASTSGOT 23 05/07/2019 09:58 AM    ALTSGPT 39 05/07/2019 09:58 AM    TBILIRUBIN 0.7 05/07/2019 09:58 AM        Currently Rx Statin: Yes    She  reports that she quit smoking about 2 years ago. Her smoking use included Cigarettes. She has a 35.00 pack-year smoking history. She has never used smokeless tobacco.    Objective:     Exam:  Monofilament: done    Monofilament testing with a 10 gram force: sensation intact: decreased bilaterally  Visual Inspection: Feet without maceration, ulcers, fissures.  Pedal pulses: intact bilaterally    Plan:     Discussed and educated on:   - All medications, side effects and compliance (discussed carefully)  - Annual eye examinations at Ophthalmology  - Diabetic Meal Plan: foods that contain carbs  - Home glucose monitoring emphasized  - Weight control and daily exercise    Recommended medication changes: decrease metformin to 500mg a day and decrease carb intake increase exercise.

## 2019-05-14 NOTE — ASSESSMENT & PLAN NOTE
This is a new problem for the patient that has been progressing over the last couple of months.  The patient is noting that bilateral great toes toenails are lifting in the corners.  It does not appear to be a fungal infection.  Patient states that there is no pain unless she pushes on the nail.

## 2019-05-14 NOTE — ASSESSMENT & PLAN NOTE
This is a chronic problem for the patient that is controlled.  The patient's blood pressure today is 122/64.  The patient's heart rate is elevated at 116.  The patient was previously taking lisinopril 10 mg/day up until the last few weeks.  The patient was seen by psychiatry and mentioned that she was having fatigue, the patient's blood pressure was 120/50 at that time and it was advised by the psychiatry provider that she cut her lisinopril dose in half, which she has done.  Patient does not notice any significant improvement in her fatigue with the decrease in dose.  The patient denies chest pain, shortness of breath, headaches, dizziness, blurry vision, or dyspnea on exertion.

## 2019-05-14 NOTE — PROGRESS NOTES
CC:   Chief Complaint   Patient presents with   • Diabetes Mellitus       HISTORY OF THE PRESENT ILLNESS: Patient is a 53 y.o. female. This pleasant patient is here today to follow-up on diabetes plus additional complaints as listed below.    Health Maintenance: Completed    Type 2 diabetes mellitus without complication, without long-term current use of insulin (HCC)  This is a chronic problem for the patient that is controlled. The patient's most recent lab work was completed on 5/13/2019, with a hemoglobin A1c 6.6%.  Current medications:  Is currently on Metformin 500 mg/day. Was having GI side effects on higher dose.  ACEI/ARB: /64 today. Has been normotensive. Denies any chest pain, sob, leg swelling. Is currently on Lisinopril 5 mg/day.  Aspirin: Does not takes 81mg daily.  Statin: , last checked 12/28/2019.  Is currently on atorvastatin 20 mg/day. No myalgias.  Tobacco: former-smoker.  Last dilated retinal eye exam: 5/13/2019. No vision changes.  Diabetes monofilament lower extremity exam: 5/13/2019.  Last Micro Albumin Creatine Ratio Urine: 5/13/2019. No urinary symptoms.  A1C goal: <7.0%    Right hand pain  This is now a chronic problem for the patient that is ongoing.  The patient reports that she was carrying a puppy on March 20, 2019 and tripped and fell forward landing on her right hand/thumb area.  Patient reports that she had instant pain and swelling in her right thumb.  Reports that everything she does with her hand makes the pain worse and nothing makes it better.  The patient is requesting imaging.    Essential hypertension  This is a chronic problem for the patient that is controlled.  The patient's blood pressure today is 122/64.  The patient's heart rate is elevated at 116.  The patient was previously taking lisinopril 10 mg/day up until the last few weeks.  The patient was seen by psychiatry and mentioned that she was having fatigue, the patient's blood pressure was 120/50 at that  time and it was advised by the psychiatry provider that she cut her lisinopril dose in half, which she has done.  Patient does not notice any significant improvement in her fatigue with the decrease in dose.  The patient denies chest pain, shortness of breath, headaches, dizziness, blurry vision, or dyspnea on exertion.    Deformity of toenail  This is a new problem for the patient that has been progressing over the last couple of months.  The patient is noting that bilateral great toes toenails are lifting in the corners.  It does not appear to be a fungal infection.  Patient states that there is no pain unless she pushes on the nail.    Allergies: Patient has no known allergies.    Current Outpatient Prescriptions Ordered in [facility]   Medication Sig Dispense Refill   • lisinopril (PRINIVIL) 5 MG Tab Take 1 Tab by mouth every day. 90 Tab 3   • Blood Glucose Test Strips Test strips order: Test strips for George Contour Next meter. Sig: use once per day #100 RF x 3 100 Strip 3   • amphetamine-dextroamphetamine (ADDERALL, 10MG,) 10 MG Tab Take 10 mg by mouth 2 times a day.     • atorvastatin (LIPITOR) 20 MG Tab Take 1 Tab by mouth every bedtime. 90 Tab 3   • lamotrigine (LAMICTAL) 150 MG tablet Take 1 Tab by mouth every day. 90 Tab 0   • omeprazole (PRILOSEC) 40 MG delayed-release capsule TAKE 1 CAPSULE BY MOUTH EVERY DAY 90 Cap 0   • traZODone (DESYREL) 100 MG Tab TAKE 1/2 TO 1 TABLET BY  MOUTH AT BEDTIME AS NEEDED  FOR SLEEP 90 Tab 0   • buPROPion (WELLBUTRIN XL) 300 MG XL tablet TAKE 1 TABLET BY MOUTH  EVERY MORNING 90 Tab 0   • levothyroxine (SYNTHROID) 100 MCG Tab Take 1 Tab by mouth every day. 90 Tab 3   • FLUoxetine (PROZAC) 10 MG Cap Take 20 mg by mouth every day.     • metFORMIN (GLUCOPHAGE) 500 MG Tab TAKE 1 TABLET BY MOUTH TWO  TIMES DAILY WITH MEALS (Patient not taking: Reported on 5/13/2019) 180 Tab 0   • sumatriptan (IMITREX) 100 MG tablet Take 100 mg by mouth Once PRN.       No current Epic-ordered  facility-administered medications on file.        Past Medical History:   Diagnosis Date   • ADD (attention deficit disorder)    • Bipolar 1 disorder (HCC)    • Bipolar disorder (HCC)    • Cervical cancer (HCC) 1984   • Chronic back pain    • Depression    • Diabetes (HCC)    • Insomnia    • Memory loss due to medical condition     Related to ECT therapy for depression   • Migraine    • Thyroid disease     hypo       Past Surgical History:   Procedure Laterality Date   • PB ENLARGE BREAST WITH IMPLANT  2004   • ABDOMINAL HYSTERECTOMY TOTAL  2004    total   • PRIMARY C SECTION     • TOE ARTHROPLASTY Right     cut tendons, fused joints. last surgery removed last joint of all toes except for big toe       Social History   Substance Use Topics   • Smoking status: Former Smoker     Packs/day: 1.00     Years: 35.00     Types: Cigarettes     Quit date: 8/23/2016   • Smokeless tobacco: Never Used   • Alcohol use Yes      Comment: holidays       Social History     Social History Narrative   • No narrative on file       Family History   Problem Relation Age of Onset   • Bipolar disorder Father    • Alcohol abuse Father         ETOH   • Other Father         Cirrhosis   • Diabetes Mother    • Hypertension Mother    • Other Mother         gilberts syndrome   • Alcohol abuse Sister    • Psychiatry Sister    • Alcohol abuse Maternal Grandmother    • Other Maternal Grandmother         Brain Tumor   • Diabetes Maternal Grandfather    • Alzheimer's Disease Maternal Grandfather    • Cancer Maternal Grandfather         Skin   • Cancer Maternal Aunt         stomach   • Diabetes Maternal Uncle    • Alcohol abuse Maternal Uncle    • Stroke Maternal Uncle    • Diabetes Sister    • Hyperlipidemia Sister    • Anxiety disorder Sister    • Other Sister         benign uterine tumor   • Obesity Sister    • Obesity Sister    • Alcohol/Drug Maternal Uncle         prescription narcotics   • Cancer Maternal Uncle      ROS:   Constitutional:   "Negative for fever, chills, unexpected weight change, night sweats, body aches, sleep issues, and fatigue/generalized weakness.   Respiratory:  Negative for cough, shortness of breath, sputum production, hemoptysis, chest congestion, dyspnea, wheezing, and crackles.    Cardiovascular:  Negative for chest pain, palpitations, TESFAYE, paroxsymal nocturnal dyspnea, orthopnea, and bilateral lower extremity edema.   Gastrointestinal:  Negative for heartburn, nausea, vomiting, abdominal pain, hematochezia, melena, diarrhea, constipation, and greasy/foul-smelling stools.   Musculoskeletal: Positive for right hand thumb pain post fall in March 2019. Negative for myalgias, back pain.   Skin: positive for toenail deformities, bilateral great toes. Negative for rash, sores, lumps, itching, cyanotic skin color change.   Neurological: Positive for peripheral neuropathy. Negative for dizziness, tremors, focal sensory deficit, focal weakness and headaches.   Endo/Heme/Allergies:  Does not bruise/bleed easily. Denies cold/heat intolerance.   Psychiatric/Behavioral: Positive for depression and anxiety, negative for suicidal/homicidal ideation and memory loss.        Exam: /64 (BP Location: Left arm, Patient Position: Sitting, BP Cuff Size: Adult)   Pulse (!) 116   Temp 36.6 °C (97.9 °F) (Temporal)   Resp 17   Ht 1.727 m (5' 8\")   Wt 81.2 kg (179 lb)   SpO2 98%  Body mass index is 27.22 kg/m².    General:  Normal appearing. No distress.  HEENT:  Normocephalic. Eyes conjunctiva clear lids without ptosis, pupils equal and reactive to light accommodation, ears normal shape and contour.  Neck:  Supple without JVD or bruit.  Pulmonary:  Clear to ausculation.  Normal effort. No rales, ronchi, or wheezing.  Cardiovascular:  Regular rate and rhythm without murmur. Carotid and radial pulses are intact and equal bilaterally.  Abdomen:  Soft, nontender, nondistended. Normal bowel sounds.  Neurologic:  Grossly nonfocal.  Skin: Positive " for toenail deformities, bilateral great toes. Warm and dry.  No obvious lesions.  Musculoskeletal: Reported pain in right hand thumb with movement and palpation, no swelling or bruising noted, full ROM. Normal gait. No extremity cyanosis, clubbing, or edema.  Psych:  Normal mood and affect. Alert and oriented x3. Judgment and insight is normal.    Assessment/Plan:  1. Type 2 diabetes mellitus without complication, without long-term current use of insulin ([facility])  POCT Hemoglobin A1c - 6.6    Diabetic Monofilament LE Exam - completed in clinic today.    POCT Retinal Eye Exam- completed in clinic today.    MICROALBUMIN CREAT RATIO URINE (CLINIC COLLECT)- completed in clinic today.   2. Essential hypertension  lisinopril (PRINIVIL) 5 MG Tab   3. Right hand pain  DX-HAND 3+ RIGHT   4. Deformity of toenail  REFERRAL TO PODIATRY   5. Encounter for screening mammogram for breast cancer  MA-SCREENING MAMMO BILAT W/TOMOSYNTHESIS W/CAD     Educated in proper administration of medication(s) ordered today including safety, possible SE, risks, benefits, rationale and alternatives to therapy.   Supportive care, differential diagnoses, and indications for immediate follow-up discussed with patient.    Pathogenesis of diagnosis discussed including typical length and natural progression.    Instructed to return to clinic or nearest emergency department for any change in condition, further concerns, or worsening of symptoms.  Patient states understanding of the plan of care and discharge instructions.    Return in about 6 months (around 11/13/2019) for Diabetes. Patient will follow up with Diabetes RN in 2 weeks then 3 months.    I have placed the below orders and discussed them with an approved delegating provider. The MA is performing the below orders under the direction of Dr. Guerra.    Please note that this dictation was created using voice recognition software. I have made every reasonable attempt to correct obvious errors, but I  expect that there are errors of grammar and possibly content that I did not discover before finalizing the note.

## 2019-05-21 LAB — RETINAL SCREEN: NEGATIVE

## 2019-06-18 ENCOUNTER — APPOINTMENT (OUTPATIENT)
Dept: RADIOLOGY | Facility: MEDICAL CENTER | Age: 54
End: 2019-06-18
Attending: NURSE PRACTITIONER
Payer: MEDICARE

## 2019-06-24 DIAGNOSIS — E11.9 TYPE 2 DIABETES MELLITUS WITHOUT COMPLICATION, WITHOUT LONG-TERM CURRENT USE OF INSULIN (HCC): ICD-10-CM

## 2019-06-24 DIAGNOSIS — E11.9 TYPE 2 DIABETES MELLITUS WITHOUT COMPLICATION, WITHOUT LONG-TERM CURRENT USE OF INSULIN (HCC): Chronic | ICD-10-CM

## 2019-06-24 RX ORDER — METFORMIN HYDROCHLORIDE 500 MG/1
500 TABLET, EXTENDED RELEASE ORAL DAILY
Qty: 90 TAB | Refills: 1 | Status: SHIPPED | OUTPATIENT
Start: 2019-06-24 | End: 2019-07-29

## 2019-06-24 RX ORDER — OMEPRAZOLE 40 MG/1
40 CAPSULE, DELAYED RELEASE ORAL DAILY
Qty: 90 CAP | Refills: 3 | Status: SHIPPED | OUTPATIENT
Start: 2019-06-24 | End: 2020-04-30

## 2019-06-24 NOTE — TELEPHONE ENCOUNTER
Requested Prescriptions     Pending Prescriptions Disp Refills   • omeprazole (PRILOSEC) 40 MG delayed-release capsule [Pharmacy Med Name: OMEPRAZOLE  40MG  CAP] 90 Cap 3     Sig: Take 1 Cap by mouth every day.       DANIEL Gallegos.

## 2019-06-24 NOTE — TELEPHONE ENCOUNTER
Was the patient seen in the last year in this department? Yes LOV 05/13/19    Does patient have an active prescription for medications requested? No     Received Request Via: Pharmacy

## 2019-06-24 NOTE — TELEPHONE ENCOUNTER
Was the patient seen in the last year in this department? Yes 5/3/19    Does patient have an active prescription for medications requested? No     Received Request Via: Pharmacy

## 2019-06-25 NOTE — PROGRESS NOTES
1. Type 2 diabetes mellitus without complication, without long-term current use of insulin (HCC)  Patient having GI side effects with normal metformin.   - metFORMIN ER (GLUCOPHAGE XR) 500 MG TABLET SR 24 HR; Take 1 Tab by mouth every day.  Dispense: 90 Tab; Refill: 1

## 2019-07-28 RX ORDER — DAPAGLIFLOZIN 5 MG/1
5 TABLET, FILM COATED ORAL EVERY MORNING
Qty: 90 TAB | Refills: 0 | Status: CANCELLED | OUTPATIENT
Start: 2019-07-28

## 2019-07-29 ENCOUNTER — OFFICE VISIT (OUTPATIENT)
Dept: MEDICAL GROUP | Facility: PHYSICIAN GROUP | Age: 54
End: 2019-07-29
Payer: MEDICARE

## 2019-07-29 VITALS
BODY MASS INDEX: 26.52 KG/M2 | DIASTOLIC BLOOD PRESSURE: 72 MMHG | SYSTOLIC BLOOD PRESSURE: 128 MMHG | WEIGHT: 175 LBS | TEMPERATURE: 98 F | HEIGHT: 68 IN | HEART RATE: 91 BPM | OXYGEN SATURATION: 94 %

## 2019-07-29 DIAGNOSIS — E11.9 TYPE 2 DIABETES MELLITUS WITHOUT COMPLICATION, WITHOUT LONG-TERM CURRENT USE OF INSULIN (HCC): Chronic | ICD-10-CM

## 2019-07-29 DIAGNOSIS — D17.1 LIPOMA OF TORSO: ICD-10-CM

## 2019-07-29 DIAGNOSIS — I10 ESSENTIAL HYPERTENSION: Chronic | ICD-10-CM

## 2019-07-29 PROCEDURE — 99213 OFFICE O/P EST LOW 20 MIN: CPT | Performed by: NURSE PRACTITIONER

## 2019-07-29 RX ORDER — SUMATRIPTAN 100 MG/1
100 TABLET, FILM COATED ORAL
Qty: 10 TAB | Refills: 11 | Status: SHIPPED | OUTPATIENT
Start: 2019-07-29 | End: 2022-12-22 | Stop reason: SDUPTHER

## 2019-07-29 RX ORDER — LAMOTRIGINE 200 MG/1
200 TABLET ORAL DAILY
COMMUNITY
Start: 2019-07-17

## 2019-07-29 RX ORDER — BUPROPION HYDROCHLORIDE 150 MG/1
450 TABLET ORAL EVERY MORNING
COMMUNITY
End: 2020-01-14

## 2019-07-29 NOTE — PROGRESS NOTES
CC:   Chief Complaint   Patient presents with   • Medication Management     Metformin reaction, Lisinopril reaction Coughing, back lump   • Diabetes     HISTORY OF THE PRESENT ILLNESS: Patient is a 53 y.o. female. This pleasant patient is here today to diabetes and hypertension medication intolerance and a lipoma on her left side.    Health Maintenance: Completed.  Mammogram scheduled 8/12/2019.    Essential hypertension  This is an ongoing problem for the patient that is stable.  The patient was previously on lisinopril 5 mg/day, but was experiencing a persistent dry cough.  The patient stopped taking lisinopril on 7/19/2019 and reports that this cough has resolved.  Her blood pressure when checked at home is consistent with today's reading of 128/72.  Vitals 8/15/2018 1/2/2019 5/13/2019 7/29/2019   SYSTOLIC 118 124 122 128   DIASTOLIC 80 78 64 72   PULSE 58 90 116 91       Type 2 diabetes mellitus without complication, without long-term current use of insulin (HCC)  This is an ongoing problem for the patient that is controlled.  The patient's last hemoglobin A1c was 6.6% on 5/13/2019.  The patient was prescribed metformin 500 mg/day, but was having intolerable GI side effects.  Her medication was changed to metformin  mg/day, but the patient continued to have intolerable GI side effects.  Patient is here to discuss medication options for this problem.    Lipoma of torso  This is a new problem for the patient that is been going on for a few months.  Patient reports that she has a lump on her left side near her rib cage.  The patient had her friend look at it, who is a nurse, who told her that it was a lipoma.  The patient is concerned because the lump is painful, stating that it feels like a bruise.  She is interested in a referral to have it removed.  She does have a history of trauma to her left side ribs in the past, but she first noticed this lump about 4 to 5 months ago.    Allergies: Patient has no  known allergies.    Current Outpatient Prescriptions Ordered in [facility]   Medication Sig Dispense Refill   • Empagliflozin (JARDIANCE) 10 MG Tab Take 10 mg by mouth every morning. 90 Tab 1   • buPROPion (WELLBUTRIN XL) 150 MG XL tablet Take 450 mg by mouth every morning.     • sumatriptan (IMITREX) 100 MG tablet Take 1 Tab by mouth Once PRN. 10 Tab 11   • omeprazole (PRILOSEC) 40 MG delayed-release capsule Take 1 Cap by mouth every day. 90 Cap 3   • FLUoxetine (PROZAC) 10 MG Cap Take 20 mg by mouth every day.     • amphetamine-dextroamphetamine (ADDERALL, 10MG,) 10 MG Tab Take 10 mg by mouth 2 times a day.     • atorvastatin (LIPITOR) 20 MG Tab Take 1 Tab by mouth every bedtime. 90 Tab 3   • traZODone (DESYREL) 100 MG Tab TAKE 1/2 TO 1 TABLET BY  MOUTH AT BEDTIME AS NEEDED  FOR SLEEP 90 Tab 0   • levothyroxine (SYNTHROID) 100 MCG Tab Take 1 Tab by mouth every day. 90 Tab 3   • lamotrigine (LAMICTAL) 200 MG tablet Take 200 mg by mouth every day.     • Blood Glucose Test Strips Test strips order: Test strips for [employer] Cont[employer] Next meter. Sig: use once per day #100 RF x 3 100 Strip 3     No current Epic-ordered facility-administered medications on file.      Past Medical History:   Diagnosis Date   • ADD (attention deficit disorder)    • Bipolar 1 disorder (HCC)    • Bipolar disorder (HCC)    • Cervical cancer (HCC) 1984   • Chronic back pain    • Depression    • Diabetes (HCC)    • Insomnia    • Memory loss due to medical condition     Related to ECT therapy for depression   • Migraine    • Thyroid disease     hypo     Past Surgical History:   Procedure Laterality Date   • PB ENLARGE BREAST WITH IMPLANT  2004   • ABDOMINAL HYSTERECTOMY TOTAL  2004    total   • PRIMARY C SECTION     • TOE ARTHROPLASTY Right     cut tendons, fused joints. last surgery removed last joint of all toes except for big toe     Social History   Substance Use Topics   • Smoking status: Former Smoker     Packs/day: 1.00     Years: 35.00     Types:  Cigarettes     Quit date: 8/23/2016   • Smokeless tobacco: Never Used   • Alcohol use Yes      Comment: holidays     Social History     Social History Narrative   • No narrative on file     Family History   Problem Relation Age of Onset   • Bipolar disorder Father    • Alcohol abuse Father         ETOH   • Other Father         Cirrhosis   • Diabetes Mother    • Hypertension Mother    • Other Mother         gilberts syndrome   • Alcohol abuse Sister    • Psychiatry Sister    • Alcohol abuse Maternal Grandmother    • Other Maternal Grandmother         Brain Tumor   • Diabetes Maternal Grandfather    • Alzheimer's Disease Maternal Grandfather    • Cancer Maternal Grandfather         Skin   • Cancer Maternal Aunt         stomach   • Diabetes Maternal Uncle    • Alcohol abuse Maternal Uncle    • Stroke Maternal Uncle    • Diabetes Sister    • Hyperlipidemia Sister    • Anxiety disorder Sister    • Other Sister         benign uterine tumor   • Obesity Sister    • Obesity Sister    • Alcohol/Drug Maternal Uncle         prescription narcotics   • Cancer Maternal Uncle      ROS:   Constitutional:  Negative for fever, chills, unexpected weight change, night sweats, body aches, sleep issues, and fatigue/generalized weakness.   Respiratory: Positive for persistent dry cough with lisinopril. Negative for shortness of breath, sputum production, hemoptysis, chest congestion, dyspnea, wheezing, and crackles.    Cardiovascular:  Negative for chest pain, palpitations, TESFAYE, paroxsymal nocturnal dyspnea, orthopnea, and bilateral lower extremity edema.   Gastrointestinal: Positive for diarrhea with metformin. Negative for heartburn, nausea, vomiting, abdominal pain, hematochezia, melena, diarrhea, constipation, and greasy/foul-smelling stools.   Skin: Positive for lump on left side torso. Negative for rash, sores, lumps, itching, cyanotic skin color change.   Psychiatric/Behavioral: Positive for depression and anxiety, negative for  "suicidal/homicidal ideation and memory loss.        Exam: /72 (BP Location: Left arm, Patient Position: Sitting, BP Cuff Size: Adult)   Pulse 91   Temp 36.7 °C (98 °F) (Temporal)   Ht 1.727 m (5' 8\")   Wt 79.4 kg (175 lb)   SpO2 94%  Body mass index is 26.61 kg/m².    GENERAL: No acute distress  HENT: Atraumatic, normocephalic  EYES: Extraocular movements intact, pupils equal and reactive to light  NECK: Supple, FROM  CHEST: No deformities, Equal chest expansion  RESP: Unlabored, no stridor or audible wheeze  ABD: Non-Distended  Extremities: No Clubbing, Cyanosis, or Edema  Skin: left side mobile, tender, semi firm lump consistent with lipoma near bottom of rib cage. Warm/dry, without rashes  Neuro: A/O x 4, CN 2-12 Grossly intact, Motor/sensory grossly intact  Psych: Normal behavior, normal affect    Assessment/Plan:  1. Type 2 diabetes mellitus without complication, without long-term current use of insulin (HCC)  Discontinue metformin.  Plan to start Jardiance 10 mg/day, notify me if you have any questions or side effects.  - Empagliflozin (JARDIANCE) 10 MG Tab; Take 10 mg by mouth every morning.  Dispense: 90 Tab; Refill: 1    2. Lipoma of torso  - REFERRAL TO GENERAL SURGERY    3. Essential hypertension  Discontinue lisinopril.  Continue checking blood pressures at home.  Starting Jardiance today, will review blood pressures at follow-up appointment and discuss continued antihypertensive treatment at that time.    Other orders  - sumatriptan (IMITREX) 100 MG tablet; Take 1 Tab by mouth Once PRN.  Dispense: 10 Tab; Refill: 11    Educated in proper administration of medication(s) ordered today including safety, possible SE, risks, benefits, rationale and alternatives to therapy.   Supportive care, differential diagnoses, and indications for immediate follow-up discussed with patient.    Pathogenesis of diagnosis discussed including typical length and natural progression.    Instructed to return to " clinic or nearest emergency department for any change in condition, further concerns, or worsening of symptoms.  Patient states understanding of the plan of care and discharge instructions.    Return in 4 months (on 11/13/2019) for Diabetes, A1C in clinic.    Please note that this dictation was created using voice recognition software. I have made every reasonable attempt to correct obvious errors, but I expect that there are errors of grammar and possibly content that I did not discover before finalizing the note.

## 2019-07-29 NOTE — ASSESSMENT & PLAN NOTE
This is an ongoing problem for the patient that is stable.  The patient was previously on lisinopril 5 mg/day, but was experiencing a persistent dry cough.  The patient stopped taking lisinopril on 7/19/2019 and reports that this cough has resolved.  Her blood pressure when checked at home is consistent with today's reading of 128/72.  Vitals 8/15/2018 1/2/2019 5/13/2019 7/29/2019   SYSTOLIC 118 124 122 128   DIASTOLIC 80 78 64 72   PULSE 58 90 116 91

## 2019-07-29 NOTE — ASSESSMENT & PLAN NOTE
This is an ongoing problem for the patient that is controlled.  The patient's last hemoglobin A1c was 6.6% on 5/13/2019.  The patient was prescribed metformin 500 mg/day, but was having intolerable GI side effects.  Her medication was changed to metformin  mg/day, but the patient continued to have intolerable GI side effects.  Patient is here to discuss medication options for this problem.

## 2019-07-29 NOTE — ASSESSMENT & PLAN NOTE
This is a new problem for the patient that is been going on for a few months.  Patient reports that she has a lump on her left side near her rib cage.  The patient had her friend look at it, who is a nurse, who told her that it was a lipoma.  The patient is concerned because the lump is painful, stating that it feels like a bruise.  She is interested in a referral to have it removed.  She does have a history of trauma to her left side ribs in the past, but she first noticed this lump about 4 to 5 months ago.

## 2019-08-28 DIAGNOSIS — E03.9 ACQUIRED HYPOTHYROIDISM: ICD-10-CM

## 2019-08-29 RX ORDER — LEVOTHYROXINE SODIUM 0.1 MG/1
100 TABLET ORAL
Qty: 90 TAB | Refills: 2 | Status: SHIPPED | OUTPATIENT
Start: 2019-08-29 | End: 2020-04-13

## 2019-08-29 NOTE — TELEPHONE ENCOUNTER
Was the patient seen in the last year in this department? Yes lov 7/29/19    Does patient have an active prescription for medications requested? No     Received Request Via: Pharmacy

## 2019-08-29 NOTE — TELEPHONE ENCOUNTER
Requested Prescriptions     Pending Prescriptions Disp Refills   • levothyroxine (SYNTHROID) 100 MCG Tab [Pharmacy Med Name: LEVOTHYROXINE  0.1MG  TAB] 90 Tab 2     Sig: Take 1 Tab by mouth Every morning on an empty stomach.       DANIEL Gallegos.

## 2019-09-13 ENCOUNTER — HOSPITAL ENCOUNTER (OUTPATIENT)
Dept: RADIOLOGY | Facility: MEDICAL CENTER | Age: 54
End: 2019-09-13
Attending: SURGERY
Payer: MEDICARE

## 2019-09-13 DIAGNOSIS — R10.9 FLANK PAIN: ICD-10-CM

## 2019-09-13 PROCEDURE — 76705 ECHO EXAM OF ABDOMEN: CPT

## 2019-11-13 DIAGNOSIS — E78.2 MIXED HYPERLIPIDEMIA: ICD-10-CM

## 2019-11-14 RX ORDER — ATORVASTATIN CALCIUM 20 MG/1
20 TABLET, FILM COATED ORAL DAILY
Qty: 90 TAB | Refills: 1 | Status: SHIPPED | OUTPATIENT
Start: 2019-11-14 | End: 2020-05-04

## 2019-11-14 NOTE — TELEPHONE ENCOUNTER
Was the patient seen in the last year in this department? Yes 7/29/19    Does patient have an active prescription for medications requested? No     Received Request Via: Pharmacy

## 2019-11-14 NOTE — TELEPHONE ENCOUNTER
Requested Prescriptions     Pending Prescriptions Disp Refills   • atorvastatin (LIPITOR) 20 MG Tab [Pharmacy Med Name: ATORVASTATIN  20MG  TAB] 90 Tab 1     Sig: Take 1 Tab by mouth every day.       DANIEL Gallegos.

## 2019-11-29 DIAGNOSIS — E11.9 TYPE 2 DIABETES MELLITUS WITHOUT COMPLICATION, WITHOUT LONG-TERM CURRENT USE OF INSULIN (HCC): Chronic | ICD-10-CM

## 2019-12-03 RX ORDER — EMPAGLIFLOZIN 10 MG/1
TABLET, FILM COATED ORAL
Qty: 90 TAB | Refills: 0 | Status: SHIPPED | OUTPATIENT
Start: 2019-12-03 | End: 2019-12-26

## 2019-12-26 ENCOUNTER — OFFICE VISIT (OUTPATIENT)
Dept: MEDICAL GROUP | Facility: PHYSICIAN GROUP | Age: 54
End: 2019-12-26
Payer: MEDICARE

## 2019-12-26 VITALS
DIASTOLIC BLOOD PRESSURE: 78 MMHG | BODY MASS INDEX: 28.19 KG/M2 | HEIGHT: 68 IN | SYSTOLIC BLOOD PRESSURE: 128 MMHG | WEIGHT: 186 LBS | OXYGEN SATURATION: 95 % | TEMPERATURE: 96.5 F | HEART RATE: 82 BPM

## 2019-12-26 DIAGNOSIS — Z87.891 HISTORY OF SMOKING AT LEAST 1 PACK PER DAY FOR AT LEAST 30 YEARS: ICD-10-CM

## 2019-12-26 DIAGNOSIS — E11.9 TYPE 2 DIABETES MELLITUS WITHOUT COMPLICATION, WITHOUT LONG-TERM CURRENT USE OF INSULIN (HCC): Chronic | ICD-10-CM

## 2019-12-26 DIAGNOSIS — E78.2 MIXED HYPERLIPIDEMIA: Chronic | ICD-10-CM

## 2019-12-26 DIAGNOSIS — E03.9 ACQUIRED HYPOTHYROIDISM: Chronic | ICD-10-CM

## 2019-12-26 DIAGNOSIS — Z23 NEED FOR VACCINATION: ICD-10-CM

## 2019-12-26 DIAGNOSIS — I10 ESSENTIAL HYPERTENSION: Chronic | ICD-10-CM

## 2019-12-26 DIAGNOSIS — R20.0 RIGHT LEG NUMBNESS: ICD-10-CM

## 2019-12-26 LAB
HBA1C MFR BLD: 7.1 % (ref 0–5.6)
INT CON NEG: ABNORMAL
INT CON POS: ABNORMAL

## 2019-12-26 PROCEDURE — 99214 OFFICE O/P EST MOD 30 MIN: CPT | Mod: 25 | Performed by: NURSE PRACTITIONER

## 2019-12-26 PROCEDURE — 90686 IIV4 VACC NO PRSV 0.5 ML IM: CPT | Performed by: NURSE PRACTITIONER

## 2019-12-26 PROCEDURE — G0008 ADMIN INFLUENZA VIRUS VAC: HCPCS | Performed by: NURSE PRACTITIONER

## 2019-12-26 PROCEDURE — 83036 HEMOGLOBIN GLYCOSYLATED A1C: CPT | Performed by: NURSE PRACTITIONER

## 2019-12-26 NOTE — ASSESSMENT & PLAN NOTE
"New problem to examiner.  Patient reports that approximately 2 weeks ago she had an experience with her right lower extremity that felt \"very much like neuropathy, but had no pain\".  Patient describes that her right thigh and knee \"felt funky\".  Patient states that she got off of her bed and her right leg did not work.  States that any weight put on her right leg would cause her right knee to buckle and she would fall to the ground.  Patient states that she took several days of rest, elevation, and over-the-counter ibuprofen and she has slowly been able to tolerate weight on her right lower extremity.  Patient states that symptoms have now plateaued, she is not 100%, and still feels like her right lower extremity is weak and heavy.  Patient denies any injury to her right lower extremity, denies having pain during the episode.  The patient does have a history of chronic low back pain since she was in her 20s, but states that her back has not been bothering her recently.  Patient reports that she looked up her symptoms online as she was concerned she may have had a stroke.  Reports that all of her symptoms \"behave like femoral neuropathy, except without pain\".  Patient denies any other symptoms.  "

## 2019-12-26 NOTE — ASSESSMENT & PLAN NOTE
Chronic problem for the patient that is ongoing.  The patient's last hemoglobin A1c was 6.6% in May 2019. The patient was previously prescribed metformin and metformin ER, but was unable to tolerate the medication due to intolerable GI side effects.  The patient was changed to Jardiance 10 mg/day.  The patient reports that she has had 0 side effects with Jardiance.  Patient is here today to repeat POCT A1c.

## 2019-12-27 NOTE — ASSESSMENT & PLAN NOTE
"Chronic, stable.    Not currently taking medication for this issue.  Patient was previously prescribed lisinopril, but experienced persistent dry cough.  Patient stopped taking lisinopril in July 2019 and the cough has resolved.  Blood pressure is stable without medications.  Reports diet is \" okay\".   She is not following a low-salt diet.  She is not exercising regularly.  She is not taking baby aspirin daily.   She is monitoring BP at home.   Denies symptoms low BP: light-headed, tunnel-vision, unusual fatigue, dizziness.  Denies symptoms high BP: pounding headache, visual changes, palpitations, flushed face.   Denies chest pain, shortness of breath, dyspnea on exertion.   Denies medicine side effects: unusual fatigue, slow heartbeat, foot/leg swelling, cough.  Vitals 1/2/2019 5/13/2019 7/29/2019 12/26/2019   SYSTOLIC 124 122 128 128   DIASTOLIC 78 64 72 78   PULSE 90 116 91 82     "

## 2019-12-27 NOTE — ASSESSMENT & PLAN NOTE
This is a chronic problem for the patient that is ongoing.  The patient reports that she has a history of smoking at least 1 pack/day for 35+ years. Patient previously referred to the lung cancer screening program, but did not schedule.  Requesting a new referral.  Reviewed requirements for the referral and patient qualifies.

## 2019-12-27 NOTE — ASSESSMENT & PLAN NOTE
"Chronic, uncontrolled.  Currently taking atorvastatin 20 mg/day as directed.   Denies side effects of the medication--no myalgias or abdominal pain.  Reports diet is \" okay\".   She is not following a low-cholesterol diet.  She is not exercising regularly.  She is not taking ASA daily.  She denies dizziness, claudication, or chest pain.  Due for updated lab work.   9/29/2017 10:25 3/26/2018 10:16 6/16/2018 10:00 12/28/2018 11:46   Cholesterol,Tot 207 (H) 233 (H) 188 243 (H)   Triglycerides 151 (H) 186 (H) 142 203 (H)   HDL 45 46 36 (A) 48    (H) 150 (H) 124 (H) 154 (H)     "

## 2019-12-27 NOTE — PROGRESS NOTES
"CC:   Chief Complaint   Patient presents with   • Diabetes     A1C in office, Thyroid FV   • Numbness     in right leg x 2 weeks     HISTORY OF THE PRESENT ILLNESS: Patient is a 54 y.o. female. This pleasant patient is here today to discuss multiple issues as listed below.    Health Maintenance: Completed.     Type 2 diabetes mellitus without complication, without long-term current use of insulin (HCC)  Chronic problem for the patient that is ongoing.  The patient's last hemoglobin A1c was 6.6% in May 2019. The patient was previously prescribed metformin and metformin ER, but was unable to tolerate the medication due to intolerable GI side effects.  The patient was changed to Jardiance 10 mg/day.  The patient reports that she has had 0 side effects with Jardiance.  Patient is here today to repeat POCT A1c.    Right leg numbness  New problem to examiner.  Patient reports that approximately 2 weeks ago she had an experience with her right lower extremity that felt \"very much like neuropathy, but had no pain\".  Patient describes that her right thigh and knee \"felt funky\".  Patient states that she got off of her bed and her right leg did not work.  States that any weight put on her right leg would cause her right knee to buckle and she would fall to the ground.  Patient states that she took several days of rest, elevation, and over-the-counter ibuprofen and she has slowly been able to tolerate weight on her right lower extremity.  Patient states that symptoms have now plateaued, she is not 100%, and still feels like her right lower extremity is weak and heavy.  Patient denies any injury to her right lower extremity, denies having pain during the episode.  The patient does have a history of chronic low back pain since she was in her 20s, but states that her back has not been bothering her recently.  Patient reports that she looked up her symptoms online as she was concerned she may have had a stroke.  Reports that all of " "her symptoms \"behave like femoral neuropathy, except without pain\".  Patient denies any other symptoms.    Acquired hypothyroidism  Chronic, stable.    Currently taking levothyroxine 100 mcg/day as prescribed.   Due for updated lab work in May 2020.  Denies symptoms including mood changes, anxiety/depression, chest pain/pressure, palpitations, fatigue, heat/cold intolerance, polydipsia, polyuria, diarrhea/constipation, abdominal pain, unexpected weight change, skin changes, hair thickening/coarsening/falling out/thinning, brittle nails, or edema.   6/16/2018 10:00 5/7/2019 09:57   TSH 1.520 0.880   Free T-4 1.06        Essential hypertension  Chronic, stable.    Not currently taking medication for this issue.  Patient was previously prescribed lisinopril, but experienced persistent dry cough.  Patient stopped taking lisinopril in July 2019 and the cough has resolved.  Blood pressure is stable without medications.  Reports diet is \" okay\".   She is not following a low-salt diet.  She is not exercising regularly.  She is not taking baby aspirin daily.   She is monitoring BP at home.   Denies symptoms low BP: light-headed, tunnel-vision, unusual fatigue, dizziness.  Denies symptoms high BP: pounding headache, visual changes, palpitations, flushed face.   Denies chest pain, shortness of breath, dyspnea on exertion.   Denies medicine side effects: unusual fatigue, slow heartbeat, foot/leg swelling, cough.  Vitals 1/2/2019 5/13/2019 7/29/2019 12/26/2019   SYSTOLIC 124 122 128 128   DIASTOLIC 78 64 72 78   PULSE 90 116 91 82       History of smoking at least 1 pack per day for at least 30 years  This is a chronic problem for the patient that is ongoing.  The patient reports that she has a history of smoking at least 1 pack/day for 35+ years. Patient previously referred to the lung cancer screening program, but did not schedule.  Requesting a new referral.  Reviewed requirements for the referral and patient " "qualifies.    Mixed hyperlipidemia  Chronic, uncontrolled.  Currently taking atorvastatin 20 mg/day as directed.   Denies side effects of the medication--no myalgias or abdominal pain.  Reports diet is \" okay\".   She is not following a low-cholesterol diet.  She is not exercising regularly.  She is not taking ASA daily.  She denies dizziness, claudication, or chest pain.  Due for updated lab work.   9/29/2017 10:25 3/26/2018 10:16 6/16/2018 10:00 12/28/2018 11:46   Cholesterol,Tot 207 (H) 233 (H) 188 243 (H)   Triglycerides 151 (H) 186 (H) 142 203 (H)   HDL 45 46 36 (A) 48    (H) 150 (H) 124 (H) 154 (H)     Allergies: Patient has no known allergies.    Current Outpatient Medications Ordered in Epic   Medication Sig Dispense Refill   • Empagliflozin 25 MG Tab Take 25 mg by mouth every day. 90 Tab 1   • atorvastatin (LIPITOR) 20 MG Tab Take 1 Tab by mouth every day. 90 Tab 1   • levothyroxine (SYNTHROID) 100 MCG Tab Take 1 Tab by mouth Every morning on an empty stomach. 90 Tab 2   • lamotrigine (LAMICTAL) 200 MG tablet Take 200 mg by mouth every day.     • buPROPion (WELLBUTRIN XL) 150 MG XL tablet Take 450 mg by mouth every morning.     • sumatriptan (IMITREX) 100 MG tablet Take 1 Tab by mouth Once PRN. 10 Tab 11   • omeprazole (PRILOSEC) 40 MG delayed-release capsule Take 1 Cap by mouth every day. 90 Cap 3   • Blood Glucose Test Strips Test strips order: Test strips for George Contour Next meter. Sig: use once per day #100 RF x 3 100 Strip 3   • amphetamine-dextroamphetamine (ADDERALL, 10MG,) 10 MG Tab Take 10 mg by mouth 2 times a day.     • traZODone (DESYREL) 100 MG Tab TAKE 1/2 TO 1 TABLET BY  MOUTH AT BEDTIME AS NEEDED  FOR SLEEP 90 Tab 0     No current Epic-ordered facility-administered medications on file.      Past Medical History:   Diagnosis Date   • ADD (attention deficit disorder)    • Bipolar 1 disorder (HCC)    • Bipolar disorder (HCC)    • Cervical cancer (HCC) 1984   • Chronic back pain    • " Depression    • Diabetes (HCC)    • Insomnia    • Memory loss due to medical condition     Related to ECT therapy for depression   • Migraine    • Thyroid disease     hypo     Past Surgical History:   Procedure Laterality Date   • PB ENLARGE BREAST WITH IMPLANT  2004   • ABDOMINAL HYSTERECTOMY TOTAL  2004    total   • PRIMARY C SECTION     • TOE ARTHROPLASTY Right     cut tendons, fused joints. last surgery removed last joint of all toes except for big toe     Social History     Tobacco Use   • Smoking status: Former Smoker     Packs/day: 1.00     Years: 35.00     Pack years: 35.00     Types: Cigarettes     Last attempt to quit: 8/23/2016     Years since quitting: 3.3   • Smokeless tobacco: Never Used   Substance Use Topics   • Alcohol use: Yes     Comment: holidays   • Drug use: No     Social History     Patient does not qualify to have social determinant information on file (likely too young).   Social History Narrative   • Not on file     Family History   Problem Relation Age of Onset   • Bipolar disorder Father    • Alcohol abuse Father         ETOH   • Other Father         Cirrhosis   • Diabetes Mother    • Hypertension Mother    • Other Mother         gilberts syndrome   • Alcohol abuse Sister    • Psychiatric Illness Sister    • Alcohol abuse Maternal Grandmother    • Other Maternal Grandmother         Brain Tumor   • Diabetes Maternal Grandfather    • Alzheimer's Disease Maternal Grandfather    • Cancer Maternal Grandfather         Skin   • Cancer Maternal Aunt         stomach   • Diabetes Maternal Uncle    • Alcohol abuse Maternal Uncle    • Stroke Maternal Uncle    • Diabetes Sister    • Hyperlipidemia Sister    • Anxiety disorder Sister    • Other Sister         benign uterine tumor   • Obesity Sister    • Obesity Sister    • Alcohol/Drug Maternal Uncle         prescription narcotics   • Cancer Maternal Uncle      ROS:   Constitutional: No Fevers, Chills  Eyes: No eye pain  ENT: No sore throat  Resp: No  "Shortness of breath  CV: No Chest pain  GI: No Nausea/Vomiting  MSK: + Right lower extremity numbness and weakness.  Skin: No rashes  Neuro: No Headaches  Psych: No Suicidal ideations    All remaining systems reviewed and found to be negative, except as stated above.      Exam: /78 (BP Location: Left arm, Patient Position: Sitting, BP Cuff Size: Adult)   Pulse 82   Temp 35.8 °C (96.5 °F) (Temporal)   Ht 1.727 m (5' 8\")   Wt 84.4 kg (186 lb)   SpO2 95%  Body mass index is 28.28 kg/m².    General: Well nourished, well developed female in NAD, awake and conversant.  Eyes: Normal conjunctiva, anicteric.  Round symmetrical pupils.  ENT: Hearing grossly intact.  No nasal discharge.  Neck: Neck is supple.  No masses or thyromegaly.  CV: No lower extremity edema.  Respiratory: Respirations are nonlabored.  No wheezing.  Abdomen: Non-Distended.  Skin: Warm.  No rashes or ulcers.  MSK: Unsteady gait.  No clubbing or cyanosis.  Neuro: Sensation and CN II-XII grossly normal.  Psych: Alert and oriented.  Cooperative, appropriate mood and affect, normal judgment.    Assessment/Plan:  1. Type 2 diabetes mellitus without complication, without long-term current use of insulin ([facility])  POCT A1c in clinic today 7.1%, up from 6.6%.  The patient reports that she has been tolerating Jardiance 10 mg/day.  Plan to increase Jardiance to 25 mg 1 time per day.  Patient will follow-up in May 2019 to review labs.  - POCT  A1C  - HEMOGLOBIN A1C; Future  - Lipid Profile; Future  - MICROALBUMIN CREAT RATIO URINE (LAB COLLECT); Future  - Empagliflozin 25 MG Tab; Take 25 mg by mouth every day.  Dispense: 90 Tab; Refill: 1    2. Right leg numbness  New problem for the patient that is improving over the last 2 weeks.  Patient reports that her right lower extremity still feels weak and slightly unstable, but denies any pain.  The patient does have peripheral neuropathy and states that symptoms are different.  Patient is interested in referral " to physiatry for EMG of right lower extremity.  Advised patient to be seen in the emergency room if she has any strokelike symptoms, patient understands.  - REFERRAL TO EMG - PHYSIATRY (PMR)    3. Mixed hyperlipidemia  Continue atorvastatin 20 mg/day, does not need refill at this time.  Due for updated labs in May 2020.   - Lipid Profile; Future    4. Acquired hypothyroidism  Continue levothyroxine 100 mcg/day, does not need refill at this time.  Due for updated labs in May 2020.  - TSH WITH REFLEX TO FT4; Future    5. Essential hypertension  Chronic, stable without medication.  Continue to monitor blood pressure at home.  Due for updated labs May 2020.  - CBC WITH DIFFERENTIAL; Future    6. History of smoking at least 1 pack per day for at least 30 years  Patient was previously referred to the lung cancer screening program, but did not schedule an appointment.  She is requesting a new referral.  - REFERRAL TO LUNG CANCER SCREENING PROGRAM    7. Need for vaccination  - tetanus-dipth-acell pertussis (ADACEL) 5-2-15.5 LF-MCG/0.5 Suspension; 0.5 mL by Intramuscular route Once for 1 dose.  Dispense: 0.5 mL; Refill: 0  - Zoster Vac Recomb Adjuvanted (SHINGRIX) 50 MCG/0.5ML Recon Susp; 0.5 mL by Intramuscular route Once for 1 dose.  Dispense: 0.5 mL; Refill: 0  - Influenza Vaccine Quad Injection (PF)    Educated in proper administration of medication(s) ordered today including safety, possible SE, risks, benefits, rationale and alternatives to therapy.   Supportive care, differential diagnoses, and indications for immediate follow-up discussed with patient.    Pathogenesis of diagnosis discussed including typical length and natural progression.    Instructed to return to clinic or nearest emergency department for any change in condition, further concerns, or worsening of symptoms.  Patient states understanding of the plan of care and discharge instructions.    Return in about 5 months (around 5/25/2020) for Follow up  Labs, Diabetes, Thyroid, As needed.    I have placed the below orders and discussed them with an approved delegating provider.  The MA is performing the below orders under the direction of Dr. Barreto.    Please note that this dictation was created using voice recognition software. I have made every reasonable attempt to correct obvious errors, but I expect that there are errors of grammar and possibly content that I did not discover before finalizing the note.

## 2019-12-27 NOTE — ASSESSMENT & PLAN NOTE
Chronic, stable.    Currently taking levothyroxine 100 mcg/day as prescribed.   Due for updated lab work in May 2020.  Denies symptoms including mood changes, anxiety/depression, chest pain/pressure, palpitations, fatigue, heat/cold intolerance, polydipsia, polyuria, diarrhea/constipation, abdominal pain, unexpected weight change, skin changes, hair thickening/coarsening/falling out/thinning, brittle nails, or edema.   6/16/2018 10:00 5/7/2019 09:57   TSH 1.520 0.880   Free T-4 1.06

## 2019-12-31 ENCOUNTER — TELEPHONE (OUTPATIENT)
Dept: HEMATOLOGY ONCOLOGY | Facility: MEDICAL CENTER | Age: 54
End: 2019-12-31

## 2019-12-31 NOTE — TELEPHONE ENCOUNTER
Received referral to lung cancer screening program.  Chart review to assess for lung cancer screening program eligibility.   1. Age 55-77 yrs of age? Yes 54 y.o.  2. 30 pack year hx of smoking, or greater? Yes 1 [id number] hx  3. Current smoker or if quit, has pt quit within last 15 yrs?Yes  Quit 8/23/2016  4. Any signs or symptoms of lung cancer? None noted  5. Previous history of lung cancer? None noted  6. Chest CT within past 12 mos.? None noted  Patient does meet eligibility criteria. [facility] scheduling notified to schedule the shared decision making visit.

## 2020-01-02 ENCOUNTER — TELEPHONE (OUTPATIENT)
Dept: HEMATOLOGY ONCOLOGY | Facility: MEDICAL CENTER | Age: 55
End: 2020-01-02

## 2020-01-02 NOTE — TELEPHONE ENCOUNTER
1st attempt to contact the patient,- Unable to leave voicemail (mailbox not set up) for patient requesting a return call to schedule their shared decision making appointment.   [facility]

## 2020-01-07 NOTE — TELEPHONE ENCOUNTER
2nd attempt to contact the patient,- Unable to leave voicemail (mailbox not set up) for patient requesting a return call to schedule their shared decision making appointment.   \[facility]\""P/ Medicare/ Nicotine Dependence/ Sis Ham

## 2020-01-10 NOTE — TELEPHONE ENCOUNTER
3rd attempt to contact the patient,- Unable to leave voicemail (mailbox not set up), mailed a letter to the patient requesting a return call to schedule their shared decision making appointment.   [facility]/ Medicare/ Nicotine Dependence/ Sis Ham

## 2020-01-14 ENCOUNTER — OFFICE VISIT (OUTPATIENT)
Dept: HEMATOLOGY ONCOLOGY | Facility: MEDICAL CENTER | Age: 55
End: 2020-01-14
Payer: MEDICARE

## 2020-01-14 VITALS
RESPIRATION RATE: 16 BRPM | TEMPERATURE: 98.6 F | HEART RATE: 108 BPM | HEIGHT: 68 IN | BODY MASS INDEX: 28.37 KG/M2 | SYSTOLIC BLOOD PRESSURE: 122 MMHG | DIASTOLIC BLOOD PRESSURE: 68 MMHG | OXYGEN SATURATION: 94 % | WEIGHT: 187.17 LBS

## 2020-01-14 DIAGNOSIS — Z87.891 PERSONAL HISTORY OF TOBACCO USE, PRESENTING HAZARDS TO HEALTH: ICD-10-CM

## 2020-01-14 PROCEDURE — G0296 VISIT TO DETERM LDCT ELIG: HCPCS | Performed by: NURSE PRACTITIONER

## 2020-01-14 RX ORDER — FLUOXETINE HYDROCHLORIDE 40 MG/1
CAPSULE ORAL
COMMUNITY
Start: 2019-10-17 | End: 2020-01-14

## 2020-01-14 RX ORDER — TRAZODONE HYDROCHLORIDE 50 MG/1
TABLET ORAL
COMMUNITY
Start: 2019-12-06 | End: 2020-01-14

## 2020-01-14 RX ORDER — LEVOTHYROXINE SODIUM 0.1 MG/1
TABLET ORAL
COMMUNITY
Start: 2019-11-22 | End: 2020-01-14

## 2020-01-14 RX ORDER — EMPAGLIFLOZIN 25 MG/1
TABLET, FILM COATED ORAL
COMMUNITY
Start: 2019-12-26 | End: 2020-01-14

## 2020-01-14 RX ORDER — MODAFINIL 100 MG/1
100 TABLET ORAL
COMMUNITY
Start: 2019-12-26 | End: 2021-03-15

## 2020-01-14 RX ORDER — TRAZODONE HYDROCHLORIDE 100 MG/1
TABLET ORAL
COMMUNITY
Start: 2019-12-18 | End: 2020-01-14

## 2020-01-14 RX ORDER — OMEPRAZOLE 40 MG/1
CAPSULE, DELAYED RELEASE ORAL
COMMUNITY
Start: 2019-12-11 | End: 2020-01-14

## 2020-01-14 RX ORDER — MODAFINIL 100 MG/1
TABLET ORAL
COMMUNITY
Start: 2019-12-26 | End: 2020-01-14 | Stop reason: CLARIF

## 2020-01-14 RX ORDER — LISINOPRIL 5 MG/1
TABLET ORAL
COMMUNITY
Start: 2019-10-30 | End: 2020-01-14

## 2020-01-14 RX ORDER — ATORVASTATIN CALCIUM 20 MG/1
TABLET, FILM COATED ORAL
COMMUNITY
Start: 2019-12-13 | End: 2020-01-14

## 2020-01-14 RX ORDER — LAMOTRIGINE 200 MG/1
TABLET ORAL
COMMUNITY
Start: 2020-01-13 | End: 2020-01-14

## 2020-01-14 RX ORDER — DEXTROAMPHETAMINE SACCHARATE, AMPHETAMINE ASPARTATE, DEXTROAMPHETAMINE SULFATE AND AMPHETAMINE SULFATE 2.5; 2.5; 2.5; 2.5 MG/1; MG/1; MG/1; MG/1
TABLET ORAL
COMMUNITY
Start: 2019-11-17 | End: 2020-01-14

## 2020-01-14 ASSESSMENT — PAIN SCALES - GENERAL: PAINLEVEL: NO PAIN

## 2020-01-14 ASSESSMENT — ENCOUNTER SYMPTOMS
SHORTNESS OF BREATH: 0
WHEEZING: 0
WEIGHT LOSS: 0
COUGH: 1
SPUTUM PRODUCTION: 0
HEMOPTYSIS: 0

## 2020-01-14 NOTE — PROGRESS NOTES
Subjective:      Frances Delgado is a 54 y.o. female who presents for Lung Cancer Screening Program Prescreen (Dx: Nicotine Dependence Ref: JEFFREY Bernal) for lung cancer screening shared decision making visit.        HPI   Patient seen today for initial lung cancer screening visit. Patient referred by PCP, JEFFREY Bernal.     The patient meets eligibility criteria including age, smoking history (30+ pack years), if former smoker, quit in the last 15 years, and absence of signs or symptoms of lung cancer.    - Age - 54  - Smoking history - Patient has smoked for 38 years at an average of 1 ppd = 38 pack year smoking history.  - Current smoking status - former smoker  - No symptoms of lung cancer and no previous history of lung cancer       Allergies   Allergen Reactions   • Lisinopril Cough           Current Outpatient Medications on File Prior to Visit   Medication Sig Dispense Refill   • modafinil (PROVIGIL) 100 MG Tab Take 100 mg by mouth.     • Empagliflozin 25 MG Tab Take 25 mg by mouth every day. 90 Tab 1   • atorvastatin (LIPITOR) 20 MG Tab Take 1 Tab by mouth every day. 90 Tab 1   • levothyroxine (SYNTHROID) 100 MCG Tab Take 1 Tab by mouth Every morning on an empty stomach. 90 Tab 2   • lamotrigine (LAMICTAL) 200 MG tablet Take 200 mg by mouth every day.     • sumatriptan (IMITREX) 100 MG tablet Take 1 Tab by mouth Once PRN. 10 Tab 11   • omeprazole (PRILOSEC) 40 MG delayed-release capsule Take 1 Cap by mouth every day. 90 Cap 3   • Blood Glucose Test Strips Test strips order: Test strips for [employer] Contour Next meter. Sig: use once per day #100 RF x 3 100 Strip 3   • traZODone (DESYREL) 100 MG Tab TAKE 1/2 TO 1 TABLET BY  MOUTH AT BEDTIME AS NEEDED  FOR SLEEP 90 Tab 0     No current facility-administered medications on file prior to visit.            Review of Systems   Constitutional: Positive for malaise/fatigue (due to bipolar meds). Negative for weight loss.   Respiratory: Positive for  "cough (due to BP meds - improved with change). Negative for hemoptysis, sputum production, shortness of breath and wheezing.           Objective:     /68 (BP Location: Right arm, Patient Position: Sitting, BP Cuff Size: Adult)   Pulse (!) 108   Temp 37 °C (98.6 °F) (Temporal)   Resp 16   Ht 1.72 m (5' 7.72\")   Wt 84.9 kg (187 lb 2.7 oz)   SpO2 94%   BMI 28.70 kg/m²      Physical Exam  Vitals signs reviewed.   Constitutional:       General: She is not in acute distress.     Appearance: She is well-developed. She is not diaphoretic.   Cardiovascular:      Rate and Rhythm: Normal rate and regular rhythm.      Heart sounds: Normal heart sounds. No murmur. No friction rub. No gallop.    Pulmonary:      Effort: Pulmonary effort is normal. No respiratory distress.      Breath sounds: Normal breath sounds. No wheezing.   Musculoskeletal: Normal range of motion.   Skin:     General: Skin is warm and dry.   Neurological:      Mental Status: She is alert and oriented to person, place, and time.            Assessment/Plan:       1. Personal history of tobacco use, presenting hazards to health  CT-LUNG CANCER-SCREENING       We conducted a shared decision-making process using a decision aid. We reviewed benefits and harms of screening, including false positives and potential need for additional diagnostic testing, the possibility of over diagnosis, and total radiation exposure.    We discussed the importance of adhering to annual LDCT screening. We also discussed the impact of comorbities on the patient's the ability or willingness to undergo diagnostic procedure(s) and treatment.    Counseling on the importance of maintaining cigarette smoking abstinence if former smoker; or the importance of smoking cessation if current smoker and, if appropriate, furnishing of information about tobacco cessation interventions.    Based on our discussion, we have decided to begin annual lung cancer screening starting now.      "

## 2020-02-04 ENCOUNTER — HOSPITAL ENCOUNTER (OUTPATIENT)
Dept: RADIOLOGY | Facility: MEDICAL CENTER | Age: 55
End: 2020-02-04
Attending: NURSE PRACTITIONER
Payer: MEDICARE

## 2020-02-04 DIAGNOSIS — Z12.31 ENCOUNTER FOR SCREENING MAMMOGRAM FOR BREAST CANCER: ICD-10-CM

## 2020-02-04 PROCEDURE — 77067 SCR MAMMO BI INCL CAD: CPT

## 2020-02-19 ENCOUNTER — OFFICE VISIT (OUTPATIENT)
Dept: PHYSICAL MEDICINE AND REHAB | Facility: MEDICAL CENTER | Age: 55
End: 2020-02-19
Payer: MEDICARE

## 2020-02-19 VITALS
HEIGHT: 68 IN | HEART RATE: 115 BPM | SYSTOLIC BLOOD PRESSURE: 124 MMHG | DIASTOLIC BLOOD PRESSURE: 68 MMHG | WEIGHT: 188.93 LBS | TEMPERATURE: 97.8 F | OXYGEN SATURATION: 94 % | BODY MASS INDEX: 28.63 KG/M2

## 2020-02-19 DIAGNOSIS — G62.9 PERIPHERAL POLYNEUROPATHY: ICD-10-CM

## 2020-02-19 DIAGNOSIS — F31.81 BIPOLAR II DISORDER (HCC): ICD-10-CM

## 2020-02-19 DIAGNOSIS — F90.9 ATTENTION DEFICIT HYPERACTIVITY DISORDER (ADHD), UNSPECIFIED ADHD TYPE: ICD-10-CM

## 2020-02-19 DIAGNOSIS — Z78.9 IMPAIRED MOBILITY AND ADLS: ICD-10-CM

## 2020-02-19 DIAGNOSIS — R26.9 ABNORMAL GAIT: ICD-10-CM

## 2020-02-19 DIAGNOSIS — E11.9 TYPE 2 DIABETES MELLITUS WITHOUT COMPLICATION, WITHOUT LONG-TERM CURRENT USE OF INSULIN (HCC): ICD-10-CM

## 2020-02-19 DIAGNOSIS — M54.41 CHRONIC RIGHT-SIDED LOW BACK PAIN WITH RIGHT-SIDED SCIATICA: ICD-10-CM

## 2020-02-19 DIAGNOSIS — Z74.09 IMPAIRED MOBILITY AND ADLS: ICD-10-CM

## 2020-02-19 DIAGNOSIS — R29.898 RIGHT LEG WEAKNESS: ICD-10-CM

## 2020-02-19 DIAGNOSIS — R20.0 RIGHT LEG NUMBNESS: ICD-10-CM

## 2020-02-19 DIAGNOSIS — G89.29 CHRONIC RIGHT-SIDED LOW BACK PAIN WITH RIGHT-SIDED SCIATICA: ICD-10-CM

## 2020-02-19 PROCEDURE — 99205 OFFICE O/P NEW HI 60 MIN: CPT | Performed by: PHYSICAL MEDICINE & REHABILITATION

## 2020-02-19 ASSESSMENT — PAIN SCALES - GENERAL: PAINLEVEL: NO PAIN

## 2020-02-19 ASSESSMENT — PATIENT HEALTH QUESTIONNAIRE - PHQ9
SUM OF ALL RESPONSES TO PHQ QUESTIONS 1-9: 10
CLINICAL INTERPRETATION OF PHQ2 SCORE: 2
5. POOR APPETITE OR OVEREATING: 2 - MORE THAN HALF THE DAYS

## 2020-02-19 NOTE — Clinical Note
Dear Sis Ham, JONAS.P.[email or web address]. ,   Thank you for the referral of Frances Delgado.  Please see my note for more details    Should you have any questions or concerns please do not hesitate to contact me.  Anton Ly M.D.

## 2020-02-19 NOTE — PROGRESS NOTES
"New patient note    Physiatry (physical medicine and  Rehabilitation), interventional spine and sports medicine    Date of service: See epic    Chief complaint:   Chief Complaint   Patient presents with   • New Patient     Leg numbness        HISTORY    HPI: Frances Delgado 54 y.o. female who presents today with Diagnoses of Bipolar II disorder (HCC), Attention deficit hyperactivity disorder (ADHD), unspecified ADHD type, Peripheral polyneuropathy (HCC), Type 2 diabetes mellitus without complication, without long-term current use of insulin (HCC), Chronic right-sided low back pain with right-sided sciatica, Right leg numbness, Right leg weakness, Abnormal gait, and Impaired mobility and ADLs were pertinent to this visit.    HPI    Right leg numbness starting 3 months ago, worse after sitting to watch a football game. Associated weakness at that time. There continues to be numbness and weakness of the right leg. she did have a lower back injury 30 years ago.  anterior right leg. She intermittently feels like the leg wants to \"snap back\" and gives out.  She feels like the symptoms started in the right groin and radiate down the anterior aspect of the right leg.    She follows up with psychology once per month. Denies SI/HI     Psychological testing for pain as depression and pain commonly coexist and need to both be treated.   Opioid Risk Score: 6    Interpretation of Opioid Risk Score   Score 0-3 = Low risk of abuse. Do UDS at least once per year.  Score 4-7 = Moderate risk of abuse. Do UDS 1-4 times per year.  Score 8+ = High risk of abuse. Refer to specialist.    PHQ  Depression Screen (PHQ-2/PHQ-9) 5/23/2017 6/21/2018 2/19/2020   PHQ-2 Total Score 0 0 2   PHQ-9 Total Score - - 10       Interpretation of PHQ-9 Total Score   Score Severity   1-4 No Depression   5-9 Mild Depression   10-14 Moderate Depression   15-19 Moderately Severe Depression   20-27 Severe Depression     Medical records review:  I reviewed the " notes from the PCP DANIEL Gallegos. Type 2 diabetes mellitus, worsening A1c but still relatively controlled at 7.1.  Right leg numbness referred to physiatry, mixed hyperlipidemia on atorvastatin with labs ordered, hypothyroidism on levothyroxine      ROS:   Positive for bipolar disorder.  Positive for history of gastric ulcers when she was a teenager but none recently.  Red Flags ROS:   Fever, Chills, Sweats: Denies  Involuntary Weight Loss: Denies  Bladder Incontinence: Denies  Bowel Incontinence: denies  Saddle Anesthesia: Denies    All other systems reviewed and negative.       PMHx:   Past Medical History:   Diagnosis Date   • ADD (attention deficit disorder)    • Bipolar 1 disorder (HCC)    • Bipolar disorder (HCC)    • Cervical cancer ([facility]) 1984   • Chronic back pain    • Depression    • Diabetes (HCC)    • Insomnia    • Memory loss due to medical condition     Related to ECT therapy for depression   • Migraine    • Thyroid disease     hypo         Current Outpatient Medications on File Prior to Visit   Medication Sig Dispense Refill   • modafinil (PROVIGIL) 100 MG Tab Take 100 mg by mouth.     • atorvastatin (LIPITOR) 20 MG Tab Take 1 Tab by mouth every day. 90 Tab 1   • levothyroxine (SYNTHROID) 100 MCG Tab Take 1 Tab by mouth Every morning on an empty stomach. 90 Tab 2   • lamotrigine (LAMICTAL) 200 MG tablet Take 200 mg by mouth every day.     • sumatriptan (IMITREX) 100 MG tablet Take 1 Tab by mouth Once PRN. 10 Tab 11   • omeprazole (PRILOSEC) 40 MG delayed-release capsule Take 1 Cap by mouth every day. 90 Cap 3   • Blood Glucose Test Strips Test strips order: Test strips for George Contour Next meter. Sig: use once per day #100 RF x 3 100 Strip 3   • traZODone (DESYREL) 100 MG Tab TAKE 1/2 TO 1 TABLET BY  MOUTH AT BEDTIME AS NEEDED  FOR SLEEP 90 Tab 0   • Empagliflozin 25 MG Tab Take 25 mg by mouth every day. (Patient not taking: Reported on 2/19/2020) 90 Tab 1     No current  facility-administered medications on file prior to visit.         PSHx:   Past Surgical History:   Procedure Laterality Date   • PB ENLARGE BREAST WITH IMPLANT  2004   • ABDOMINAL HYSTERECTOMY TOTAL  2004    total   • PRIMARY C SECTION     • TOE ARTHROPLASTY Right     cut tendons, fused joints. last surgery removed last joint of all toes except for big toe       Family history   Family History   Problem Relation Age of Onset   • Bipolar disorder Father    • Alcohol abuse Father         ETOH   • Other Father         Cirrhosis   • Diabetes Mother    • Hypertension Mother    • Other Mother         gilberts syndrome   • Alcohol abuse Sister    • Psychiatric Illness Sister    • Alcohol abuse Maternal Grandmother    • Other Maternal Grandmother         Brain Tumor   • Diabetes Maternal Grandfather    • Alzheimer's Disease Maternal Grandfather    • Cancer Maternal Grandfather         Skin   • Cancer Maternal Aunt         stomach   • Diabetes Maternal Uncle    • Alcohol abuse Maternal Uncle    • Stroke Maternal Uncle    • Diabetes Sister    • Hyperlipidemia Sister    • Anxiety disorder Sister    • Other Sister         benign uterine tumor   • Obesity Sister    • Obesity Sister    • Alcohol/Drug Maternal Uncle         prescription narcotics   • Cancer Maternal Uncle          Medications: reviewed on epic.   Outpatient Medications Marked as Taking for the 2/19/20 encounter (Office Visit) with Anton Ly M.D.   Medication Sig Dispense Refill   • modafinil (PROVIGIL) 100 MG Tab Take 100 mg by mouth.     • atorvastatin (LIPITOR) 20 MG Tab Take 1 Tab by mouth every day. 90 Tab 1   • levothyroxine (SYNTHROID) 100 MCG Tab Take 1 Tab by mouth Every morning on an empty stomach. 90 Tab 2   • lamotrigine (LAMICTAL) 200 MG tablet Take 200 mg by mouth every day.     • sumatriptan (IMITREX) 100 MG tablet Take 1 Tab by mouth Once PRN. 10 Tab 11   • omeprazole (PRILOSEC) 40 MG delayed-release capsule Take 1 Cap by mouth every day. 90  Cap 3   • Blood Glucose Test Strips Test strips order: Test strips for George Contour Next meter. Sig: use once per day #100 RF x 3 100 Strip 3   • traZODone (DESYREL) 100 MG Tab TAKE 1/2 TO 1 TABLET BY  MOUTH AT BEDTIME AS NEEDED  FOR SLEEP 90 Tab 0        Allergies:   Allergies   Allergen Reactions   • Lisinopril Cough       Social Hx:   Social History     Socioeconomic History   • Marital status: Single     Spouse name: Not on file   • Number of children: Not on file   • Years of education: Not on file   • Highest education level: Not on file   Occupational History   • Not on file   Social Needs   • Financial resource strain: Not on file   • Food insecurity     Worry: Not on file     Inability: Not on file   • Transportation needs     Medical: Not on file     Non-medical: Not on file   Tobacco Use   • Smoking status: Former Smoker     Packs/day: 1.00     Years: 38.00     Pack years: 38.00     Types: Cigarettes     Last attempt to quit: 8/23/2016     Years since quitting: 3.4   • Smokeless tobacco: Never Used   • Tobacco comment: age [age]   Substance and Sexual Activity   • Alcohol use: Yes     Comment: holidays   • Drug use: No   • Sexual activity: Yes     Partners: Male     Birth control/protection: Surgical     Comment:  1.5 years   Lifestyle   • Physical activity     Days per week: Not on file     Minutes per session: Not on file   • Stress: Not on file   Relationships   • Social connections     Talks on phone: Not on file     Gets together: Not on file     Attends [employer] service: Not on file     Active member of club or organization: Not on file     Attends meetings of clubs or organizations: Not on file     Relationship status: Not on file   • Intimate partner violence     Fear of current or ex partner: Not on file     Emotionally abused: Not on file     Physically abused: Not on file     Forced sexual activity: Not on file   Other Topics Concern   •  Service No   • Blood Transfusions No  "  • Caffeine Concern No   • Occupational Exposure No   • Hobby Hazards No   • Sleep Concern Yes   • Stress Concern No   • Weight Concern No   • Special Diet No   • Back Care No   • Exercise Yes   • Bike Helmet No   • Seat Belt Yes   • Self-Exams Yes   Social History Narrative   • Not on file         EXAMINATION     Physical Exam:   Vitals: /68 (BP Location: Left arm, Patient Position: Sitting, BP Cuff Size: Adult)   Pulse (!) 115   Temp 36.6 °C (97.8 °F) (Temporal)   Ht 1.727 m (5' 8\")   Wt 85.7 kg (188 lb 15 oz)   SpO2 94%     Constitutional:   Body Habitus: Body mass index is 28.73 kg/m².  Cooperation: Fully cooperates with exam  Appearance: Well-groomed, well-nourished, not disheveled     Eyes: No scleral icterus to suggest severe liver disease, no proptosis to suggest severe hyperthyroid    ENT -no obvious auditory deficits, no obvious tongue lesions, tongue midline, no facial droop     Skin -no rashes or lesions noted     Respiratory-  breathing comfortable on room air, no audible wheezing    Cardiovascular- capillary refills less than 2 seconds. No lower extremity edema is noted.     Gastrointestinal - no obvious abdominal masses, No tenderness to palpation in the abdomen    Psychiatric- alert and oriented ×3. Normal affect.     Gait - genu recurvatum gait pattern on the right.    Musculoskeletal -   Thoracic/Lumbar Spine/Sacral Spine/Hips   Inspection: No evidence of atrophy in bilateral lower extremities throughout     ROM: full  active range of motion with flexion, lateral flexion, and rotation bilaterally.   There is full  active range of motion with lumbar extension.      Palpation:   No tenderness to palpation in midline at T1-T12 levels. No tenderness to palpation in the left and right of the midline T1-L5, NEGATIVE for tenderness to palpation to the para-midline region in the lower lumbar levels.  palpation over SI joint: negative bilaterally    palpation over buttock: negative bilaterally  "   palpation in hip or over the greater trochanters: negative bilaterally      Lumbar spine Special tests  Neuro tension  Straight leg test negative bilaterally    Slump test negative bilaterally      HIP  FAIR test negative bilaterally    Range of motion in the hips is within normal limits in flexion, extension, abduction, internal rotation, external rotation.      Neuro         Key points for the international standards for neurological classification of spinal cord injury (ISNCSCI) to light touch.     Dermatome R L                                      L2 2 2   L3 1 2   L4 2 2   L5 2 2   S1 2 2   S2 2 2       Motor Exam Lower Extremities    ? Myotome R L   Hip flexion L2 5 5   Knee extension L3 3 5   Ankle dorsiflexion L4 5 5   Toe extension L5 5 5   Ankle plantarflexion S1 5 5         Reflexes  ?  R L               Patella  2+ 2+   Achilles   2+ 2+        Babinski sign negative bilaterally   Clonus of the ankle negative bilaterally       MEDICAL DECISION MAKING    Medical records review: see under HPI section.     DATA    Labs:   Lab Results   Component Value Date/Time    SODIUM 139 05/07/2019 09:58 AM    POTASSIUM 4.4 05/07/2019 09:58 AM    CHLORIDE 102 05/07/2019 09:58 AM    CO2 29 05/07/2019 09:58 AM    ANION 8.0 05/07/2019 09:58 AM    GLUCOSE 134 (H) 05/07/2019 09:58 AM    BUN 9 05/07/2019 09:58 AM    CREATININE 1.05 05/07/2019 09:58 AM    CALCIUM 9.5 05/07/2019 09:58 AM    ASTSGOT 23 05/07/2019 09:58 AM    ALTSGPT 39 05/07/2019 09:58 AM    TBILIRUBIN 0.7 05/07/2019 09:58 AM    ALBUMIN 4.6 05/07/2019 09:58 AM    TOTPROTEIN 6.6 05/07/2019 09:58 AM    GLOBULIN 2.0 05/07/2019 09:58 AM    AGRATIO 2.3 05/07/2019 09:58 AM   ]    No results found for: PROTHROMBTM, INR     No results found for: WBC, RBC, HEMOGLOBIN, HEMATOCRIT, MCV, MCH, MCHC, MPV, NEUTSPOLYS, LYMPHOCYTES, MONOCYTES, EOSINOPHILS, BASOPHILS, HYPOCHROMIA, ANISOCYTOSIS     Lab Results   Component Value Date/Time    HBA1C 7.1 (A) 12/26/2019 02:31 PM         Imaging:   I personally reviewed following images, these are my reads  X-ray hips 10/22/2015  No acute abnormalities.  No significant degenerative changes.    IMAGING radiology reads. I reviewed the following radiology reads                                                                                                                                  Results for orders placed during the hospital encounter of 05/13/19   DX-HAND 3+ RIGHT    Impression Negative RIGHT hand series.            Results for orders placed in visit on 10/22/15   DX-HIPS-COMPLETE - BILATERAL 3+    Impression 1.  Pelvis/hip series within normal limits    2.  Facet arthropathy of the lumbar spine                                                       Diagnosis   Visit Diagnoses     ICD-10-CM   1. Bipolar II disorder (HCC) F31.81   2. Attention deficit hyperactivity disorder (ADHD), unspecified ADHD type F90.9   3. Peripheral polyneuropathy (HCC) G62.9   4. Type 2 diabetes mellitus without complication, without long-term current use of insulin (HCC) E11.9   5. Chronic right-sided low back pain with right-sided sciatica M54.41    G89.29   6. Right leg numbness R20.0   7. Right leg weakness R29.898   8. Abnormal gait R26.9   9. Impaired mobility and ADLs Z74.09           ASSESSMENT AND PLAN:  Frances GUTIERREZ Sandy 54 y.o. female      Frances was seen today for new patient.    Diagnoses and all orders for this visit:    Bipolar II disorder (HCC)  -     Patient has been identified as having a positive depression screening. Appropriate orders and counseling have been given.    Attention deficit hyperactivity disorder (ADHD), unspecified ADHD type  -     Patient has been identified as having a positive depression screening. Appropriate orders and counseling have been given.    Peripheral polyneuropathy (HCC)  -     Patient has been identified as having a positive depression screening. Appropriate orders and counseling have been given.    Type 2 diabetes  mellitus without complication, without long-term current use of insulin (HCC)    Chronic right-sided low back pain with right-sided sciatica  -     REFERRAL TO EMG - PHYSIATRY (PMR)    Right leg numbness  -     REFERRAL TO EMG - PHYSIATRY (PMR)    Right leg weakness  -     REFERRAL TO EMG - PHYSIATRY (PMR)    Abnormal gait  -     REFERRAL TO EMG - PHYSIATRY (PMR)    Impaired mobility and ADLs  -     REFERRAL TO EMG - PHYSIATRY (PMR)      The patient has weakness, numbness and impaired ADLs as above likely secondary to an upper lumbar radiculopathy versus plexopathy versus femoral neuropathy.    PLAN  Physical therapy: Not indicated at this time    home exercise program: I encouraged regular strengthening and stretching with a home exercise program     Diagnostic workup: I recommend the PCP to consider work-up for a femoral neuropathy or hernia which may include a CT abdomen pelvis with contrast.     I have ordered an EMG/nerve conduction study as above.    Medications: We discussed gabapentin and neuropathic pain medications and the patient declined    Interventional program: To be considered after the diagnostic work-up    We discussed extreme care when on stairs or uneven ground and using a cane in the left hand during the diagnostic work-up.  We discussed that she has a significant fall risk and fall prevention strategies.    Outside records requested:  The patient signed outside records request form for her outside records including outside images.      Follow-up: After the above diagnostic studies       Please note that this dictation was created using voice recognition software. I have made every reasonable attempt to correct obvious errors but there may be errors of grammar and content that I may have overlooked prior to finalization of this note.      Anton Ly MD  Physical Medicine and Rehabilitation  Interventional Spine and Sports Physiatry  [facility]           ROBERTO Ham,  CRISTINA

## 2020-02-23 DIAGNOSIS — R29.898 RIGHT LEG WEAKNESS: ICD-10-CM

## 2020-02-23 DIAGNOSIS — G89.29 CHRONIC BILATERAL LOW BACK PAIN WITHOUT SCIATICA: ICD-10-CM

## 2020-02-23 DIAGNOSIS — R20.0 RIGHT LEG NUMBNESS: ICD-10-CM

## 2020-02-23 DIAGNOSIS — M54.50 CHRONIC BILATERAL LOW BACK PAIN WITHOUT SCIATICA: ICD-10-CM

## 2020-02-24 NOTE — PROGRESS NOTES
1. Chronic bilateral low back pain without sciatica  2. Right leg numbness  3. Right leg weakness  Recommended by Dr. Ly to complete CT abd/pelvis with contrast to evaluate for femoral neuropathy or hernia.   - CT-ABDOMEN WITH; Future  - Basic Metabolic Panel; Future

## 2020-03-04 ENCOUNTER — APPOINTMENT (OUTPATIENT)
Dept: RADIOLOGY | Facility: MEDICAL CENTER | Age: 55
End: 2020-03-04
Attending: NURSE PRACTITIONER
Payer: MEDICARE

## 2020-03-23 ENCOUNTER — APPOINTMENT (OUTPATIENT)
Dept: RADIOLOGY | Facility: MEDICAL CENTER | Age: 55
End: 2020-03-23
Attending: NURSE PRACTITIONER
Payer: MEDICARE

## 2020-03-27 ENCOUNTER — APPOINTMENT (OUTPATIENT)
Dept: PHYSICAL MEDICINE AND REHAB | Facility: MEDICAL CENTER | Age: 55
End: 2020-03-27
Payer: MEDICARE

## 2020-04-10 DIAGNOSIS — E03.9 ACQUIRED HYPOTHYROIDISM: ICD-10-CM

## 2020-04-13 RX ORDER — LEVOTHYROXINE SODIUM 0.1 MG/1
100 TABLET ORAL
Qty: 90 TAB | Refills: 1 | Status: SHIPPED | OUTPATIENT
Start: 2020-04-13 | End: 2020-09-28

## 2020-04-13 NOTE — TELEPHONE ENCOUNTER
Requested Prescriptions     Pending Prescriptions Disp Refills   • levothyroxine (SYNTHROID) 100 MCG Tab [Pharmacy Med Name: LEVOTHYROXINE  100MCG  TAB] 90 Tab 1     Sig: Take 1 Tab by mouth Every morning on an empty stomach.       DANIEL Gallegos.

## 2020-04-13 NOTE — TELEPHONE ENCOUNTER
Received request via: Pharmacy    Was the patient seen in the last year in this department? Yes 12/26/19    Does the patient have an active prescription (recently filled or refills available) for medication(s) requested? No

## 2020-04-29 DIAGNOSIS — K21.9 GASTROESOPHAGEAL REFLUX DISEASE WITHOUT ESOPHAGITIS: ICD-10-CM

## 2020-04-30 RX ORDER — OMEPRAZOLE 40 MG/1
40 CAPSULE, DELAYED RELEASE ORAL DAILY
Qty: 90 CAP | Refills: 3 | Status: SHIPPED | OUTPATIENT
Start: 2020-04-30 | End: 2021-03-30

## 2020-05-01 DIAGNOSIS — E78.2 MIXED HYPERLIPIDEMIA: ICD-10-CM

## 2020-05-04 RX ORDER — ATORVASTATIN CALCIUM 20 MG/1
TABLET, FILM COATED ORAL
Qty: 90 TAB | Refills: 0 | Status: SHIPPED | OUTPATIENT
Start: 2020-05-04 | End: 2020-07-27

## 2020-05-04 NOTE — TELEPHONE ENCOUNTER
Requested Prescriptions     Pending Prescriptions Disp Refills   • atorvastatin (LIPITOR) 20 MG Tab [Pharmacy Med Name: ATORVASTATIN  20MG  TAB] 90 Tab 0     Sig: TAKE 1 TABLET BY MOUTH  EVERY DAY       DANIEL Gallegos.

## 2020-05-14 DIAGNOSIS — E11.9 TYPE 2 DIABETES MELLITUS WITHOUT COMPLICATION, WITHOUT LONG-TERM CURRENT USE OF INSULIN (HCC): Chronic | ICD-10-CM

## 2020-05-15 RX ORDER — EMPAGLIFLOZIN 25 MG/1
1 TABLET, FILM COATED ORAL DAILY
Qty: 90 TAB | Refills: 0 | Status: SHIPPED | OUTPATIENT
Start: 2020-05-15 | End: 2020-08-10

## 2020-05-15 NOTE — TELEPHONE ENCOUNTER
Received request via: Pharmacy    Was the patient seen in the last year in this department? Yes lov 12/26/19    Does the patient have an active prescription (recently filled or refills available) for medication(s) requested? No

## 2020-05-16 NOTE — TELEPHONE ENCOUNTER
Requested Prescriptions     Pending Prescriptions Disp Refills   • Empagliflozin (JARDIANCE) 25 MG Tab [Pharmacy Med Name: JARDIANCE  25MG  TAB] 90 Tab 0     Sig: Take 1 Tab by mouth every day.       DANIEL Gallegos.

## 2020-05-21 ENCOUNTER — HOSPITAL ENCOUNTER (OUTPATIENT)
Dept: LAB | Facility: MEDICAL CENTER | Age: 55
End: 2020-05-21
Attending: NURSE PRACTITIONER
Payer: MEDICARE

## 2020-05-21 DIAGNOSIS — E03.9 ACQUIRED HYPOTHYROIDISM: Chronic | ICD-10-CM

## 2020-05-21 DIAGNOSIS — I10 ESSENTIAL HYPERTENSION: Chronic | ICD-10-CM

## 2020-05-21 DIAGNOSIS — E11.9 TYPE 2 DIABETES MELLITUS WITHOUT COMPLICATION, WITHOUT LONG-TERM CURRENT USE OF INSULIN (HCC): Chronic | ICD-10-CM

## 2020-05-21 LAB
BASOPHILS # BLD AUTO: 1.1 % (ref 0–1.8)
BASOPHILS # BLD: 0.05 K/UL (ref 0–0.12)
CHOLEST SERPL-MCNC: 157 MG/DL (ref 100–199)
CREAT UR-MCNC: 103.75 MG/DL
EOSINOPHIL # BLD AUTO: 0.22 K/UL (ref 0–0.51)
EOSINOPHIL NFR BLD: 4.9 % (ref 0–6.9)
ERYTHROCYTE [DISTWIDTH] IN BLOOD BY AUTOMATED COUNT: 44 FL (ref 35.9–50)
EST. AVERAGE GLUCOSE BLD GHB EST-MCNC: 180 MG/DL
FASTING STATUS PATIENT QL REPORTED: NORMAL
HBA1C MFR BLD: 7.9 % (ref 0–5.6)
HCT VFR BLD AUTO: 49.3 % (ref 37–47)
HDLC SERPL-MCNC: 43 MG/DL
HGB BLD-MCNC: 16.1 G/DL (ref 12–16)
IMM GRANULOCYTES # BLD AUTO: 0.01 K/UL (ref 0–0.11)
IMM GRANULOCYTES NFR BLD AUTO: 0.2 % (ref 0–0.9)
LDLC SERPL CALC-MCNC: 91 MG/DL
LYMPHOCYTES # BLD AUTO: 1.4 K/UL (ref 1–4.8)
LYMPHOCYTES NFR BLD: 31 % (ref 22–41)
MCH RBC QN AUTO: 29.9 PG (ref 27–33)
MCHC RBC AUTO-ENTMCNC: 32.7 G/DL (ref 33.6–35)
MCV RBC AUTO: 91.5 FL (ref 81.4–97.8)
MICROALBUMIN UR-MCNC: <1.2 MG/DL
MICROALBUMIN/CREAT UR: NORMAL MG/G (ref 0–30)
MONOCYTES # BLD AUTO: 0.33 K/UL (ref 0–0.85)
MONOCYTES NFR BLD AUTO: 7.3 % (ref 0–13.4)
NEUTROPHILS # BLD AUTO: 2.51 K/UL (ref 2–7.15)
NEUTROPHILS NFR BLD: 55.5 % (ref 44–72)
NRBC # BLD AUTO: 0 K/UL
NRBC BLD-RTO: 0 /100 WBC
PLATELET # BLD AUTO: 176 K/UL (ref 164–446)
PMV BLD AUTO: 11.2 FL (ref 9–12.9)
RBC # BLD AUTO: 5.39 M/UL (ref 4.2–5.4)
TRIGL SERPL-MCNC: 113 MG/DL (ref 0–149)
TSH SERPL DL<=0.005 MIU/L-ACNC: 1.32 UIU/ML (ref 0.38–5.33)
WBC # BLD AUTO: 4.5 K/UL (ref 4.8–10.8)

## 2020-05-21 PROCEDURE — 82570 ASSAY OF URINE CREATININE: CPT

## 2020-05-21 PROCEDURE — 82043 UR ALBUMIN QUANTITATIVE: CPT

## 2020-05-21 PROCEDURE — 84443 ASSAY THYROID STIM HORMONE: CPT

## 2020-05-21 PROCEDURE — 85025 COMPLETE CBC W/AUTO DIFF WBC: CPT

## 2020-05-21 PROCEDURE — 83036 HEMOGLOBIN GLYCOSYLATED A1C: CPT | Mod: GA

## 2020-05-21 PROCEDURE — 36415 COLL VENOUS BLD VENIPUNCTURE: CPT

## 2020-05-21 PROCEDURE — 80061 LIPID PANEL: CPT

## 2020-05-27 ENCOUNTER — TELEMEDICINE (OUTPATIENT)
Dept: MEDICAL GROUP | Facility: PHYSICIAN GROUP | Age: 55
End: 2020-05-27
Payer: MEDICARE

## 2020-05-27 VITALS — HEIGHT: 68 IN | WEIGHT: 185 LBS | BODY MASS INDEX: 28.04 KG/M2 | TEMPERATURE: 98.6 F

## 2020-05-27 DIAGNOSIS — E03.9 ACQUIRED HYPOTHYROIDISM: ICD-10-CM

## 2020-05-27 DIAGNOSIS — G25.81 RESTLESS LEG SYNDROME: ICD-10-CM

## 2020-05-27 DIAGNOSIS — E78.2 MIXED HYPERLIPIDEMIA: ICD-10-CM

## 2020-05-27 DIAGNOSIS — R20.2 NUMBNESS AND TINGLING OF BOTH FEET: ICD-10-CM

## 2020-05-27 DIAGNOSIS — E11.9 TYPE 2 DIABETES MELLITUS WITHOUT COMPLICATION, WITHOUT LONG-TERM CURRENT USE OF INSULIN (HCC): ICD-10-CM

## 2020-05-27 DIAGNOSIS — R79.89 ELEVATED LFTS: ICD-10-CM

## 2020-05-27 DIAGNOSIS — R79.89 ABNORMAL CBC: ICD-10-CM

## 2020-05-27 DIAGNOSIS — G62.9 PERIPHERAL POLYNEUROPATHY: ICD-10-CM

## 2020-05-27 DIAGNOSIS — R20.0 NUMBNESS AND TINGLING OF BOTH FEET: ICD-10-CM

## 2020-05-27 DIAGNOSIS — B35.1 ONYCHOMYCOSIS: ICD-10-CM

## 2020-05-27 PROBLEM — L60.8 DEFORMITY OF TOENAIL: Status: RESOLVED | Noted: 2019-05-13 | Resolved: 2020-05-27

## 2020-05-27 PROCEDURE — 99214 OFFICE O/P EST MOD 30 MIN: CPT | Mod: 95,CR | Performed by: NURSE PRACTITIONER

## 2020-05-27 ASSESSMENT — FIBROSIS 4 INDEX: FIB4 SCORE: 1.17

## 2020-05-27 NOTE — ASSESSMENT & PLAN NOTE
Chronic, ongoing.  Patient reports that she first noticed nail changes in bilateral big toes approximately 1 year ago.  She has not received medication treatment for this issue.  She has tried multiple over-the-counter treatments as well as other remedies including essential oils and Vicks VapoRub.  Reports that her mother has significant fungal infections to all of her toenails and she is afraid of letting her's get that bad.  She does have a history of elevated LFTs as well as fatty liver disease. Due for updated CMP.

## 2020-05-27 NOTE — ASSESSMENT & PLAN NOTE
New problem to examiner.  Chronic problem for the patient that is intermittent.  Reports that recently her symptoms have been worse at night, and have become more frequent with her increase in exercise.  She is not taking medication for this issue.  Has been prescribed gabapentin in the past for neuropathy, states that she did not like the way the medication made her feel.

## 2020-05-27 NOTE — ASSESSMENT & PLAN NOTE
"Chronic, improving.  Currently taking atorvastatin 20 mg/day as directed.   Denies side effects of the medication--no myalgias or abdominal pain.  Reports diet is \" improving, was poor up until 3 weeks ago\".   She is not following a low-cholesterol diet.  She is exercising regularly.  Reports that she was fairly sedentary up until 3 weeks ago, recently started exercising regularly over the last 3 weeks.  She is not taking ASA daily.  She denies dizziness, claudication, or chest pain.  Due for updated lab work in May 2021.   3/26/2018 10:16 6/16/2018 10:00 12/28/2018 11:46 5/21/2020 09:34   Cholesterol,Tot 233 (H) 188 243 (H) 157   Triglycerides 186 (H) 142 203 (H) 113   HDL 46 36 (A) 48 43    (H) 124 (H) 154 (H) 91     "

## 2020-05-27 NOTE — ASSESSMENT & PLAN NOTE
Chronic, uncontrolled.  Continues Jardiance 25 mg/day.  Was previously prescribed metformin and metformin ER, but was unable to tolerate the medication due to GI side effects.  Most recent A1c completed on 5/21/2020 was increased to 7.9% from 7.1% in December 2019.  Reports that up until 3 weeks ago she had been fairly sedentary and her diet had been very poor.  States that she was very upset about the increase in her A1c and has been motivated to work on improving diet and exercise.  Has noticed that neuropathy in bilateral feet has progressively worsened and spread to mid arch when previously it was just in the toes, believes this may be directly related to uncontrolled blood sugars.   12/28/2018 11:46 5/13/2019 15:49 12/26/2019 14:31 5/21/2020 09:34   Glycohemoglobin 7.6 (H) 6.6 (A) 7.1 (A) 7.9 (H)   Estim. Avg Glu 171   180

## 2020-05-27 NOTE — ASSESSMENT & PLAN NOTE
"Chronic, ongoing.  Patient reports that she believes her bilateral foot neuropathy is progressing. Previously, and just affected her bilateral forefoot, but now she is having numbness and tingling in the arch of bilateral feet.  She has been prescribed gabapentin in the past, but states that it \"made her feel awful and drugged during the day\".  Is waiting to schedule nerve testing with specialist.  "

## 2020-05-27 NOTE — ASSESSMENT & PLAN NOTE
Chronic, ongoing.  Continues levothyroxine 100 mcg/day.  Most recent TSH within normal limits.  Patient reports that she continues to experience hair loss, struggles with her depression/bipolar, and fatigue.  Reports that the symptoms are similar to when she was diagnosed with hypothyroidism.   6/16/2018 10:00 5/7/2019 09:57 5/21/2020 09:34   TSH 1.520 0.880 1.320   Free T-4 1.06

## 2020-05-28 NOTE — ASSESSMENT & PLAN NOTE
New problem to examiner.  Chronic problem for the patient that is ongoing.  Reports that she has had elevated LFTs on and off for many years related to psychiatric medications.   5/18/2017 15:00 9/29/2017 10:25 3/26/2018 10:16 6/16/2018 10:00 12/28/2018 11:46 5/7/2019 09:58   AST(SGOT) 35 55 (H) 55 (H) 32 34 23   ALT(SGPT) 73 (H) 92 (H) 113 (H) 65 (H) 62 (H) 39

## 2020-05-28 NOTE — PROGRESS NOTES
"Telemedicine Visit: Established Patient     This encounter was conducted via Zoom .   Verbal consent was obtained. Patient's identity was verified.    Subjective:     Chief Complaint   Patient presents with   • Results     Lab review   • Nail Problem     bilateral big toes     Frances Delgado is a 54 y.o. female presenting for evaluation and management of:    Type 2 diabetes mellitus without complication, without long-term current use of insulin (HCC)  Chronic, uncontrolled.  Continues Jardiance 25 mg/day.  Was previously prescribed metformin and metformin ER, but was unable to tolerate the medication due to GI side effects.  Most recent A1c completed on 5/21/2020 was increased to 7.9% from 7.1% in December 2019.  Reports that up until 3 weeks ago she had been fairly sedentary and her diet had been very poor.  States that she was very upset about the increase in her A1c and has been motivated to work on improving diet and exercise.  Has noticed that neuropathy in bilateral feet has progressively worsened and spread to mid arch when previously it was just in the toes, believes this may be directly related to uncontrolled blood sugars.   12/28/2018 11:46 5/13/2019 15:49 12/26/2019 14:31 5/21/2020 09:34   Glycohemoglobin 7.6 (H) 6.6 (A) 7.1 (A) 7.9 (H)   Estim. Avg Glu 171   180       Mixed hyperlipidemia  Chronic, improving.  Currently taking atorvastatin 20 mg/day as directed.   Denies side effects of the medication--no myalgias or abdominal pain.  Reports diet is \" improving, was poor up until 3 weeks ago\".   She is not following a low-cholesterol diet.  She is exercising regularly.  Reports that she was fairly sedentary up until 3 weeks ago, recently started exercising regularly over the last 3 weeks.  She is not taking ASA daily.  She denies dizziness, claudication, or chest pain.  Due for updated lab work in May 2021.   3/26/2018 10:16 6/16/2018 10:00 12/28/2018 11:46 5/21/2020 09:34   Cholesterol,Tot 233 (H) " "188 243 (H) 157   Triglycerides 186 (H) 142 203 (H) 113   HDL 46 36 (A) 48 43    (H) 124 (H) 154 (H) 91       Peripheral polyneuropathy (HCC)  Chronic, ongoing.  Patient reports that she believes her bilateral foot neuropathy is progressing. Previously, and just affected her bilateral forefoot, but now she is having numbness and tingling in the arch of bilateral feet.  She has been prescribed gabapentin in the past, but states that it \"made her feel awful and drugged during the day\".  Is waiting to schedule nerve testing with specialist.    Restless leg syndrome  New problem to examiner.  Chronic problem for the patient that is intermittent.  Reports that recently her symptoms have been worse at night, and have become more frequent with her increase in exercise.  She is not taking medication for this issue.  Has been prescribed gabapentin in the past for neuropathy, states that she did not like the way the medication made her feel.    Acquired hypothyroidism  Chronic, ongoing.  Continues levothyroxine 100 mcg/day.  Most recent TSH within normal limits.  Patient reports that she continues to experience hair loss, struggles with her depression/bipolar, and fatigue.  Reports that the symptoms are similar to when she was diagnosed with hypothyroidism.   6/16/2018 10:00 5/7/2019 09:57 5/21/2020 09:34   TSH 1.520 0.880 1.320   Free T-4 1.06         Onychomycosis  Chronic, ongoing.  Patient reports that she first noticed nail changes in bilateral big toes approximately 1 year ago.  She has not received medication treatment for this issue.  She has tried multiple over-the-counter treatments as well as other remedies including essential oils and Vicks VapoRub.  Reports that her mother has significant fungal infections to all of her toenails and she is afraid of letting her's get that bad.  She does have a history of elevated LFTs as well as fatty liver disease. Due for updated CMP.        Elevated LFTs  New problem to " examiner.  Chronic problem for the patient that is ongoing.  Reports that she has had elevated LFTs on and off for many years related to psychiatric medications.   5/18/2017 15:00 9/29/2017 10:25 3/26/2018 10:16 6/16/2018 10:00 12/28/2018 11:46 5/7/2019 09:58   AST(SGOT) 35 55 (H) 55 (H) 32 34 23   ALT(SGPT) 73 (H) 92 (H) 113 (H) 65 (H) 62 (H) 39     ROS   Constitutional: + fatigue.  No fevers, chills, malaise.  Eyes: No eye pain.  ENT: No sore throat, congestion.   Resp: No cough, shortness of breath.  CV: No chest pain, leg swelling, palpitations.  GI: No nausea/vomiting, abdominal pain, constipation, diarrhea.  : No dysuria, hematuria.  MSK: No weakness.  Skin: + hair loss bilateral foot big toe nail color changes.  No rashes.  Neuro: + Restless leg syndrome and bilateral foot neuropathy.  No dizziness, weakness, headaches.  Psych: + Depression, no suicidal ideations.    All remaining systems reviewed and found to be negative, except as stated above.     Allergies   Allergen Reactions   • Lisinopril Cough     Current medicines (including changes today)  Current Outpatient Medications   Medication Sig Dispense Refill   • Empagliflozin (JARDIANCE) 25 MG Tab Take 1 Tab by mouth every day. 90 Tab 0   • atorvastatin (LIPITOR) 20 MG Tab TAKE 1 TABLET BY MOUTH  EVERY DAY 90 Tab 0   • omeprazole (PRILOSEC) 40 MG delayed-release capsule Take 1 Cap by mouth every day. 90 Cap 3   • levothyroxine (SYNTHROID) 100 MCG Tab Take 1 Tab by mouth Every morning on an empty stomach. 90 Tab 1   • modafinil (PROVIGIL) 100 MG Tab Take 100 mg by mouth.     • lamotrigine (LAMICTAL) 200 MG tablet Take 200 mg by mouth every day.     • sumatriptan (IMITREX) 100 MG tablet Take 1 Tab by mouth Once PRN. 10 Tab 11   • Blood Glucose Test Strips Test strips order: Test strips for George Contour Next meter. Sig: use once per day #100 RF x 3 100 Strip 3   • traZODone (DESYREL) 100 MG Tab TAKE 1/2 TO 1 TABLET BY  MOUTH AT BEDTIME AS NEEDED  FOR  SLEEP 90 Tab 0     No current facility-administered medications for this visit.      Patient Active Problem List    Diagnosis Date Noted   • Restless leg syndrome 05/27/2020   • Onychomycosis 05/27/2020   • Right leg numbness 12/26/2019   • Lipoma of torso 07/29/2019   • Right hand pain 05/13/2019   • Type 2 diabetes mellitus without complication, without long-term current use of insulin (HCC) 01/02/2019   • Peripheral polyneuropathy 01/02/2019   • History of smoking at least 1 pack per day for at least 30 years 01/02/2019   • Chronic toe pain, bilateral 06/21/2018   • Acquired hypothyroidism 03/29/2018   • Gastroesophageal reflux disease without esophagitis 03/29/2018   • DORENE (generalized anxiety disorder) 12/06/2017   • Ganglion cyst 10/04/2017   • Elevated LFTs 05/23/2017   • Chronic bilateral low back pain without sciatica 05/23/2017   • Mixed hyperlipidemia 05/09/2017   • Essential hypertension 05/09/2017   • Bipolar II disorder (HCC) 12/21/2016   • Attention deficit hyperactivity disorder (ADHD) 12/21/2016     Family History   Problem Relation Age of Onset   • Bipolar disorder Father    • Alcohol abuse Father         ETOH   • Other Father         Cirrhosis   • Diabetes Mother    • Hypertension Mother    • Other Mother         gilberts syndrome   • Alcohol abuse Sister    • Psychiatric Illness Sister    • Alcohol abuse Maternal Grandmother    • Other Maternal Grandmother         Brain Tumor   • Diabetes Maternal Grandfather    • Alzheimer's Disease Maternal Grandfather    • Cancer Maternal Grandfather         Skin   • Cancer Maternal Aunt         stomach   • Diabetes Maternal Uncle    • Alcohol abuse Maternal Uncle    • Stroke Maternal Uncle    • Diabetes Sister    • Hyperlipidemia Sister    • Anxiety disorder Sister    • Other Sister         benign uterine tumor   • Obesity Sister    • Obesity Sister    • Alcohol/Drug Maternal Uncle         prescription narcotics   • Cancer Maternal Uncle      She  has a past  "medical history of ADD (attention deficit disorder), Bipolar 1 disorder (HCC), Bipolar disorder (HCC), Cervical cancer (HCC) (1984), Chronic back pain, Depression, Diabetes (HCC), Insomnia, Memory loss due to medical condition, Migraine, and Thyroid disease.  She  has a past surgical history that includes primary c section; pr enlarge breast with implant (2004); toe arthroplasty (Right); and abdominal hysterectomy total (2004).     Objective:   Temp 37 °C (98.6 °F) (Temporal)   Ht 1.727 m (5' 8\")   Wt 83.9 kg (185 lb)   BMI 28.13 kg/m²     Physical Exam:  Constitutional: Alert, no distress, well-groomed.  Skin: No rashes in visible areas.  Eye: Round. Conjunctiva clear, lids normal. No icterus.   ENMT: Lips pink without lesions, good dentition, moist mucous membranes. Phonation normal.  Neck: No masses, no thyromegaly. Moves freely without pain.  Respiratory: Unlabored respiratory effort, no cough or audible wheeze  Psych: Alert and oriented x3, normal affect and mood.     Assessment and Plan:   The following treatment plan was discussed:     1. Type 2 diabetes mellitus without complication, without long-term current use of insulin ([facility])  Chronic, uncontrolled.  Continue Jardiance 25 mg/day.  Continue working on improving diet, exercise, weight loss.  Plan to repeat A1c in 3 months.  - Comp Metabolic  Panel; Future    2. Acquired hypothyroidism  Chronic, stable.  Continue levothyroxine 100 mcg/day, does not need refill at this time.  Due to repeat labs in May 2021.    3. Mixed hyperlipidemia  Chronic, improving.  Continue atorvastatin 20 mg/day, does not need a refill at this time.  Due to repeat lipid profile in May 2021.    4. Onychomycosis  5. Elevated LFTs  Chronic, ongoing.  Patient is concerned with bilateral foot great toe nail changes.  Plan to update CMP to evaluate LFTs, history of chronically elevated LFTs.  If LFTs are stable, patient is interested in trying terbinafine for this issue.  If LFTs remain " elevated, patient agrees to referral to podiatry for evaluation and treatment of this problem.  - Comp Metabolic  Panel; Future    6. Peripheral polyneuropathy  7. Restless leg syndrome  8. Numbness and tingling of both feet  9. Abnormal CBC  Chronic, ongoing.  Continue follow-up with physiatry for further work-up and imaging.  Plan to check the following labs.  Will notify patient through my chart of results.  - IRON/TOTAL IRON BIND; Future  - VITAMIN B12; Future  - VITAMIN D,25 HYDROXY; Future  - FOLATE; Future  - Comp Metabolic Panel; Future    Follow-up: Return in about 3 months (around 8/27/2020) for Diabetes, Follow up Labs, As needed.         Please note that this dictation was created using voice recognition software. I have worked with consultants from the vendor as well as technical experts from [employer] to optimize the interface. I have made every reasonable attempt to correct obvious errors, but I expect that there are errors of grammar and possibly content that I did not discover before finalizing the note.

## 2020-06-08 ENCOUNTER — HOSPITAL ENCOUNTER (OUTPATIENT)
Dept: LAB | Facility: MEDICAL CENTER | Age: 55
End: 2020-06-08
Attending: NURSE PRACTITIONER
Payer: MEDICARE

## 2020-06-08 DIAGNOSIS — R79.89 ELEVATED LFTS: ICD-10-CM

## 2020-06-08 DIAGNOSIS — G25.81 RESTLESS LEG SYNDROME: ICD-10-CM

## 2020-06-08 DIAGNOSIS — G62.9 PERIPHERAL POLYNEUROPATHY: ICD-10-CM

## 2020-06-08 DIAGNOSIS — E11.9 TYPE 2 DIABETES MELLITUS WITHOUT COMPLICATION, WITHOUT LONG-TERM CURRENT USE OF INSULIN (HCC): ICD-10-CM

## 2020-06-08 DIAGNOSIS — G89.29 CHRONIC BILATERAL LOW BACK PAIN WITHOUT SCIATICA: ICD-10-CM

## 2020-06-08 DIAGNOSIS — R20.0 NUMBNESS AND TINGLING OF BOTH FEET: ICD-10-CM

## 2020-06-08 DIAGNOSIS — M54.50 CHRONIC BILATERAL LOW BACK PAIN WITHOUT SCIATICA: ICD-10-CM

## 2020-06-08 DIAGNOSIS — R29.898 RIGHT LEG WEAKNESS: ICD-10-CM

## 2020-06-08 DIAGNOSIS — R20.2 NUMBNESS AND TINGLING OF BOTH FEET: ICD-10-CM

## 2020-06-08 DIAGNOSIS — R79.89 ABNORMAL CBC: ICD-10-CM

## 2020-06-08 DIAGNOSIS — R20.0 RIGHT LEG NUMBNESS: ICD-10-CM

## 2020-06-08 PROCEDURE — 83550 IRON BINDING TEST: CPT

## 2020-06-08 PROCEDURE — 36415 COLL VENOUS BLD VENIPUNCTURE: CPT

## 2020-06-08 PROCEDURE — 82607 VITAMIN B-12: CPT

## 2020-06-08 PROCEDURE — 82746 ASSAY OF FOLIC ACID SERUM: CPT

## 2020-06-08 PROCEDURE — 83540 ASSAY OF IRON: CPT

## 2020-06-08 PROCEDURE — 80053 COMPREHEN METABOLIC PANEL: CPT

## 2020-06-09 LAB
ALBUMIN SERPL BCP-MCNC: 5.2 G/DL (ref 3.2–4.9)
ALBUMIN/GLOB SERPL: 2.5 G/DL
ALP SERPL-CCNC: 115 U/L (ref 30–99)
ALT SERPL-CCNC: 105 U/L (ref 2–50)
ANION GAP SERPL CALC-SCNC: 14 MMOL/L (ref 7–16)
AST SERPL-CCNC: 52 U/L (ref 12–45)
BILIRUB SERPL-MCNC: 0.5 MG/DL (ref 0.1–1.5)
BUN SERPL-MCNC: 12 MG/DL (ref 8–22)
CALCIUM SERPL-MCNC: 10.1 MG/DL (ref 8.5–10.5)
CHLORIDE SERPL-SCNC: 98 MMOL/L (ref 96–112)
CO2 SERPL-SCNC: 27 MMOL/L (ref 20–33)
CREAT SERPL-MCNC: 0.65 MG/DL (ref 0.5–1.4)
FOLATE SERPL-MCNC: 15.6 NG/ML
GLOBULIN SER CALC-MCNC: 2.1 G/DL (ref 1.9–3.5)
GLUCOSE SERPL-MCNC: 120 MG/DL (ref 65–99)
IRON SATN MFR SERPL: 26 % (ref 15–55)
IRON SERPL-MCNC: 89 UG/DL (ref 40–170)
POTASSIUM SERPL-SCNC: 3.6 MMOL/L (ref 3.6–5.5)
PROT SERPL-MCNC: 7.3 G/DL (ref 6–8.2)
SODIUM SERPL-SCNC: 139 MMOL/L (ref 135–145)
TIBC SERPL-MCNC: 343 UG/DL (ref 250–450)
UIBC SERPL-MCNC: 254 UG/DL (ref 110–370)
VIT B12 SERPL-MCNC: 672 PG/ML (ref 211–911)

## 2020-06-10 ENCOUNTER — TELEPHONE (OUTPATIENT)
Dept: MEDICAL GROUP | Facility: PHYSICIAN GROUP | Age: 55
End: 2020-06-10

## 2020-06-10 DIAGNOSIS — B35.1 ONYCHOMYCOSIS: ICD-10-CM

## 2020-06-10 DIAGNOSIS — R79.89 ELEVATED LFTS: ICD-10-CM

## 2020-06-10 NOTE — TELEPHONE ENCOUNTER
1. Caller Name:Frances Delgado                          Call Back Number: [phone]        How would the patient prefer to be contacted with a response: MyChart message    Patient is requesting a referral to podiatry

## 2020-06-10 NOTE — TELEPHONE ENCOUNTER
----- Message from Frances Delgado sent at 6/10/2020  3:29 PM PDT -----  Regarding: Non-Urgent Medical Question  Contact: [phone]  Alan Alegre, got your message about labs...disappointing for sure. Yes, I would like a referral to podiatry. Thank you!

## 2020-07-06 DIAGNOSIS — E11.9 TYPE 2 DIABETES MELLITUS WITHOUT COMPLICATION, WITHOUT LONG-TERM CURRENT USE OF INSULIN (HCC): ICD-10-CM

## 2020-07-07 NOTE — PROGRESS NOTES
1. Type 2 diabetes mellitus without complication, without long-term current use of insulin (HCC)  Requesting referral to diabetes education.  - REFERRAL TO DIABETIC EDUCATION Diabetes Self Management Education / Training (DSME/T) and Medical Nutrition Therapy (MNT): Initial Group DSME/MNT as authorized by payor, Follow-Up DSME/MNT as authorized by payor; DSME/T Content: Monitoring Diabete...

## 2020-07-25 DIAGNOSIS — E78.2 MIXED HYPERLIPIDEMIA: ICD-10-CM

## 2020-07-27 RX ORDER — ATORVASTATIN CALCIUM 20 MG/1
20 TABLET, FILM COATED ORAL
Qty: 90 TAB | Refills: 3 | Status: SHIPPED | OUTPATIENT
Start: 2020-07-27 | End: 2021-03-15 | Stop reason: SINTOL

## 2020-07-27 NOTE — TELEPHONE ENCOUNTER
Received request via: Pharmacy    Was the patient seen in the last year in this department? Yes 5/27/20    Does the patient have an active prescription (recently filled or refills available) for medication(s) requested? No

## 2020-07-27 NOTE — TELEPHONE ENCOUNTER
Requested Prescriptions     Pending Prescriptions Disp Refills   • atorvastatin (LIPITOR) 20 MG Tab [Pharmacy Med Name: ATORVASTATIN  20MG  TAB] 90 Tab 3     Sig: Take 1 Tab by mouth every bedtime.       DANIEL Gallegos.

## 2020-07-31 ENCOUNTER — PATIENT MESSAGE (OUTPATIENT)
Dept: MEDICAL GROUP | Facility: PHYSICIAN GROUP | Age: 55
End: 2020-07-31

## 2020-08-07 DIAGNOSIS — E11.9 TYPE 2 DIABETES MELLITUS WITHOUT COMPLICATION, WITHOUT LONG-TERM CURRENT USE OF INSULIN (HCC): Chronic | ICD-10-CM

## 2020-08-10 RX ORDER — EMPAGLIFLOZIN 25 MG/1
TABLET, FILM COATED ORAL
Qty: 90 TAB | Refills: 0 | Status: SHIPPED | OUTPATIENT
Start: 2020-08-10 | End: 2020-10-28

## 2020-08-10 NOTE — TELEPHONE ENCOUNTER
Received request via: Pharmacy    Was the patient seen in the last year in this department? Yes LOV 05/27/2020    Does the patient have an active prescription (recently filled or refills available) for medication(s) requested? No

## 2020-09-01 DIAGNOSIS — E11.9 TYPE 2 DIABETES MELLITUS WITHOUT COMPLICATION, WITHOUT LONG-TERM CURRENT USE OF INSULIN (HCC): Chronic | ICD-10-CM

## 2020-09-02 NOTE — TELEPHONE ENCOUNTER
Requested Prescriptions     Pending Prescriptions Disp Refills   • glucose blood (CONTOUR NEXT TEST) strip [Pharmacy Med Name: CONTOUR NEXT TEST STRIP] 100 Strip 3     Si Strip by Other route every day. Use to test blood sugar 1 time daily       DANIEL Gallegos.

## 2020-09-27 DIAGNOSIS — E03.9 ACQUIRED HYPOTHYROIDISM: ICD-10-CM

## 2020-09-28 RX ORDER — LEVOTHYROXINE SODIUM 0.1 MG/1
TABLET ORAL
Qty: 90 TAB | Refills: 2 | Status: SHIPPED | OUTPATIENT
Start: 2020-09-28 | End: 2021-06-01

## 2020-09-28 NOTE — TELEPHONE ENCOUNTER
Requested Prescriptions     Pending Prescriptions Disp Refills   • levothyroxine (SYNTHROID) 100 MCG Tab [Pharmacy Med Name: LEVOTHYROXINE  100MCG  TAB] 90 Tab 2     Sig: TAKE 1 TABLET BY MOUTH IN  THE MORNING ON AN EMPTY  STOMACH       DANIEL Gallegos.

## 2020-10-27 DIAGNOSIS — E11.9 TYPE 2 DIABETES MELLITUS WITHOUT COMPLICATION, WITHOUT LONG-TERM CURRENT USE OF INSULIN (HCC): Chronic | ICD-10-CM

## 2020-10-28 RX ORDER — EMPAGLIFLOZIN 25 MG/1
TABLET, FILM COATED ORAL
Qty: 90 TAB | Refills: 3 | Status: SHIPPED | OUTPATIENT
Start: 2020-10-28 | End: 2021-09-27

## 2020-10-28 NOTE — TELEPHONE ENCOUNTER
Requested Prescriptions     Pending Prescriptions Disp Refills   • JARDIANCE 25 MG Tab [Pharmacy Med Name: JARDIANCE  25MG  TAB] 90 Tab 3     Sig: TAKE 1 TABLET BY MOUTH  DAILY       DANIEL Gallegos.

## 2020-11-12 ENCOUNTER — OFFICE VISIT (OUTPATIENT)
Dept: PHYSICAL MEDICINE AND REHAB | Facility: MEDICAL CENTER | Age: 55
End: 2020-11-12
Payer: MEDICARE

## 2020-11-12 VITALS
DIASTOLIC BLOOD PRESSURE: 84 MMHG | SYSTOLIC BLOOD PRESSURE: 122 MMHG | HEIGHT: 68 IN | HEART RATE: 93 BPM | WEIGHT: 165.12 LBS | BODY MASS INDEX: 25.03 KG/M2 | TEMPERATURE: 97.3 F | OXYGEN SATURATION: 92 %

## 2020-11-12 DIAGNOSIS — G54.1 LUMBOSACRAL PLEXOPATHY: ICD-10-CM

## 2020-11-12 DIAGNOSIS — R29.898 RIGHT LEG WEAKNESS: ICD-10-CM

## 2020-11-12 DIAGNOSIS — R20.0 RIGHT LEG NUMBNESS: ICD-10-CM

## 2020-11-12 PROCEDURE — 95909 NRV CNDJ TST 5-6 STUDIES: CPT | Performed by: PHYSICAL MEDICINE & REHABILITATION

## 2020-11-12 PROCEDURE — 95886 MUSC TEST DONE W/N TEST COMP: CPT | Performed by: PHYSICAL MEDICINE & REHABILITATION

## 2020-11-12 ASSESSMENT — PAIN SCALES - GENERAL: PAINLEVEL: NO PAIN

## 2020-11-12 ASSESSMENT — FIBROSIS 4 INDEX: FIB4 SCORE: 1.64
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
## 2020-12-03 ENCOUNTER — APPOINTMENT (OUTPATIENT)
Dept: PHYSICAL MEDICINE AND REHAB | Facility: MEDICAL CENTER | Age: 55
End: 2020-12-03
Payer: MEDICARE

## 2021-01-05 ENCOUNTER — APPOINTMENT (OUTPATIENT)
Dept: PHYSICAL MEDICINE AND REHAB | Facility: MEDICAL CENTER | Age: 56
End: 2021-01-05
Payer: MEDICARE

## 2021-01-05 ENCOUNTER — NURSE TRIAGE (OUTPATIENT)
Dept: HEALTH INFORMATION MANAGEMENT | Facility: OTHER | Age: 56
End: 2021-01-05

## 2021-01-05 NOTE — TELEPHONE ENCOUNTER
Regarding: PT HAS SYMPTOMS, NOT SURE IF EXPOSED TO COVID 19  ----- Message from Sue Gonzalez sent at 1/5/2021 11:51 AM PST -----  ##PT HAS A COUGH, FATIGUE, MUSCLE & BODY ACHES, HEADACHE, SORE THROAT, CONGESTION, DIARRHEA, NOT SURE IF PT WAS EXPOSED TO COVID 19, PT WILL SCHEDULE APPT AT LEAST 2 WEEKS OUT AND GET SCREENED BY THE [facility] NURSE TRIAGE, 01-##-TR

## 2021-01-05 NOTE — TELEPHONE ENCOUNTER
"Pt stated she did not need to talk to me. Asked if she had any questions or concerns. Pt stated that she did not.     Reason for Disposition  • Information only question and nurse able to answer    Additional Information  • Negative: Nursing judgment  • Negative: Nursing judgment  • Negative: Nursing judgment  • Negative: Nursing judgment    Answer Assessment - Initial Assessment Questions  1. REASON FOR CALL or QUESTION: \"What is your reason for calling today?\" or \"How can I best help you?\" or \"What question do you have that I can help answer?\"      Pt does not have any questions.    Protocols used: INFORMATION ONLY CALL - NO TRIAGE-A-[place]      "

## 2021-01-27 ENCOUNTER — OFFICE VISIT (OUTPATIENT)
Dept: PHYSICAL MEDICINE AND REHAB | Facility: MEDICAL CENTER | Age: 56
End: 2021-01-27
Payer: MEDICARE

## 2021-01-27 VITALS
HEART RATE: 94 BPM | WEIGHT: 172.84 LBS | TEMPERATURE: 96.4 F | BODY MASS INDEX: 26.2 KG/M2 | SYSTOLIC BLOOD PRESSURE: 122 MMHG | OXYGEN SATURATION: 91 % | DIASTOLIC BLOOD PRESSURE: 80 MMHG | HEIGHT: 68 IN | RESPIRATION RATE: 16 BRPM

## 2021-01-27 DIAGNOSIS — R26.9 ABNORMAL GAIT: ICD-10-CM

## 2021-01-27 DIAGNOSIS — G62.9 PERIPHERAL POLYNEUROPATHY: ICD-10-CM

## 2021-01-27 DIAGNOSIS — G54.1 LUMBOSACRAL PLEXOPATHY: ICD-10-CM

## 2021-01-27 DIAGNOSIS — R29.898 RIGHT LEG WEAKNESS: ICD-10-CM

## 2021-01-27 DIAGNOSIS — E11.9 TYPE 2 DIABETES MELLITUS WITHOUT COMPLICATION, WITHOUT LONG-TERM CURRENT USE OF INSULIN (HCC): ICD-10-CM

## 2021-01-27 DIAGNOSIS — R20.0 RIGHT LEG NUMBNESS: ICD-10-CM

## 2021-01-27 PROCEDURE — 99214 OFFICE O/P EST MOD 30 MIN: CPT | Performed by: PHYSICAL MEDICINE & REHABILITATION

## 2021-01-27 ASSESSMENT — PATIENT HEALTH QUESTIONNAIRE - PHQ9
SUM OF ALL RESPONSES TO PHQ QUESTIONS 1-9: 7
5. POOR APPETITE OR OVEREATING: 1 - SEVERAL DAYS
CLINICAL INTERPRETATION OF PHQ2 SCORE: 2

## 2021-01-27 ASSESSMENT — FIBROSIS 4 INDEX: FIB4 SCORE: 1.64

## 2021-01-27 ASSESSMENT — PAIN SCALES - GENERAL: PAINLEVEL: NO PAIN

## 2021-02-01 NOTE — PROGRESS NOTES
Follow up patient note  Interventional spine and sports physiatry, Physical medicine rehabilitation      Chief complaint:   Chief Complaint   Patient presents with   • Follow-Up     post EMG          HISTORY    Please see new patient note by Dr Ly,  for more details.     HPI  Patient identification: Frances Delgado ,  1965,   With Diagnoses of Lumbosacral plexopathy, Right leg numbness, Right leg weakness, Peripheral polyneuropathy, Type 2 diabetes mellitus without complication, without long-term current use of insulin (HCC), and Abnormal gait were pertinent to this visit.       -Chronic pain and weakness radiating down the right leg.  6 out of 10 in intensity.  Intermittent.  Associated numbness.  Denies recent injuries or trauma.  No back pain.        ROS Red Flags :   Fever, Chills, Sweats: Denies  Involuntary Weight Loss: Denies  Bowel/Bladder Incontinence: Denies  Saddle Anesthesia: Denies        PMHx:   Past Medical History:   Diagnosis Date   • ADD (attention deficit disorder)    • Bipolar 1 disorder ([facility])    • Bipolar disorder ([facility])    • Cervical cancer ([facility])    • Chronic back pain    • Depression    • Diabetes ([facility])    • Insomnia    • Memory loss due to medical condition     Related to ECT therapy for depression   • Migraine    • Thyroid disease     hypo       PSHx:   Past Surgical History:   Procedure Laterality Date   • [place] BREAST AUGMENTATION WITH IMPLANT     • ABDOMINAL HYSTERECTOMY TOTAL      total   • PRIMARY C SECTION     • TOE ARTHROPLASTY Right     cut tendons, fused joints. last surgery removed last joint of all toes except for big toe       Family history   Family History   Problem Relation Age of Onset   • Bipolar disorder Father    • Alcohol abuse Father         ETOH   • Other Father         Cirrhosis   • Diabetes Mother    • Hypertension Mother    • Other Mother         gilberts syndrome   • Alcohol abuse Sister    • Psychiatric Illness Sister    • Alcohol abuse  Maternal Grandmother    • Other Maternal Grandmother         Brain Tumor   • Diabetes Maternal Grandfather    • Alzheimer's Disease Maternal Grandfather    • Cancer Maternal Grandfather         Skin   • Cancer Maternal Aunt         stomach   • Diabetes Maternal Uncle    • Alcohol abuse Maternal Uncle    • Stroke Maternal Uncle    • Diabetes Sister    • Hyperlipidemia Sister    • Anxiety disorder Sister    • Other Sister         benign uterine tumor   • Obesity Sister    • Obesity Sister    • Alcohol/Drug Maternal Uncle         prescription narcotics   • Cancer Maternal Uncle          Medications:   Outpatient Medications Marked as Taking for the 1/27/21 encounter (Office Visit) with Anton Ly M.D.   Medication Sig Dispense Refill   • JARDIANCE 25 MG Tab TAKE 1 TABLET BY MOUTH  DAILY 90 Tab 3   • levothyroxine (SYNTHROID) 100 MCG Tab TAKE 1 TABLET BY MOUTH IN  THE MORNING ON AN EMPTY  STOMACH 90 Tab 2   • glucose blood (CONTOUR NEXT TEST) strip 1 Strip by Other route every day. Use to test blood sugar 1 time daily 100 Strip 3   • atorvastatin (LIPITOR) 20 MG Tab Take 1 Tab by mouth every bedtime. 90 Tab 3   • omeprazole (PRILOSEC) 40 MG delayed-release capsule Take 1 Cap by mouth every day. 90 Cap 3   • modafinil (PROVIGIL) 100 MG Tab Take 100 mg by mouth.     • lamotrigine (LAMICTAL) 200 MG tablet Take 200 mg by mouth every day.     • sumatriptan (IMITREX) 100 MG tablet Take 1 Tab by mouth Once PRN. 10 Tab 11   • traZODone (DESYREL) 100 MG Tab TAKE 1/2 TO 1 TABLET BY  MOUTH AT BEDTIME AS NEEDED  FOR SLEEP 90 Tab 0        Current Outpatient Medications on File Prior to Visit   Medication Sig Dispense Refill   • JARDIANCE 25 MG Tab TAKE 1 TABLET BY MOUTH  DAILY 90 Tab 3   • levothyroxine (SYNTHROID) 100 MCG Tab TAKE 1 TABLET BY MOUTH IN  THE MORNING ON AN EMPTY  STOMACH 90 Tab 2   • glucose blood (CONTOUR NEXT TEST) strip 1 Strip by Other route every day. Use to test blood sugar 1 time daily 100 Strip 3   •  atorvastatin (LIPITOR) 20 MG Tab Take 1 Tab by mouth every bedtime. 90 Tab 3   • omeprazole (PRILOSEC) 40 MG delayed-release capsule Take 1 Cap by mouth every day. 90 Cap 3   • modafinil (PROVIGIL) 100 MG Tab Take 100 mg by mouth.     • lamotrigine (LAMICTAL) 200 MG tablet Take 200 mg by mouth every day.     • sumatriptan (IMITREX) 100 MG tablet Take 1 Tab by mouth Once PRN. 10 Tab 11   • traZODone (DESYREL) 100 MG Tab TAKE 1/2 TO 1 TABLET BY  MOUTH AT BEDTIME AS NEEDED  FOR SLEEP 90 Tab 0     No current facility-administered medications on file prior to visit.          Allergies:   Allergies   Allergen Reactions   • Lisinopril Cough       Social Hx:   Social History     Socioeconomic History   • Marital status: Single     Spouse name: Not on file   • Number of children: Not on file   • Years of education: Not on file   • Highest education level: Not on file   Occupational History   • Not on file   Social Needs   • Financial resource strain: Not on file   • Food insecurity     Worry: Not on file     Inability: Not on file   • Transportation needs     Medical: Not on file     Non-medical: Not on file   Tobacco Use   • Smoking status: Former Smoker     Packs/day: 1.00     Years: 38.00     Pack years: 38.00     Types: Cigarettes     Start date: 1978     Quit date: 2016     Years since quittin.4   • Smokeless tobacco: Never Used   Substance and Sexual Activity   • Alcohol use: Yes     Comment: holidays   • Drug use: No   • Sexual activity: Yes     Partners: Male     Birth control/protection: Surgical     Comment:  1.5 years   Lifestyle   • Physical activity     Days per week: Not on file     Minutes per session: Not on file   • Stress: Not on file   Relationships   • Social connections     Talks on phone: Not on file     Gets together: Not on file     Attends L[employer] service: Not on file     Active member of club or organization: Not on file     Attends meetings of clubs or organizations: Not on  "file     Relationship status: Not on file   • Intimate partner violence     Fear of current or ex partner: Not on file     Emotionally abused: Not on file     Physically abused: Not on file     Forced sexual activity: Not on file   Other Topics Concern   •  Service No   • Blood Transfusions No   • Caffeine Concern No   • Occupational Exposure No   • Hobby Hazards No   • Sleep Concern Yes   • Stress Concern No   • Weight Concern No   • Special Diet No   • Back Care No   • Exercise Yes   • Bike Helmet No   • Seat Belt Yes   • Self-Exams Yes   Social History Narrative   • Not on file         EXAMINATION     Physical Exam:   Vitals: /80 (BP Location: Right arm, Patient Position: Sitting, BP Cuff Size: Adult)   Pulse 94   Temp (!) 35.8 °C (96.4 °F) (Temporal)   Resp 16   Ht 1.727 m (5' 8\")   Wt 78.4 kg (172 lb 13.5 oz)   SpO2 91%     Constitutional:   Body Habitus: Body mass index is 26.28 kg/m².  Cooperation: Fully cooperates with exam  Appearance: Well-groomed no disheveled    Respiratory-  breathing comfortable on room air, no audible wheezing  Cardiovascular- capillary refills less than 2 seconds. No lower extremity edema is noted.   Psychiatric- alert and oriented ×3. Normal affect.    MSK and Neuro: -       Key points for the international standards for neurological classification of spinal cord injury (ISNCSCI) to light touch.      Dermatome R L                                                           L2 2 2   L3 1 2   L4 2 2   L5 2 2   S1 2 2   S2 2 2         Motor Exam Lower Extremities     ? Myotome R L   Hip flexion L2 5 5   Knee extension L3 3 5   Ankle dorsiflexion L4 5 5   Toe extension L5 5 5   Ankle plantarflexion S1 5 5               MEDICAL DECISION MAKING    DATA    Labs:   Lab Results   Component Value Date/Time    SODIUM 139 06/08/2020 02:33 PM    POTASSIUM 3.6 06/08/2020 02:33 PM    CHLORIDE 98 06/08/2020 02:33 PM    CO2 27 06/08/2020 02:33 PM    GLUCOSE 120 (H) 06/08/2020 02:33 " PM    BUN 12 06/08/2020 02:33 PM    CREATININE 0.65 06/08/2020 02:33 PM        No results found for: PROTHROMBTM, INR     Lab Results   Component Value Date/Time    WBC 4.5 (L) 05/21/2020 09:34 AM    RBC 5.39 05/21/2020 09:34 AM    HEMOGLOBIN 16.1 (H) 05/21/2020 09:34 AM    HEMATOCRIT 49.3 (H) 05/21/2020 09:34 AM    MCV 91.5 05/21/2020 09:34 AM    MCH 29.9 05/21/2020 09:34 AM    MCHC 32.7 (L) 05/21/2020 09:34 AM    MPV 11.2 05/21/2020 09:34 AM    NEUTSPOLYS 55.50 05/21/2020 09:34 AM    LYMPHOCYTES 31.00 05/21/2020 09:34 AM    MONOCYTES 7.30 05/21/2020 09:34 AM    EOSINOPHILS 4.90 05/21/2020 09:34 AM    BASOPHILS 1.10 05/21/2020 09:34 AM        Lab Results   Component Value Date/Time    HBA1C 7.9 (H) 05/21/2020 09:34 AM          Imaging:   I personally reviewed following images    I personally reviewed following images, these are my reads  X-ray hips 10/22/2015  No acute abnormalities.  No significant degenerative changes.       I reviewed the following radiology reports                                                                                                                                                                                                                                  Results for orders placed during the hospital encounter of 05/13/19   DX-HAND 3+ RIGHT    Impression Negative RIGHT hand series.            Results for orders placed in visit on 10/22/15   DX-HIPS-COMPLETE - BILATERAL 3+    Impression 1.  Pelvis/hip series within normal limits    2.  Facet arthropathy of the lumbar spine                                       Electrodiagnostics              I reviewed the following report from Dr. Didier Pa from the EMG/nerve conduction study from 11/12/2020  Impression/Recommendations:  Abnormal study  1. Today's electrodiagnostic abnormalities are difficult to interpret.  There are abnormalities in the right femoral nerve study, decreased amplitude of the right tibial motor, and absent right  fibular motor nerve conduction study without evidence of ongoing axon loss on needle exam of the right lower extremity.  These findings could suggest remote lumbosacral plexopathy.  2. Today's electrodiagnostic findings point against:      A. Right sural neuropathy      B. Acute right lumbosacral radiculopathy  3. Clinically, she may have remote right lumbosacral plexopathy.  It is difficult to interpret today's symptoms, although, given the amount of time since onset of symptoms, it is possible that it is more difficult to interpret electrodiagnostically.  Further electrodiagnostic evaluation could be considered to assess for peripheral neuropathy, which was not ruled out today and could be a comorbid finding.  Consider further diagnostic evaluation if symptoms do not continue to improve.    DIAGNOSIS   Visit Diagnoses     ICD-10-CM   1. Lumbosacral plexopathy  G54.1   2. Right leg numbness  R20.0   3. Right leg weakness  R29.898   4. Peripheral polyneuropathy  G62.9   5. Type 2 diabetes mellitus without complication, without long-term current use of insulin (HCC)  E11.9   6. Abnormal gait  R26.9         ASSESSMENT and PLAN:     Frances Delgado  1965 female      Frances was seen today for follow-up.    Diagnoses and all orders for this visit:    Lumbosacral plexopathy  -     REFERRAL TO PHYSIATRY (PMR)  -     REFERRAL TO NEUROLOGY    Right leg numbness  -     REFERRAL TO PHYSIATRY (PMR)  -     REFERRAL TO NEUROLOGY    Right leg weakness  -     REFERRAL TO PHYSIATRY (PMR)  -     REFERRAL TO NEUROLOGY    Peripheral polyneuropathy  -     REFERRAL TO PHYSIATRY (PMR)  -     REFERRAL TO NEUROLOGY    Type 2 diabetes mellitus without complication, without long-term current use of insulin (HCC)  -     REFERRAL TO PHYSIATRY (PMR)  -     REFERRAL TO NEUROLOGY    Abnormal gait  -     REFERRAL TO PHYSIATRY (PMR)  -     REFERRAL TO NEUROLOGY        The patient has significant functional deficits with lower extremity  weakness and numbness.  EMG showed possible remote lumbosacral plexopathy with possible peripheral neuropathy and no signs of lumbar radiculopathy.  Given the lack of back pain additional spine imaging was not ordered.  I referred the patient to neuro rehab physiatry as well as neurology for consults.    Gabapentin 300 mg 3 times daily could be considered but I would prefer this to come from the patient's PCP given the patient's psychiatric history.  Cymbalta could also be considered by the patient's PCP/psychiatrist.    There are no signs of a lumbar radiculopathy    Follow up: As needed with me    Thank you for allowing me to participate in the care of this patient. If you have any questions please not hesitate to contact me.             Please note that this dictation was created using voice recognition software. I have made every reasonable attempt to correct obvious errors but there may be errors of grammar and content that I may have overlooked prior to finalization of this note.      Anton Ly MD  Interventional Spine and Sports Physiatry  Physical Medicine and Rehabilitation  [facility]

## 2021-02-02 ENCOUNTER — OFFICE VISIT (OUTPATIENT)
Dept: NEUROLOGY | Facility: MEDICAL CENTER | Age: 56
End: 2021-02-02
Attending: PSYCHIATRY & NEUROLOGY
Payer: MEDICARE

## 2021-02-02 VITALS
BODY MASS INDEX: 26.2 KG/M2 | DIASTOLIC BLOOD PRESSURE: 84 MMHG | RESPIRATION RATE: 14 BRPM | HEART RATE: 84 BPM | TEMPERATURE: 97.8 F | SYSTOLIC BLOOD PRESSURE: 126 MMHG | OXYGEN SATURATION: 97 % | HEIGHT: 68 IN | WEIGHT: 172.84 LBS

## 2021-02-02 DIAGNOSIS — R29.898 WEAKNESS OF RIGHT LEG: ICD-10-CM

## 2021-02-02 DIAGNOSIS — M79.661 RIGHT CALF PAIN: ICD-10-CM

## 2021-02-02 DIAGNOSIS — R20.0 RIGHT LEG NUMBNESS: ICD-10-CM

## 2021-02-02 PROCEDURE — 99212 OFFICE O/P EST SF 10 MIN: CPT | Performed by: PSYCHIATRY & NEUROLOGY

## 2021-02-02 PROCEDURE — 99202 OFFICE O/P NEW SF 15 MIN: CPT | Performed by: PSYCHIATRY & NEUROLOGY

## 2021-02-02 PROCEDURE — 99205 OFFICE O/P NEW HI 60 MIN: CPT | Performed by: PSYCHIATRY & NEUROLOGY

## 2021-02-02 ASSESSMENT — ENCOUNTER SYMPTOMS
TINGLING: 1
SENSORY CHANGE: 1
FOCAL WEAKNESS: 0
WEAKNESS: 0

## 2021-02-02 ASSESSMENT — FIBROSIS 4 INDEX: FIB4 SCORE: 1.64

## 2021-02-02 NOTE — PATIENT INSTRUCTIONS
I will order 2 MRI scans: 1 to look at the lumbar spine and 1 to evaluate the nerve bundle in the right pelvis.     Return in 1 month for an in-person follow-up.

## 2021-02-02 NOTE — PROGRESS NOTES
Chief Complaint   Patient presents with   • New Patient     Right calf pain       History of present illness:  Frances Delgado 55 y.o. female with history of hemicrania continua, DM with last A1c of 7.9 referred by Dr. Ly for chronic right leg pain and weakness. Dr. Ly performed a NCS that was 'difficult to interpret' with decreased amplitude of the right tibial motor and absent right fibular motor nerve conduction that raised concern for lumbosacral plexopathy.      She had buckling of her right leg first noticed after standing up from a football game for the past few weeks. She had trouble moving her leg after falling but gradually was able to regain movement in her leg and walk. The leg since then has intermittently been giving out under her leading to falls. The buckling progressively has improved and she has been walking more frequently. Recently she has developed an aching pain in the right anterior shin/calf with walking that is so severe that she has to stop walking. The pain occurs after about 15 minutes of walking. After prolonged walking, touching the calf is painful.     She has chronic low back pain. Weight bearing aggravates the right calf pain. There is no worsening with back extension, coughing, sneezing, or having a bowel movement.     Past medical history:   Past Medical History:   Diagnosis Date   • ADD (attention deficit disorder)    • Bipolar 1 disorder (HCC)    • Bipolar disorder (HCC)    • Cervical cancer (HCC) 1984   • Chronic back pain    • Depression    • Diabetes (HCC)    • Insomnia    • Memory loss due to medical condition     Related to ECT therapy for depression   • Migraine    • Thyroid disease     hypo       Past surgical history:   Past Surgical History:   Procedure Laterality Date   • [place] BREAST AUGMENTATION WITH IMPLANT  2004   • ABDOMINAL HYSTERECTOMY TOTAL  2004    total   • PRIMARY C SECTION     • TOE ARTHROPLASTY Right     cut tendons, fused joints. last surgery  removed last joint of all toes except for big toe       Family history:   Family History   Problem Relation Age of Onset   • Bipolar disorder Father    • Alcohol abuse Father         ETOH   • Other Father         Cirrhosis   • Diabetes Mother    • Hypertension Mother    • Other Mother         gilberts syndrome   • Alcohol abuse Sister    • Psychiatric Illness Sister    • Alcohol abuse Maternal Grandmother    • Other Maternal Grandmother         Brain Tumor   • Diabetes Maternal Grandfather    • Alzheimer's Disease Maternal Grandfather    • Cancer Maternal Grandfather         Skin   • Cancer Maternal Aunt         stomach   • Diabetes Maternal Uncle    • Alcohol abuse Maternal Uncle    • Stroke Maternal Uncle    • Diabetes Sister    • Hyperlipidemia Sister    • Anxiety disorder Sister    • Other Sister         benign uterine tumor   • Obesity Sister    • Obesity Sister    • Alcohol/Drug Maternal Uncle         prescription narcotics   • Cancer Maternal Uncle        Social history:   Social History     Socioeconomic History   • Marital status: Single     Spouse name: Not on file   • Number of children: Not on file   • Years of education: Not on file   • Highest education level: Not on file   Occupational History   • Not on file   Social Needs   • Financial resource strain: Not on file   • Food insecurity     Worry: Not on file     Inability: Not on file   • Transportation needs     Medical: Not on file     Non-medical: Not on file   Tobacco Use   • Smoking status: Former Smoker     Packs/day: 1.00     Years: 38.00     Pack years: 38.00     Types: Cigarettes     Start date: 1978     Quit date: 2016     Years since quittin.4   • Smokeless tobacco: Never Used   Substance and Sexual Activity   • Alcohol use: Yes     Comment: holidays   • Drug use: No   • Sexual activity: Yes     Partners: Male     Birth control/protection: Surgical     Comment:  1.5 years   Lifestyle   • Physical activity     Days per  week: Not on file     Minutes per session: Not on file   • Stress: Not on file   Relationships   • Social connections     Talks on phone: Not on file     Gets together: Not on file     Attends [employer] service: Not on file     Active member of club or organization: Not on file     Attends meetings of clubs or organizations: Not on file     Relationship status: Not on file   • Intimate partner violence     Fear of current or ex partner: Not on file     Emotionally abused: Not on file     Physically abused: Not on file     Forced sexual activity: Not on file   Other Topics Concern   •  Service No   • Blood Transfusions No   • Caffeine Concern No   • Occupational Exposure No   • Hobby Hazards No   • Sleep Concern Yes   • Stress Concern No   • Weight Concern No   • Special Diet No   • Back Care No   • Exercise Yes   • Bike Helmet No   • Seat Belt Yes   • Self-Exams Yes   Social History Narrative   • Not on file       Current medications:   Current Outpatient Medications   Medication   • JARDIANCE 25 MG Tab   • levothyroxine (SYNTHROID) 100 MCG Tab   • glucose blood (CONTOUR NEXT TEST) strip   • omeprazole (PRILOSEC) 40 MG delayed-release capsule   • modafinil (PROVIGIL) 100 MG Tab   • lamotrigine (LAMICTAL) 200 MG tablet   • sumatriptan (IMITREX) 100 MG tablet   • traZODone (DESYREL) 100 MG Tab   • atorvastatin (LIPITOR) 20 MG Tab     No current facility-administered medications for this visit.        Medication Allergy:  Allergies   Allergen Reactions   • Lisinopril Cough       Review of systems:   Review of Systems   Neurological: Positive for tingling (There is pins and needles over the dorsal surface of the feet.) and sensory change (The dorsal surface of both feet has diminished sensation. This is chronic.). Negative for focal weakness and weakness.        Physical examination:   Vitals:    02/02/21 1119   BP: 126/84   BP Location: Right arm   Patient Position: Sitting   BP Cuff Size: Adult   Pulse: 84  "  Resp: 14   Temp: 36.6 °C (97.8 °F)   TempSrc: Temporal   SpO2: 97%   Weight: 78.4 kg (172 lb 13.5 oz)   Height: 1.727 m (5' 8\")     Neurological Exam  Mental Status  Awake, alert and oriented to person, place and time. Speech is normal. Language is fluent with no aphasia.    Motor                                               Right                     Left   Shoulder abduction               5                          5   Shoulder adduction               5                          5  Elbow flexion                         5                          5  Elbow extension                    5                          5  Wrist flexion                           5                          5  Wrist extension                      5                          5  Hip abduction                         5                          5  Hip adduction                         4                          5  Knee flexion                           4+                          5  Knee extension                      4                          5  Ankle inversion                      5                          5  Plantarflexion                         5                          5  Dorsiflexion                            5                          5    Sensory  Pinprick abnormality: Decreased pinprick sensation on the lateral lower leg. Normal sensation over the medial calf.   There is loss of pinprick bilaterally over the dorsum of both feet. .     Reflexes                                           Right                      Left  Brachioradialis                    Tr                         Tr  Biceps                                 Tr                         Tr  Patellar                                2+                         1+  Achilles                                0                         0  Plantar                           Mute                Mute    Gait  Casual gait is normal including stance, stride, and arm swing.  Normal toe " walking. Normal heel walking.      Imaging:   None    ASSESSMENT AND PLAN:  Problem List Items Addressed This Visit     Right leg numbness      Other Visit Diagnoses     Weakness of right leg        Right calf pain              1. Weakness of right leg    2. Right leg numbness    3. Right calf pain    This is a 55 year old female with diabetes mellitus with subacute weakness of the right leg that does not fit a nerve root distribution. My exam is positive for right hip adduction, knee extension/flexion weakness sparing the foot. There is sensory loss over the right lateral calf that does not extend up past the knee. This is consistent with the nerve conduction study performed that suggests a lumbosacral plexopathy. She has exercise induced right calf aching pain that now is present with weight bearing. I have ordered a MRI of the pelvis to evaluate for secondary etiologies of lumbosacral plexopathy as well as a MRI of the lumbar spine. If imaging is negative, this is likely diabetic lumbosacral plexopathy.     An alternate etiology of her right calf pain could be peripheral vascular disease causing right calf claudication however this would not explain her weakness/sensory loss.     FOLLOW-UP:   Return in about 4 weeks (around 3/2/2021).    Total time spent for the day for this patient is: 66 minutes. I spent 39 minutes in face to face time and I spent 9 minutes pre-charting and 18 minutes in post-visit documentation.      Dr. Philip David D.O.  [facility] Neurology   Movement Disorders Specialist

## 2021-03-10 ENCOUNTER — APPOINTMENT (OUTPATIENT)
Dept: RADIOLOGY | Facility: MEDICAL CENTER | Age: 56
End: 2021-03-10
Attending: PSYCHIATRY & NEUROLOGY
Payer: MEDICARE

## 2021-03-11 DIAGNOSIS — E78.2 MIXED HYPERLIPIDEMIA: ICD-10-CM

## 2021-03-11 DIAGNOSIS — E03.9 ACQUIRED HYPOTHYROIDISM: ICD-10-CM

## 2021-03-11 DIAGNOSIS — E11.9 TYPE 2 DIABETES MELLITUS WITHOUT COMPLICATION, WITHOUT LONG-TERM CURRENT USE OF INSULIN (HCC): ICD-10-CM

## 2021-03-11 DIAGNOSIS — I10 ESSENTIAL HYPERTENSION: ICD-10-CM

## 2021-03-11 DIAGNOSIS — K21.9 GASTROESOPHAGEAL REFLUX DISEASE WITHOUT ESOPHAGITIS: ICD-10-CM

## 2021-03-11 DIAGNOSIS — R79.89 ELEVATED LFTS: ICD-10-CM

## 2021-03-11 DIAGNOSIS — Z00.00 ROUTINE HEALTH MAINTENANCE: ICD-10-CM

## 2021-03-12 ENCOUNTER — HOSPITAL ENCOUNTER (OUTPATIENT)
Dept: LAB | Facility: MEDICAL CENTER | Age: 56
End: 2021-03-12
Attending: NURSE PRACTITIONER
Payer: MEDICARE

## 2021-03-12 DIAGNOSIS — E03.9 ACQUIRED HYPOTHYROIDISM: ICD-10-CM

## 2021-03-12 DIAGNOSIS — Z00.00 ROUTINE HEALTH MAINTENANCE: ICD-10-CM

## 2021-03-12 DIAGNOSIS — E78.2 MIXED HYPERLIPIDEMIA: ICD-10-CM

## 2021-03-12 DIAGNOSIS — R79.89 ELEVATED LFTS: ICD-10-CM

## 2021-03-12 DIAGNOSIS — I10 ESSENTIAL HYPERTENSION: ICD-10-CM

## 2021-03-12 DIAGNOSIS — E11.9 TYPE 2 DIABETES MELLITUS WITHOUT COMPLICATION, WITHOUT LONG-TERM CURRENT USE OF INSULIN (HCC): ICD-10-CM

## 2021-03-12 DIAGNOSIS — K21.9 GASTROESOPHAGEAL REFLUX DISEASE WITHOUT ESOPHAGITIS: ICD-10-CM

## 2021-03-12 LAB
ALBUMIN SERPL BCP-MCNC: 4.6 G/DL (ref 3.2–4.9)
ALBUMIN/GLOB SERPL: 1.9 G/DL
ALP SERPL-CCNC: 98 U/L (ref 30–99)
ALT SERPL-CCNC: 43 U/L (ref 2–50)
ANION GAP SERPL CALC-SCNC: 11 MMOL/L (ref 7–16)
AST SERPL-CCNC: 26 U/L (ref 12–45)
BASOPHILS # BLD AUTO: 0.6 % (ref 0–1.8)
BASOPHILS # BLD: 0.03 K/UL (ref 0–0.12)
BILIRUB SERPL-MCNC: 0.6 MG/DL (ref 0.1–1.5)
BUN SERPL-MCNC: 19 MG/DL (ref 8–22)
CALCIUM SERPL-MCNC: 9.8 MG/DL (ref 8.5–10.5)
CHLORIDE SERPL-SCNC: 102 MMOL/L (ref 96–112)
CHOLEST SERPL-MCNC: 250 MG/DL (ref 100–199)
CO2 SERPL-SCNC: 24 MMOL/L (ref 20–33)
CREAT SERPL-MCNC: 0.68 MG/DL (ref 0.5–1.4)
CREAT UR-MCNC: 80.96 MG/DL
EOSINOPHIL # BLD AUTO: 0.11 K/UL (ref 0–0.51)
EOSINOPHIL NFR BLD: 2.4 % (ref 0–6.9)
ERYTHROCYTE [DISTWIDTH] IN BLOOD BY AUTOMATED COUNT: 41.5 FL (ref 35.9–50)
EST. AVERAGE GLUCOSE BLD GHB EST-MCNC: 140 MG/DL
FASTING STATUS PATIENT QL REPORTED: NORMAL
GLOBULIN SER CALC-MCNC: 2.4 G/DL (ref 1.9–3.5)
GLUCOSE SERPL-MCNC: 138 MG/DL (ref 65–99)
HBA1C MFR BLD: 6.5 % (ref 4–5.6)
HCT VFR BLD AUTO: 49.2 % (ref 37–47)
HDLC SERPL-MCNC: 50 MG/DL
HGB BLD-MCNC: 16.3 G/DL (ref 12–16)
IMM GRANULOCYTES # BLD AUTO: 0.02 K/UL (ref 0–0.11)
IMM GRANULOCYTES NFR BLD AUTO: 0.4 % (ref 0–0.9)
LDLC SERPL CALC-MCNC: 168 MG/DL
LYMPHOCYTES # BLD AUTO: 1.23 K/UL (ref 1–4.8)
LYMPHOCYTES NFR BLD: 26.6 % (ref 22–41)
MCH RBC QN AUTO: 30.2 PG (ref 27–33)
MCHC RBC AUTO-ENTMCNC: 33.1 G/DL (ref 33.6–35)
MCV RBC AUTO: 91.3 FL (ref 81.4–97.8)
MICROALBUMIN UR-MCNC: <1.2 MG/DL
MICROALBUMIN/CREAT UR: NORMAL MG/G (ref 0–30)
MONOCYTES # BLD AUTO: 0.36 K/UL (ref 0–0.85)
MONOCYTES NFR BLD AUTO: 7.8 % (ref 0–13.4)
NEUTROPHILS # BLD AUTO: 2.88 K/UL (ref 2–7.15)
NEUTROPHILS NFR BLD: 62.2 % (ref 44–72)
NRBC # BLD AUTO: 0 K/UL
NRBC BLD-RTO: 0 /100 WBC
PLATELET # BLD AUTO: 190 K/UL (ref 164–446)
PMV BLD AUTO: 11.2 FL (ref 9–12.9)
POTASSIUM SERPL-SCNC: 4.3 MMOL/L (ref 3.6–5.5)
PROT SERPL-MCNC: 7 G/DL (ref 6–8.2)
RBC # BLD AUTO: 5.39 M/UL (ref 4.2–5.4)
SODIUM SERPL-SCNC: 137 MMOL/L (ref 135–145)
TRIGL SERPL-MCNC: 158 MG/DL (ref 0–149)
TSH SERPL DL<=0.005 MIU/L-ACNC: 0.83 UIU/ML (ref 0.38–5.33)
WBC # BLD AUTO: 4.6 K/UL (ref 4.8–10.8)

## 2021-03-12 PROCEDURE — 36415 COLL VENOUS BLD VENIPUNCTURE: CPT

## 2021-03-12 PROCEDURE — 80061 LIPID PANEL: CPT

## 2021-03-12 PROCEDURE — 83036 HEMOGLOBIN GLYCOSYLATED A1C: CPT | Mod: GA

## 2021-03-12 PROCEDURE — 80053 COMPREHEN METABOLIC PANEL: CPT

## 2021-03-12 PROCEDURE — 85025 COMPLETE CBC W/AUTO DIFF WBC: CPT

## 2021-03-12 PROCEDURE — 84443 ASSAY THYROID STIM HORMONE: CPT

## 2021-03-12 PROCEDURE — 82570 ASSAY OF URINE CREATININE: CPT

## 2021-03-12 PROCEDURE — 82043 UR ALBUMIN QUANTITATIVE: CPT

## 2021-03-12 NOTE — PROGRESS NOTES
1. Acquired hypothyroidism  - Comp Metabolic Panel; Future  - TSH WITH REFLEX TO FT4; Future    2. Elevated LFTs  - Comp Metabolic Panel; Future    3. Essential hypertension  - CBC WITH DIFFERENTIAL; Future  - Comp Metabolic Panel; Future  - Lipid Profile; Future    4. Gastroesophageal reflux disease without esophagitis  - CBC WITH DIFFERENTIAL; Future    5. Mixed hyperlipidemia  - Comp Metabolic Panel; Future  - Lipid Profile; Future    6. Type 2 diabetes mellitus without complication, without long-term current use of insulin (HCC)  - HEMOGLOBIN A1C; Future  - Comp Metabolic Panel; Future  - MICROALBUMIN CREAT RATIO URINE; Future  - Lipid Profile; Future    7. Routine health maintenance  - HEMOGLOBIN A1C; Future  - CBC WITH DIFFERENTIAL; Future  - Comp Metabolic Panel; Future  - TSH WITH REFLEX TO FT4; Future  - MICROALBUMIN CREAT RATIO URINE; Future  - Lipid Profile; Future

## 2021-03-15 ENCOUNTER — TELEMEDICINE (OUTPATIENT)
Dept: MEDICAL GROUP | Facility: PHYSICIAN GROUP | Age: 56
End: 2021-03-15
Payer: MEDICARE

## 2021-03-15 VITALS — RESPIRATION RATE: 16 BRPM | WEIGHT: 176 LBS | HEIGHT: 68 IN | BODY MASS INDEX: 26.67 KG/M2

## 2021-03-15 DIAGNOSIS — G89.29 CHRONIC BILATERAL LOW BACK PAIN WITHOUT SCIATICA: ICD-10-CM

## 2021-03-15 DIAGNOSIS — E03.9 ACQUIRED HYPOTHYROIDISM: ICD-10-CM

## 2021-03-15 DIAGNOSIS — Z23 NEED FOR VACCINATION: ICD-10-CM

## 2021-03-15 DIAGNOSIS — E11.9 TYPE 2 DIABETES MELLITUS WITHOUT COMPLICATION, WITHOUT LONG-TERM CURRENT USE OF INSULIN (HCC): ICD-10-CM

## 2021-03-15 DIAGNOSIS — M54.50 CHRONIC BILATERAL LOW BACK PAIN WITHOUT SCIATICA: ICD-10-CM

## 2021-03-15 DIAGNOSIS — R79.89 ELEVATED LFTS: ICD-10-CM

## 2021-03-15 DIAGNOSIS — E78.2 MIXED HYPERLIPIDEMIA: ICD-10-CM

## 2021-03-15 PROCEDURE — 99214 OFFICE O/P EST MOD 30 MIN: CPT | Mod: 95,CR | Performed by: NURSE PRACTITIONER

## 2021-03-15 RX ORDER — ROSUVASTATIN CALCIUM 10 MG/1
10 TABLET, COATED ORAL EVERY EVENING
Qty: 90 TABLET | Refills: 1 | Status: SHIPPED | OUTPATIENT
Start: 2021-03-15 | End: 2021-09-09

## 2021-03-15 RX ORDER — BUPROPION HYDROCHLORIDE 150 MG/1
150 TABLET ORAL EVERY MORNING
COMMUNITY

## 2021-03-15 ASSESSMENT — FIBROSIS 4 INDEX: FIB4 SCORE: 1.19
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
## 2021-03-15 NOTE — ASSESSMENT & PLAN NOTE
Chronic, uncontrolled.  Patient was previously taking atorvastatin 20 mg/day, has been on the medication for a while.  Experienced myalgias in August 2020 and reached out to the med group, was advised to stop taking the medication and if myalgias improved to follow-up with PCP to discuss alternative lipid management medication.  The patient did not follow-up, but continue to not take atorvastatin.  Reports that her myalgias resolved when stopping atorvastatin.  The 10-year ASCVD risk score (Lynbrook GEO Jr., et al., 2013) is: 6%
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
## 2021-03-15 NOTE — ASSESSMENT & PLAN NOTE
Chronic, improving.  The patient's hemoglobin A1c in May 2020 was elevated at 7.9% with an estimated average glucose of 180.  After these results, the patient states that she worked very hard on decreasing sugar in her diet and exercise.  States that she had gotten her morning fasting blood sugars down to around 104 consistently.  She has lost more than 35 pounds.  She gradually gained 27 pounds back over the holidays.  She has continued to exercise regularly.  Most recent hemoglobin A1c 6.5%.  Continues Jardiance 25 mg daily.

## 2021-03-15 NOTE — ASSESSMENT & PLAN NOTE
Chronic, stable as of most recent labs.  Reports that chronic elevated LFTs in the past have been related to psychiatric medications.  Due for annual labs in March 2022.

## 2021-03-19 ENCOUNTER — IMMUNIZATION (OUTPATIENT)
Dept: FAMILY PLANNING/WOMEN'S HEALTH CLINIC | Facility: IMMUNIZATION CENTER | Age: 56
End: 2021-03-19
Attending: INTERNAL MEDICINE
Payer: MEDICARE

## 2021-03-19 DIAGNOSIS — Z23 NEED FOR VACCINATION: ICD-10-CM

## 2021-03-19 DIAGNOSIS — Z23 ENCOUNTER FOR VACCINATION: Primary | ICD-10-CM

## 2021-03-19 PROCEDURE — 91301 MODERNA SARS-COV-2 VACCINE: CPT

## 2021-03-19 PROCEDURE — 0011A MODERNA SARS-COV-2 VACCINE: CPT

## 2021-03-29 ENCOUNTER — OFFICE VISIT (OUTPATIENT)
Dept: MEDICAL GROUP | Facility: PHYSICIAN GROUP | Age: 56
End: 2021-03-29
Payer: MEDICARE

## 2021-03-29 VITALS
WEIGHT: 178 LBS | DIASTOLIC BLOOD PRESSURE: 80 MMHG | SYSTOLIC BLOOD PRESSURE: 130 MMHG | HEIGHT: 68 IN | OXYGEN SATURATION: 94 % | TEMPERATURE: 97.6 F | HEART RATE: 114 BPM | BODY MASS INDEX: 26.98 KG/M2

## 2021-03-29 DIAGNOSIS — B35.1 ONYCHOMYCOSIS: ICD-10-CM

## 2021-03-29 DIAGNOSIS — K21.9 GASTROESOPHAGEAL REFLUX DISEASE WITHOUT ESOPHAGITIS: ICD-10-CM

## 2021-03-29 DIAGNOSIS — Z12.31 ENCOUNTER FOR SCREENING MAMMOGRAM FOR BREAST CANCER: ICD-10-CM

## 2021-03-29 DIAGNOSIS — E11.42 TYPE 2 DIABETES MELLITUS WITH DIABETIC POLYNEUROPATHY, WITHOUT LONG-TERM CURRENT USE OF INSULIN (HCC): ICD-10-CM

## 2021-03-29 PROCEDURE — 99214 OFFICE O/P EST MOD 30 MIN: CPT | Performed by: NURSE PRACTITIONER

## 2021-03-29 RX ORDER — GABAPENTIN 100 MG/1
100 CAPSULE ORAL
Qty: 90 CAPSULE | Refills: 0 | Status: SHIPPED | OUTPATIENT
Start: 2021-03-29 | End: 2021-05-17 | Stop reason: SDUPTHER

## 2021-03-29 ASSESSMENT — FIBROSIS 4 INDEX: FIB4 SCORE: 1.19

## 2021-03-29 NOTE — ASSESSMENT & PLAN NOTE
Chronic, improving.  Patient was seen by podiatry, reports that the podiatrist filed down her nails and advised her to treat with over-the-counter topical Lotrimin cream.  Reports that the issue has nearly resolved after 4 weeks of treatment.  She will follow up with podiatry only as needed.

## 2021-03-29 NOTE — LETTER
[facility]  CRISTINA Gallegos  [address]  [place] [address]  Fax: [phone]   Authorization for Release/Disclosure of   Protected Health Information   Name: JOHN LOPEZ : 1965 SSN: [id number]   Address: [address] [place] [address]  [place] [address] Phone:    [phone] (home)    I authorize the entity listed below to release/disclose the PHI below to:   [facility]/CRISTINA Gallegos and CRISTINA Gallegos   Provider or Entity Name:  Dr. Giorgi Reddy   Address   City, [facility], [facility]   Phone:      Fax:     Reason for request: continuity of care   Information to be released:    [  ] LAST COLONOSCOPY,  including any PATH REPORT and follow-up  [  ] LAST FIT/COLOGUARD RESULT [  ] LAST DEXA  [  ] LAST MAMMOGRAM  [  ] LAST PAP  [  ] LAST LABS [  ] RETINA EXAM REPORT  [  ] IMMUNIZATION RECORDS  [X] Release all info      [  ] Check here and initial the line next to each item to release ALL health information INCLUDING  _____ Care and treatment for drug and / or alcohol abuse  _____ HIV testing, infection status, or AIDS  _____ Genetic Testing    DATES OF SERVICE OR TIME PERIOD TO BE DISCLOSED: _____________  I understand and acknowledge that:  * This Authorization may be revoked at any time by you in writing, except if your health information has already been used or disclosed.  * Your health information that will be used or disclosed as a result of you signing this authorization could be re-disclosed by the recipient. If this occurs, your re-disclosed health information may no longer be protected by State or Federal laws.  * You may refuse to sign this Authorization. Your refusal will not affect your ability to obtain treatment.  * This Authorization becomes effective upon signing and will  on (date) __________.      If no date is indicated, this Authorization will  one (1) year from the signature date.    Name: John Lopez    Signature:   Date:     3/29/2021         PLEASE FAX REQUESTED RECORDS BACK TO: [phone]

## 2021-03-29 NOTE — LETTER
[facility]  CRISTINA Gallegos  [address]  [place] [address]  Fax: [phone]   Authorization for Release/Disclosure of   Protected Health Information   Name: JOHN LOPEZ : 1965 SSN: [id number]   Address: [address] [place] [address]  [place] [address] Phone:    [phone] (home)    I authorize the entity listed below to release/disclose the PHI below to:   [facility]/KRISTOPHER GallegosRSARAH and CRISTINA Gallegos   Provider or Entity Name:  Dr. Reynoso ([facility])   Address   City, [facility], [facility]   Phone:      Fax:     Reason for request: continuity of care   Information to be released:    [  ] LAST COLONOSCOPY,  including any PATH REPORT and follow-up  [  ] LAST FIT/COLOGUARD RESULT [  ] LAST DEXA  [  ] LAST MAMMOGRAM  [  ] LAST PAP  [  ] LAST LABS [X] RETINA EXAM REPORT  [  ] IMMUNIZATION RECORDS  [  ] Release all info      [  ] Check here and initial the line next to each item to release ALL health information INCLUDING  _____ Care and treatment for drug and / or alcohol abuse  _____ HIV testing, infection status, or AIDS  _____ Genetic Testing    DATES OF SERVICE OR TIME PERIOD TO BE DISCLOSED: _____________  I understand and acknowledge that:  * This Authorization may be revoked at any time by you in writing, except if your health information has already been used or disclosed.  * Your health information that will be used or disclosed as a result of you signing this authorization could be re-disclosed by the recipient. If this occurs, your re-disclosed health information may no longer be protected by State or Federal laws.  * You may refuse to sign this Authorization. Your refusal will not affect your ability to obtain treatment.  * This Authorization becomes effective upon signing and will  on (date) __________.      If no date is indicated, this Authorization will  one (1) year from the signature date.    Name: John Lopez    Signature:    Date:     3/29/2021       PLEASE FAX REQUESTED RECORDS BACK TO: [phone]

## 2021-03-29 NOTE — ASSESSMENT & PLAN NOTE
Chronic, ongoing.  The patient was seen by podiatry for ongoing onychomycosis.  During her exam, podiatrist noted that she had significant neuropathy and recommended orthotic shoes.  Patient reports that she has persistent burning on the bottom of her feet that is worse when walking for exercise.  The podiatrist told her that she has very little padding on the ball of her foot and believes that the orthotics will help with the pain when walking related to the neuropathy and diabetes.  Reports that the neuropathy is interfering with her exercise and is also interfering with her sleep.  States that night when she is trying to sleep, her feet will itch or feel like they are on fire.  She has been prescribed gabapentin in the past, 100 mg 3 times daily, states that she felt awful while taking the medication.  Podiatrist recommended that she try the medication only at night.  The patient is interested in retrying gabapentin nightly as well as orders for orthotic shoes for her diabetes and neuropathy to be sent to [facility] clinic.

## 2021-03-30 RX ORDER — OMEPRAZOLE 40 MG/1
CAPSULE, DELAYED RELEASE ORAL
Qty: 90 CAPSULE | Refills: 3 | Status: SHIPPED | OUTPATIENT
Start: 2021-03-30 | End: 2022-02-28

## 2021-03-30 NOTE — TELEPHONE ENCOUNTER
Requested Prescriptions     Pending Prescriptions Disp Refills   • omeprazole (PRILOSEC) 40 MG delayed-release capsule [Pharmacy Med Name: OMEPRAZOLE  40MG  CAP] 90 capsule 3     Sig: TAKE 1 CAPSULE BY MOUTH  DAILY       DANIEL Gallegos.

## 2021-03-30 NOTE — TELEPHONE ENCOUNTER
Received request via: Pharmacy    Was the patient seen in the last year in this department? Yes 3/29/21    Does the patient have an active prescription (recently filled or refills available) for medication(s) requested? No

## 2021-03-30 NOTE — PROGRESS NOTES
CC:   Chief Complaint   Patient presents with   • Paperwork     Orthodics     HISTORY OF THE PRESENT ILLNESS: Patient is a 55 y.o. female. This pleasant patient is here today to request orthotic shoe prescription to be sent to [facility] for diabetes and neuropathy.    Health Maintenance: Reviewed    Type 2 diabetes mellitus with diabetic polyneuropathy, without long-term current use of insulin (HCC)  Chronic, ongoing.  The patient was seen by podiatry for ongoing onychomycosis.  During her exam, podiatrist noted that she had significant neuropathy and recommended orthotic shoes.  Patient reports that she has persistent burning on the bottom of her feet that is worse when walking for exercise.  The podiatrist told her that she has very little padding on the ball of her foot and believes that the orthotics will help with the pain when walking related to the neuropathy and diabetes.  Reports that the neuropathy is interfering with her exercise and is also interfering with her sleep.  States that night when she is trying to sleep, her feet will itch or feel like they are on fire.  She has been prescribed gabapentin in the past, 100 mg 3 times daily, states that she felt awful while taking the medication.  Podiatrist recommended that she try the medication only at night.  The patient is interested in retrying gabapentin nightly as well as orders for orthotic shoes for her diabetes and neuropathy to be sent to [facility].    Onychomycosis  Chronic, improving.  Patient was seen by podiatry, reports that the podiatrist filed down her nails and advised her to treat with over-the-counter topical Lotrimin cream.  Reports that the issue has nearly resolved after 4 weeks of treatment.  She will follow up with podiatry only as needed.    Allergies: Lisinopril  Current Outpatient Medications Ordered in Epic   Medication Sig Dispense Refill   • Misc. Devices Misc 1 Pair of Diabetic Orthopedic Shoes and inserts for diabetic  foot care 1 Each 3   • gabapentin (NEURONTIN) 100 MG Cap Take 1 capsule by mouth every bedtime. 90 capsule 0   • buPROPion (WELLBUTRIN XL) 150 MG XL tablet Take 150 mg by mouth every morning.     • rosuvastatin (CRESTOR) 10 MG Tab Take 1 tablet by mouth every evening. 90 tablet 1   • JARDIANCE 25 MG Tab TAKE 1 TABLET BY MOUTH  DAILY 90 Tab 3   • levothyroxine (SYNTHROID) 100 MCG Tab TAKE 1 TABLET BY MOUTH IN  THE MORNING ON AN EMPTY  STOMACH 90 Tab 2   • glucose blood (CONTOUR NEXT TEST) strip 1 Strip by Other route every day. Use to test blood sugar 1 time daily 100 Strip 3   • omeprazole (PRILOSEC) 40 MG delayed-release capsule Take 1 Cap by mouth every day. 90 Cap 3   • lamotrigine (LAMICTAL) 200 MG tablet Take 200 mg by mouth every day.     • sumatriptan (IMITREX) 100 MG tablet Take 1 Tab by mouth Once PRN. 10 Tab 11   • traZODone (DESYREL) 100 MG Tab TAKE 1/2 TO 1 TABLET BY  MOUTH AT BEDTIME AS NEEDED  FOR SLEEP 90 Tab 0     No current Epic-ordered facility-administered medications on file.     Past Medical History:   Diagnosis Date   • ADD (attention deficit disorder)    • Bipolar 1 disorder (HCC)    • Bipolar disorder (HCC)    • Cervical cancer (HCC)    • Chronic back pain    • Depression    • Diabetes (HCC)    • Insomnia    • Memory loss due to medical condition     Related to ECT therapy for depression   • Migraine    • Thyroid disease     hypo     Past Surgical History:   Procedure Laterality Date   • [place] BREAST AUGMENTATION WITH IMPLANT     • ABDOMINAL HYSTERECTOMY TOTAL      total   • PRIMARY C SECTION     • TOE ARTHROPLASTY Right     cut tendons, fused joints. last surgery removed last joint of all toes except for big toe     Social History     Tobacco Use   • Smoking status: Former Smoker     Packs/day: 1.00     Years: 38.00     Pack years: 38.00     Types: Cigarettes     Start date: 1978     Quit date: 2016     Years since quittin.6   • Smokeless tobacco: Never Used   Substance  "Use Topics   • Alcohol use: Yes     Comment: holidays   • Drug use: No     Social History     Social History Narrative   • Not on file     Family History   Problem Relation Age of Onset   • Bipolar disorder Father    • Alcohol abuse Father         ETOH   • Other Father         Cirrhosis   • Diabetes Mother    • Hypertension Mother    • Other Mother         gilberts syndrome   • Alcohol abuse Sister    • Psychiatric Illness Sister    • Alcohol abuse Maternal Grandmother    • Other Maternal Grandmother         Brain Tumor   • Diabetes Maternal Grandfather    • Alzheimer's Disease Maternal Grandfather    • Cancer Maternal Grandfather         Skin   • Cancer Maternal Aunt         stomach   • Diabetes Maternal Uncle    • Alcohol abuse Maternal Uncle    • Stroke Maternal Uncle    • Diabetes Sister    • Hyperlipidemia Sister    • Anxiety disorder Sister    • Other Sister         benign uterine tumor   • Obesity Sister    • Obesity Sister    • Alcohol/Drug Maternal Uncle         prescription narcotics   • Cancer Maternal Uncle      ROS:   Constitutional: No fevers, chills, malaise/fatigue.  Eyes: No eye pain.  ENT: No sore throat, congestion.   Resp: No cough, shortness of breath.  CV: No chest pain, leg swelling, palpitations.  GI: No nausea/vomiting, abdominal pain, constipation, diarrhea.  : No dysuria, hematuria.  MSK: + Chronic back pain.   Skin: + Improving onychomycosis to toenails.  No rashes.  Neuro: + Bilateral foot neuropathy.  No dizziness, weakness, headaches.  Psych: No suicidal ideations.    All remaining systems reviewed and found to be negative, except as stated above.        Exam: /80 (BP Location: Left arm, Patient Position: Sitting, BP Cuff Size: Adult)   Pulse (!) 114   Temp 36.4 °C (97.6 °F) (Temporal)   Ht 1.727 m (5' 8\")   Wt 80.7 kg (178 lb)   SpO2 94%  Body mass index is 27.06 kg/m².    General: Well nourished, well developed female in NAD, awake and conversant.  Eyes: Normal " conjunctiva, anicteric.  Round symmetrical pupils.  ENT: Hearing grossly intact.  No nasal discharge.  Neck: Neck is supple.  No masses or thyromegaly.  CV: No lower extremity edema.  Respiratory: Respirations are nonlabored.  No wheezing.  Abdomen: Non-Distended.  Skin: Warm.  No rashes or ulcers.  MSK: Normal ambulation.  No clubbing or cyanosis.  Neuro: Sensation and CN II-XII grossly normal.  Psych: Alert and oriented.  Cooperative, appropriate mood and affect, normal judgment.    Assessment/Plan:  1. Type 2 diabetes mellitus with diabetic polyneuropathy, without long-term current use of insulin (HCC)  Chronic, ongoing.  Continue Jardiance 25 mg/day.  Plan to start gabapentin 100 mg nightly for neuropathy. Diabetic monofilament exam completed with podiatrist.  Order for diabetic orthotic shoes and inserts dispensed to [facility].  Continue to follow with podiatry as needed.  - gabapentin (NEURONTIN) 100 MG Cap; Take 1 capsule by mouth every bedtime.  Dispense: 90 capsule; Refill: 0  - Misc. Devices Misc; 1 Pair of Diabetic Orthopedic Shoes and inserts for diabetic foot care  Dispense: 1 Each; Refill: 3    2. Onychomycosis  Chronic, improving.  Continue over-the-counter topical Lamisil cream as needed.  Continue to follow with podiatry as needed.    3. Encounter for screening mammogram for breast cancer  Due for screening.  - MA-SCREENING MAMMO BILAT W/TOMOSYNTHESIS W/CAD; Future     Educated in proper administration of medication(s) ordered today including safety, possible SE, risks, benefits, rationale and alternatives to therapy.   Supportive care, differential diagnoses, and indications for immediate follow-up discussed with patient.    Pathogenesis of diagnosis discussed including typical length and natural progression.    Instructed to return to clinic or nearest emergency department for any change in condition, further concerns, or worsening of symptoms.  Patient states understanding of the plan of care  and discharge instructions.    Consent for records release signed to order medical records from Dr. Reddy podiatry and Dr. Reynoso for retina screening.    Return if symptoms worsen or fail to improve.    Please note that this dictation was created using voice recognition software. I have made every reasonable attempt to correct obvious errors, but I expect that there are errors of grammar and possibly content that I did not discover before finalizing the note.

## 2021-04-13 ENCOUNTER — HOSPITAL ENCOUNTER (OUTPATIENT)
Dept: RADIOLOGY | Facility: MEDICAL CENTER | Age: 56
End: 2021-04-13
Attending: PSYCHIATRY & NEUROLOGY
Payer: MEDICARE

## 2021-04-13 DIAGNOSIS — R20.0 RIGHT LEG NUMBNESS: ICD-10-CM

## 2021-04-13 DIAGNOSIS — R29.898 WEAKNESS OF RIGHT LEG: ICD-10-CM

## 2021-04-13 PROCEDURE — 72148 MRI LUMBAR SPINE W/O DYE: CPT | Mod: MG

## 2021-04-13 PROCEDURE — 700117 HCHG RX CONTRAST REV CODE 255: Performed by: PSYCHIATRY & NEUROLOGY

## 2021-04-13 PROCEDURE — 73720 MRI LWR EXTREMITY W/O&W/DYE: CPT | Mod: RT

## 2021-04-13 PROCEDURE — 72197 MRI PELVIS W/O & W/DYE: CPT | Mod: MG

## 2021-04-13 PROCEDURE — A9576 INJ PROHANCE MULTIPACK: HCPCS | Performed by: PSYCHIATRY & NEUROLOGY

## 2021-04-13 RX ADMIN — GADOTERIDOL 15 ML: 279.3 INJECTION, SOLUTION INTRAVENOUS at 14:30

## 2021-04-16 ENCOUNTER — OFFICE VISIT (OUTPATIENT)
Dept: NEUROLOGY | Facility: MEDICAL CENTER | Age: 56
End: 2021-04-16
Attending: PSYCHIATRY & NEUROLOGY
Payer: MEDICARE

## 2021-04-16 VITALS
RESPIRATION RATE: 14 BRPM | SYSTOLIC BLOOD PRESSURE: 128 MMHG | OXYGEN SATURATION: 95 % | HEART RATE: 76 BPM | HEIGHT: 68 IN | WEIGHT: 178 LBS | BODY MASS INDEX: 26.98 KG/M2 | TEMPERATURE: 97.3 F | DIASTOLIC BLOOD PRESSURE: 74 MMHG

## 2021-04-16 DIAGNOSIS — R29.898 RIGHT LEG WEAKNESS: ICD-10-CM

## 2021-04-16 DIAGNOSIS — F40.240 CLAUSTROPHOBIA: ICD-10-CM

## 2021-04-16 PROCEDURE — 99215 OFFICE O/P EST HI 40 MIN: CPT | Performed by: PSYCHIATRY & NEUROLOGY

## 2021-04-16 PROCEDURE — 99212 OFFICE O/P EST SF 10 MIN: CPT | Performed by: PSYCHIATRY & NEUROLOGY

## 2021-04-16 RX ORDER — DIAZEPAM 2 MG/1
2 TABLET ORAL
Qty: 1 TABLET | Refills: 0 | Status: SHIPPED | OUTPATIENT
Start: 2021-04-16 | End: 2021-05-07

## 2021-04-16 ASSESSMENT — FIBROSIS 4 INDEX: FIB4 SCORE: 1.19

## 2021-04-16 NOTE — PROGRESS NOTES
"Chief Complaint   Patient presents with   • Follow-Up     Weakness of right leg       History of present illness:  Frances Delgado 55 y.o. female with diabetes mellitus with subacute weakness of the right leg.      2/2/21:   She had buckling of her right leg first noticed after standing up from a football game for the past few weeks. She had trouble moving her leg after falling but gradually was able to regain movement in her leg and walk. The leg since then has intermittently been giving out under her leading to falls. The buckling progressively has improved and she has been walking more frequently. Recently she has developed an aching pain in the right anterior shin/calf with walking that is so severe that she has to stop walking. The pain occurs after about 15 minutes of walking. After prolonged walking, touching the calf is painful.     4/16/21:   MRI of the lumbosacral plexus was normal.   Weakness started in 2019. She first noticed the right leg weakness acutely after trying to stand up after watching TV. The leg was completely numb at onset. After a few hours, she was able to bear weight and gradually this improved over time however it never returned to normal. Pain was never a prominent part of her right leg weakness.   She has chronic low back pain.   She has normal bladder/bowel function.   Her right leg does not feel the \"same\" as her left leg.     Past medical history:   Past Medical History:   Diagnosis Date   • ADD (attention deficit disorder)    • Bipolar 1 disorder (HCC)    • Bipolar disorder (HCC)    • Cervical cancer (HCC) 1984   • Chronic back pain    • Depression    • Diabetes (HCC)    • Insomnia    • Memory loss due to medical condition     Related to ECT therapy for depression   • Migraine    • Thyroid disease     hypo       Past surgical history:   Past Surgical History:   Procedure Laterality Date   • [place] BREAST AUGMENTATION WITH IMPLANT  2004   • ABDOMINAL HYSTERECTOMY TOTAL  2004    total "   • PRIMARY C SECTION     • TOE ARTHROPLASTY Right     cut tendons, fused joints. last surgery removed last joint of all toes except for big toe       Family history:   Family History   Problem Relation Age of Onset   • Bipolar disorder Father    • Alcohol abuse Father         ETOH   • Other Father         Cirrhosis   • Diabetes Mother    • Hypertension Mother    • Other Mother         gilberts syndrome   • Alcohol abuse Sister    • Psychiatric Illness Sister    • Alcohol abuse Maternal Grandmother    • Other Maternal Grandmother         Brain Tumor   • Diabetes Maternal Grandfather    • Alzheimer's Disease Maternal Grandfather    • Cancer Maternal Grandfather         Skin   • Cancer Maternal Aunt         stomach   • Diabetes Maternal Uncle    • Alcohol abuse Maternal Uncle    • Stroke Maternal Uncle    • Diabetes Sister    • Hyperlipidemia Sister    • Anxiety disorder Sister    • Other Sister         benign uterine tumor   • Obesity Sister    • Obesity Sister    • Alcohol/Drug Maternal Uncle         prescription narcotics   • Cancer Maternal Uncle        Social history:   Social History     Socioeconomic History   • Marital status: Single     Spouse name: Not on file   • Number of children: Not on file   • Years of education: Not on file   • Highest education level: Not on file   Occupational History   • Not on file   Tobacco Use   • Smoking status: Former Smoker     Packs/day: 1.00     Years: 38.00     Pack years: 38.00     Types: Cigarettes     Start date: 1978     Quit date: 2016     Years since quittin.6   • Smokeless tobacco: Never Used   Substance and Sexual Activity   • Alcohol use: Not Currently     Comment: holidays   • Drug use: No   • Sexual activity: Yes     Partners: Male     Birth control/protection: Surgical     Comment:  1.5 years   Other Topics Concern   •  Service No   • Blood Transfusions No   • Caffeine Concern No   • Occupational Exposure No   • Hobby Hazards No   •  Sleep Concern Yes   • Stress Concern No   • Weight Concern No   • Special Diet No   • Back Care No   • Exercise Yes   • Bike Helmet No   • Seat Belt Yes   • Self-Exams Yes   Social History Narrative   • Not on file     Social Determinants of Health     Financial Resource Strain:    • Difficulty of Paying Living Expenses:    Food Insecurity:    • Worried About Running Out of Food in the Last Year:    • Ran Out of Food in the Last Year:    Transportation Needs:    • Lack of Transportation (Medical):    • Lack of Transportation (Non-Medical):    Physical Activity:    • Days of Exercise per Week:    • Minutes of Exercise per Session:    Stress:    • Feeling of Stress :    Social Connections:    • Frequency of Communication with Friends and Family:    • Frequency of Social Gatherings with Friends and Family:    • Attends [employer] Services:    • Active Member of Clubs or Organizations:    • Attends Club or Organization Meetings:    • Marital Status:    Intimate Partner Violence:    • Fear of Current or Ex-Partner:    • Emotionally Abused:    • Physically Abused:    • Sexually Abused:        Current medications:   Current Outpatient Medications   Medication   • diazePAM (VALIUM) 2 MG Tab   • omeprazole (PRILOSEC) 40 MG delayed-release capsule   • gabapentin (NEURONTIN) 100 MG Cap   • buPROPion (WELLBUTRIN XL) 150 MG XL tablet   • rosuvastatin (CRESTOR) 10 MG Tab   • JARDIANCE 25 MG Tab   • levothyroxine (SYNTHROID) 100 MCG Tab   • glucose blood (CONTOUR NEXT TEST) strip   • lamotrigine (LAMICTAL) 200 MG tablet   • sumatriptan (IMITREX) 100 MG tablet   • traZODone (DESYREL) 100 MG Tab   • Misc. Devices Misc     No current facility-administered medications for this visit.       Medication Allergy:  Allergies   Allergen Reactions   • Lisinopril Cough       Physical examination:   Vitals:    04/16/21 0834   BP: 128/74   BP Location: Left arm   Patient Position: Sitting   BP Cuff Size: Adult   Pulse: 76   Resp: 14   Temp: 36.3  "°C (97.3 °F)   TempSrc: Temporal   SpO2: 95%   Weight: 80.7 kg (178 lb)   Height: 1.727 m (5' 8\")     Neurological Exam    Motor                                               Right                     Left  Hip flexion                              5                          5  Hip abduction                         5                          5  Hip adduction                         5                          5  Knee flexion                           5                          5  Knee extension                      5                          5  Dorsiflexion                            5                          5    Reflexes                                           Right                      Left  Patellar                                2+                         1+  Achilles                                0                         0  Plantar                           Mute                Mute    Gait  Casual gait is normal including stance, stride, and arm swing.  Normal toe walking. Normal heel walking.       Labs:  I reviewed the following labs personally:  Lab Results   Component Value Date/Time    HBA1C 6.5 (H) 2021 09:31 AM   Normal renal/liver function tests  Normal TSH      Imagin21 MRI PELVIS:   IMPRESSION:     There is no mass or contrast enhancement along the lumbar and sacral plexus. The right sciatic nerve is unremarkable.    ASSESSMENT AND PLAN:  Problem List Items Addressed This Visit     None      Visit Diagnoses     Right leg weakness        Relevant Orders    MR-BRAIN-W/O    Claustrophobia        Relevant Medications    diazePAM (VALIUM) 2 MG Tab          1. Right leg weakness  - MR-BRAIN-W/O; Future    2. Claustrophobia  - diazePAM (VALIUM) 2 MG Tab; Take 1 tablet by mouth one time as needed for Anxiety (30 minutes prior to MRI scan) for up to 1 dose.  Dispense: 1 tablet; Refill: 0    Revisiting the history, the patient describes acute right leg weakness and sensory loss that " gradually improved over time but is not back to normal over a 2 year period. There was no pain associated. I counseled her that this sounds most like a stroke. I ordered a brain MRI to evaluate for a remote infarct.   This history is atypical for diabetic amyotrophy. There is no abnormality of the lumbosacral plexus or the lumbar spine on MRI.     FOLLOW-UP:   Return in about 2 months (around 6/16/2021) for virtual follow-up .    Total time spent for the day for this patient is: 53 minutes. I spent 34 minutes in face to face time and I spent 17 minutes pre-charting and 2 minutes in post-visit documentation.      SANJUANA ZhuO.  [facility] Neurology   Movement Disorders Specialist

## 2021-04-17 ENCOUNTER — IMMUNIZATION (OUTPATIENT)
Dept: FAMILY PLANNING/WOMEN'S HEALTH CLINIC | Facility: IMMUNIZATION CENTER | Age: 56
End: 2021-04-17
Attending: INTERNAL MEDICINE
Payer: MEDICARE

## 2021-04-17 DIAGNOSIS — Z23 ENCOUNTER FOR VACCINATION: Primary | ICD-10-CM

## 2021-04-17 PROCEDURE — 0012A MODERNA SARS-COV-2 VACCINE: CPT | Performed by: INTERNAL MEDICINE

## 2021-04-17 PROCEDURE — 91301 MODERNA SARS-COV-2 VACCINE: CPT | Performed by: INTERNAL MEDICINE

## 2021-05-12 ENCOUNTER — HOSPITAL ENCOUNTER (OUTPATIENT)
Dept: RADIOLOGY | Facility: MEDICAL CENTER | Age: 56
End: 2021-05-12
Attending: PSYCHIATRY & NEUROLOGY
Payer: MEDICARE

## 2021-05-12 DIAGNOSIS — R29.898 RIGHT LEG WEAKNESS: ICD-10-CM

## 2021-05-12 PROCEDURE — 70551 MRI BRAIN STEM W/O DYE: CPT | Mod: MH

## 2021-05-14 ENCOUNTER — PATIENT MESSAGE (OUTPATIENT)
Dept: NEUROLOGY | Facility: MEDICAL CENTER | Age: 56
End: 2021-05-14

## 2021-05-17 DIAGNOSIS — G89.29 CHRONIC BILATERAL LOW BACK PAIN WITHOUT SCIATICA: ICD-10-CM

## 2021-05-17 DIAGNOSIS — G62.9 PERIPHERAL POLYNEUROPATHY: ICD-10-CM

## 2021-05-17 DIAGNOSIS — E11.42 TYPE 2 DIABETES MELLITUS WITH DIABETIC POLYNEUROPATHY, WITHOUT LONG-TERM CURRENT USE OF INSULIN (HCC): ICD-10-CM

## 2021-05-17 DIAGNOSIS — M54.50 CHRONIC BILATERAL LOW BACK PAIN WITHOUT SCIATICA: ICD-10-CM

## 2021-05-17 RX ORDER — CELECOXIB 100 MG/1
100 CAPSULE ORAL 2 TIMES DAILY
Qty: 180 CAPSULE | Refills: 0 | Status: SHIPPED | OUTPATIENT
Start: 2021-05-17 | End: 2021-08-17

## 2021-05-17 RX ORDER — GABAPENTIN 100 MG/1
200 CAPSULE ORAL
Qty: 180 CAPSULE | Refills: 1 | Status: SHIPPED | OUTPATIENT
Start: 2021-05-17 | End: 2021-11-11

## 2021-05-17 NOTE — PROGRESS NOTES
Requested Prescriptions     Signed Prescriptions Disp Refills   • gabapentin (NEURONTIN) 100 MG Cap 180 capsule 1     Sig: Take 2 Capsules by mouth at bedtime.   • celecoxib (CELEBREX) 100 MG Cap 180 capsule 0     Sig: Take 1 capsule by mouth 2 times a day.       DANIEL Gallegos.

## 2021-05-28 DIAGNOSIS — E03.9 ACQUIRED HYPOTHYROIDISM: ICD-10-CM

## 2021-06-01 ENCOUNTER — HOSPITAL ENCOUNTER (OUTPATIENT)
Dept: RADIOLOGY | Facility: MEDICAL CENTER | Age: 56
End: 2021-06-01
Attending: NURSE PRACTITIONER
Payer: MEDICARE

## 2021-06-01 DIAGNOSIS — Z12.31 ENCOUNTER FOR SCREENING MAMMOGRAM FOR BREAST CANCER: ICD-10-CM

## 2021-06-01 PROCEDURE — 77063 BREAST TOMOSYNTHESIS BI: CPT

## 2021-06-01 RX ORDER — LEVOTHYROXINE SODIUM 0.1 MG/1
TABLET ORAL
Qty: 90 TABLET | Refills: 2 | Status: SHIPPED | OUTPATIENT
Start: 2021-06-01 | End: 2022-02-08

## 2021-06-01 NOTE — TELEPHONE ENCOUNTER
Requested Prescriptions     Pending Prescriptions Disp Refills   • levothyroxine (SYNTHROID) 100 MCG Tab [Pharmacy Med Name: LEVOTHYROXINE  100MCG  TAB] 90 tablet 2     Sig: TAKE 1 TABLET BY MOUTH IN  THE MORNING ON AN EMPTY  STOMACH       DANIEL Gallegos.

## 2021-08-03 ENCOUNTER — PATIENT MESSAGE (OUTPATIENT)
Dept: MEDICAL GROUP | Facility: PHYSICIAN GROUP | Age: 56
End: 2021-08-03

## 2021-08-03 DIAGNOSIS — E03.9 ACQUIRED HYPOTHYROIDISM: ICD-10-CM

## 2021-08-03 DIAGNOSIS — E11.42 TYPE 2 DIABETES MELLITUS WITH DIABETIC POLYNEUROPATHY, WITHOUT LONG-TERM CURRENT USE OF INSULIN (HCC): ICD-10-CM

## 2021-08-17 DIAGNOSIS — M54.50 CHRONIC BILATERAL LOW BACK PAIN WITHOUT SCIATICA: ICD-10-CM

## 2021-08-17 DIAGNOSIS — G89.29 CHRONIC BILATERAL LOW BACK PAIN WITHOUT SCIATICA: ICD-10-CM

## 2021-08-17 RX ORDER — CELECOXIB 100 MG/1
CAPSULE ORAL
Qty: 180 CAPSULE | Refills: 3 | Status: SHIPPED | OUTPATIENT
Start: 2021-08-17 | End: 2022-07-24

## 2021-08-18 NOTE — TELEPHONE ENCOUNTER
Requested Prescriptions     Pending Prescriptions Disp Refills   • celecoxib (CELEBREX) 100 MG Cap [Pharmacy Med Name: CELECOXIB 100 MG CAPSULE] 180 Capsule 3     Sig: TAKE 1 CAPSULE BY MOUTH TWICE A DAY       DANIEL Gallegos.

## 2021-09-09 DIAGNOSIS — E78.2 MIXED HYPERLIPIDEMIA: ICD-10-CM

## 2021-09-09 RX ORDER — ROSUVASTATIN CALCIUM 10 MG/1
10 TABLET, COATED ORAL EVERY EVENING
Qty: 90 TABLET | Refills: 0 | OUTPATIENT
Start: 2021-09-09

## 2021-09-09 RX ORDER — ROSUVASTATIN CALCIUM 10 MG/1
TABLET, COATED ORAL
Qty: 90 TABLET | Refills: 1 | Status: SHIPPED | OUTPATIENT
Start: 2021-09-09 | End: 2022-03-09

## 2021-09-10 NOTE — TELEPHONE ENCOUNTER
Requested Prescriptions     Pending Prescriptions Disp Refills   • rosuvastatin (CRESTOR) 10 MG Tab [Pharmacy Med Name: ROSUVASTATIN CALCIUM 10 MG TAB] 90 Tablet 1     Sig: TAKE 1 TABLET BY MOUTH EVERY DAY IN THE EVENING       DANIEL Gallegos.

## 2021-09-15 ENCOUNTER — TELEPHONE (OUTPATIENT)
Dept: PHYSICAL MEDICINE AND REHAB | Facility: REHABILITATION | Age: 56
End: 2021-09-15

## 2021-09-20 DIAGNOSIS — E11.9 TYPE 2 DIABETES MELLITUS WITHOUT COMPLICATION, WITHOUT LONG-TERM CURRENT USE OF INSULIN (HCC): Chronic | ICD-10-CM

## 2021-09-21 ENCOUNTER — HOSPITAL ENCOUNTER (OUTPATIENT)
Dept: LAB | Facility: MEDICAL CENTER | Age: 56
End: 2021-09-21
Attending: NURSE PRACTITIONER
Payer: MEDICARE

## 2021-09-21 DIAGNOSIS — E78.2 MIXED HYPERLIPIDEMIA: ICD-10-CM

## 2021-09-21 DIAGNOSIS — E11.9 TYPE 2 DIABETES MELLITUS WITHOUT COMPLICATION, WITHOUT LONG-TERM CURRENT USE OF INSULIN (HCC): ICD-10-CM

## 2021-09-21 LAB
CHOLEST SERPL-MCNC: 144 MG/DL (ref 100–199)
EST. AVERAGE GLUCOSE BLD GHB EST-MCNC: 148 MG/DL
FASTING STATUS PATIENT QL REPORTED: NORMAL
HBA1C MFR BLD: 6.8 % (ref 4–5.6)
HDLC SERPL-MCNC: 43 MG/DL
LDLC SERPL CALC-MCNC: 81 MG/DL
TRIGL SERPL-MCNC: 99 MG/DL (ref 0–149)

## 2021-09-21 PROCEDURE — 80061 LIPID PANEL: CPT

## 2021-09-21 PROCEDURE — 36415 COLL VENOUS BLD VENIPUNCTURE: CPT

## 2021-09-21 PROCEDURE — 83036 HEMOGLOBIN GLYCOSYLATED A1C: CPT | Mod: GA

## 2021-09-21 RX ORDER — EMPAGLIFLOZIN 25 MG/1
TABLET, FILM COATED ORAL
Qty: 90 TABLET | Refills: 0 | OUTPATIENT
Start: 2021-09-21

## 2021-09-27 ENCOUNTER — OFFICE VISIT (OUTPATIENT)
Dept: MEDICAL GROUP | Facility: PHYSICIAN GROUP | Age: 56
End: 2021-09-27
Payer: MEDICARE

## 2021-09-27 VITALS
OXYGEN SATURATION: 94 % | HEIGHT: 68 IN | WEIGHT: 177 LBS | TEMPERATURE: 97.5 F | SYSTOLIC BLOOD PRESSURE: 118 MMHG | HEART RATE: 86 BPM | BODY MASS INDEX: 26.83 KG/M2 | DIASTOLIC BLOOD PRESSURE: 82 MMHG

## 2021-09-27 DIAGNOSIS — E11.42 TYPE 2 DIABETES MELLITUS WITH DIABETIC POLYNEUROPATHY, WITHOUT LONG-TERM CURRENT USE OF INSULIN (HCC): ICD-10-CM

## 2021-09-27 DIAGNOSIS — G62.9 PERIPHERAL POLYNEUROPATHY: ICD-10-CM

## 2021-09-27 DIAGNOSIS — E78.2 MIXED HYPERLIPIDEMIA: ICD-10-CM

## 2021-09-27 DIAGNOSIS — Z23 NEED FOR VACCINATION: ICD-10-CM

## 2021-09-27 PROCEDURE — 99214 OFFICE O/P EST MOD 30 MIN: CPT | Mod: 25 | Performed by: NURSE PRACTITIONER

## 2021-09-27 PROCEDURE — G0008 ADMIN INFLUENZA VIRUS VAC: HCPCS | Performed by: NURSE PRACTITIONER

## 2021-09-27 PROCEDURE — 90686 IIV4 VACC NO PRSV 0.5 ML IM: CPT | Performed by: NURSE PRACTITIONER

## 2021-09-27 RX ORDER — SEMAGLUTIDE 1.34 MG/ML
INJECTION, SOLUTION SUBCUTANEOUS
Qty: 3 EACH | Refills: 1 | Status: SHIPPED | OUTPATIENT
Start: 2021-09-27 | End: 2021-11-30

## 2021-09-27 RX ORDER — VORTIOXETINE 5 MG/1
TABLET, FILM COATED ORAL
COMMUNITY
Start: 2021-08-17 | End: 2022-01-18

## 2021-09-27 ASSESSMENT — FIBROSIS 4 INDEX: FIB4 SCORE: 1.21

## 2021-09-27 NOTE — ASSESSMENT & PLAN NOTE
Chronic, improving.  Continues rosuvastatin 10 mg nightly, denies any side effects of the medication.  Due to repeat lipid profile in September 2022.  The 10-year ASCVD risk score (Abhaymoody GUARDADO Jr., et al., 2013) is: 4%

## 2021-09-27 NOTE — ASSESSMENT & PLAN NOTE
Chronic, ongoing.  Continues Jardiance 25 mg daily.  Reports that she has exercised more in the last 6 months than she has in her entire life.  She does yoga flow 3 times a week, plays pickle ball, and walks regularly.  States that she is trying to eat healthy and chooses organic foods and good choices, but not on a specific diet.  She has been seen by podiatry who recommended orthotics, she is picking these up today.  She is interested in discontinuing Jardiance and starting Ozempic.

## 2021-09-27 NOTE — LETTER
[facility]  CRISTINA Gallegos  [address]  [place] [address]  Fax: [phone]   Authorization for Release/Disclosure of   Protected Health Information   Name: JOHN LOPEZ : 1965 SSN: [id number]   Address: [address] [place] [address] [place] [address] Phone:    [phone] (home) [phone] (work)   I authorize the entity listed below to release/disclose the PHI below to:   [facility]/CRISTINA Gallegos and CRISTINA Gallegos   Provider or Entity Name:  Dr. Reynoso, [facility]   Address   City, [facility], [facility]   Phone:      Fax:     Reason for request: continuity of care   Information to be released:    [  ] LAST COLONOSCOPY,  including any PATH REPORT and follow-up  [  ] LAST FIT/COLOGUARD RESULT [  ] LAST DEXA  [  ] LAST MAMMOGRAM  [  ] LAST PAP  [  ] LAST LABS [X] RETINA EXAM REPORT  [  ] IMMUNIZATION RECORDS  [  ] Release all info      [  ] Check here and initial the line next to each item to release ALL health information INCLUDING  _____ Care and treatment for drug and / or alcohol abuse  _____ HIV testing, infection status, or AIDS  _____ Genetic Testing    DATES OF SERVICE OR TIME PERIOD TO BE DISCLOSED: _____________  I understand and acknowledge that:  * This Authorization may be revoked at any time by you in writing, except if your health information has already been used or disclosed.  * Your health information that will be used or disclosed as a result of you signing this authorization could be re-disclosed by the recipient. If this occurs, your re-disclosed health information may no longer be protected by State or Federal laws.  * You may refuse to sign this Authorization. Your refusal will not affect your ability to obtain treatment.  * This Authorization becomes effective upon signing and will  on (date) __________.      If no date is indicated, this Authorization will  one (1) year from the signature date.    Name: John GUTIERREZ  Sandy    Signature:   Date:     9/27/2021       PLEASE FAX REQUESTED RECORDS BACK TO: [phone]

## 2021-09-28 NOTE — ASSESSMENT & PLAN NOTE
Chronic, ongoing.  Continues gabapentin 200 mg nightly.  Reports that the medication has made a significant improvement in the pain from the neuropathy.  Has been seen by podiatry and is picking up custom orthotic shoes today.

## 2021-09-28 NOTE — PROGRESS NOTES
CC:   Chief Complaint   Patient presents with   • Medication Management     change Jardiance to Ozempic     HISTORY OF THE PRESENT ILLNESS: Patient is a 56 y.o. female. This pleasant patient is here today to review labs and discuss diabetes medications.    Health Maintenance: Completed    Type 2 diabetes mellitus with diabetic polyneuropathy, without long-term current use of insulin (HCC)  Chronic, ongoing.  Continues Jardiance 25 mg daily.  Reports that she has exercised more in the last 6 months than she has in her entire life.  She does yoga flow 3 times a week, plays pickle ball, and walks regularly.  States that she is trying to eat healthy and chooses organic foods and good choices, but not on a specific diet.  She has been seen by podiatry who recommended orthotics, she is picking these up today.  She is interested in discontinuing Jardiance and starting Ozempic.    Mixed hyperlipidemia  Chronic, improving.  Continues rosuvastatin 10 mg nightly, denies any side effects of the medication.  Due to repeat lipid profile in September 2022.  The 10-year ASCVD risk score ([place] GEO Jr., et al., 2013) is: 4%     Peripheral polyneuropathy (HCC)  Chronic, ongoing.  Continues gabapentin 200 mg nightly.  Reports that the medication has made a significant improvement in the pain from the neuropathy.  Has been seen by podiatry and is picking up custom orthotic shoes today.    Allergies: Lisinopril  Current Outpatient Medications Ordered in Epic   Medication Sig Dispense Refill   • TRINTELLIX 5 MG Tab      • Semaglutide,0.25 or 0.5MG/DOS, (OZEMPIC, 0.25 OR 0.5 MG/DOSE,) 2 MG/1.5ML Solution Pen-injector Inject 0.25 mg under the skin every 7 days for 28 days, THEN 0.5 mg every 7 days for 56 days. 3 Each 1   • rosuvastatin (CRESTOR) 10 MG Tab TAKE 1 TABLET BY MOUTH EVERY DAY IN THE EVENING 90 Tablet 1   • celecoxib (CELEBREX) 100 MG Cap TAKE 1 CAPSULE BY MOUTH TWICE A  Capsule 3   • levothyroxine (SYNTHROID) 100 MCG Tab  TAKE 1 TABLET BY MOUTH IN  THE MORNING ON AN EMPTY  STOMACH 90 tablet 2   • gabapentin (NEURONTIN) 100 MG Cap Take 2 Capsules by mouth at bedtime. 180 capsule 1   • omeprazole (PRILOSEC) 40 MG delayed-release capsule TAKE 1 CAPSULE BY MOUTH  DAILY 90 capsule 3   • buPROPion (WELLBUTRIN XL) 150 MG XL tablet Take 150 mg by mouth every morning.     • glucose blood (CONTOUR NEXT TEST) strip 1 Strip by Other route every day. Use to test blood sugar 1 time daily 100 Strip 3   • lamotrigine (LAMICTAL) 200 MG tablet Take 200 mg by mouth every day.     • sumatriptan (IMITREX) 100 MG tablet Take 1 Tab by mouth Once PRN. 10 Tab 11   • traZODone (DESYREL) 100 MG Tab TAKE 1/2 TO 1 TABLET BY  MOUTH AT BEDTIME AS NEEDED  FOR SLEEP 90 Tab 0     No current Epic-ordered facility-administered medications on file.     Past Medical History:   Diagnosis Date   • ADD (attention deficit disorder)    • Bipolar 1 disorder (HCC)    • Bipolar disorder (HCC)    • Cervical cancer (HCC)    • Chronic back pain    • Depression    • Diabetes (HCC)    • Insomnia    • Memory loss due to medical condition     Related to ECT therapy for depression   • Migraine    • Thyroid disease     hypo     Past Surgical History:   Procedure Laterality Date   • CT BREAST AUGMENTATION WITH IMPLANT     • ABDOMINAL HYSTERECTOMY TOTAL      total   • PRIMARY C SECTION     • TOE ARTHROPLASTY Right     cut tendons, fused joints. last surgery removed last joint of all toes except for big toe     Social History     Tobacco Use   • Smoking status: Former Smoker     Packs/day: 1.00     Years: 38.00     Pack years: 38.00     Types: Cigarettes     Start date: 1978     Quit date: 2016     Years since quittin.1   • Smokeless tobacco: Never Used   Vaping Use   • Vaping Use: Never used   Substance Use Topics   • Alcohol use: Not Currently     Comment: holidays   • Drug use: No     Social History     Social History Narrative   • Not on file     Family History  "  Problem Relation Age of Onset   • Bipolar disorder Father    • Alcohol abuse Father         ETOH   • Other Father         Cirrhosis   • Diabetes Mother    • Hypertension Mother    • Other Mother         gilberts syndrome   • Alcohol abuse Sister    • Psychiatric Illness Sister    • Alcohol abuse Maternal Grandmother    • Other Maternal Grandmother         Brain Tumor   • Diabetes Maternal Grandfather    • Alzheimer's Disease Maternal Grandfather    • Cancer Maternal Grandfather         Skin   • Cancer Maternal Aunt         stomach   • Diabetes Maternal Uncle    • Alcohol abuse Maternal Uncle    • Stroke Maternal Uncle    • Diabetes Sister    • Hyperlipidemia Sister    • Anxiety disorder Sister    • Other Sister         benign uterine tumor   • Obesity Sister    • Obesity Sister    • Alcohol/Drug Maternal Uncle         prescription narcotics   • Cancer Maternal Uncle      ROS:   Constitutional: No fevers, chills, malaise/fatigue.  Eyes: No eye pain.  ENT: No sore throat, congestion.   Resp: No cough, shortness of breath.  CV: No chest pain, leg swelling, palpitations.  GI: No nausea/vomiting, abdominal pain, constipation, diarrhea.  : No dysuria, hematuria.  MSK: No weakness.  Skin: No rashes.  Neuro: + Bilateral foot neuropathy.  No dizziness, weakness, headaches.  Psych: No suicidal ideations.    All remaining systems reviewed and found to be negative, except as stated above.        Exam: /82 (BP Location: Left arm, Patient Position: Sitting, BP Cuff Size: Adult)   Pulse 86   Temp 36.4 °C (97.5 °F) (Temporal)   Ht 1.727 m (5' 8\")   Wt 80.3 kg (177 lb)   SpO2 94%  Body mass index is 26.91 kg/m².    General: Well nourished, well developed female in NAD, awake and conversant.  Eyes: Normal conjunctiva, anicteric.  Round symmetrical pupils.  ENT: Hearing grossly intact.  No nasal discharge.  Neck: Neck is supple.  No masses or thyromegaly.  CV: No lower extremity edema.  Respiratory: Respirations are " nonlabored.  No wheezing.  Abdomen: Non-Distended.  Skin: Warm.  No rashes or ulcers.  MSK: Normal ambulation.  No clubbing or cyanosis.  Neuro: Sensation and CN II-XII grossly normal.  Psych: Alert and oriented.  Cooperative, appropriate mood and affect, normal judgment.     Monofilament testing with a 10 gram force: sensation intact: decreased bilaterally  Visual Inspection: Feet without maceration, ulcers, fissures.  Pedal pulses: intact bilaterally     Assessment/Plan:  1. Type 2 diabetes mellitus with diabetic polyneuropathy, without long-term current use of insulin (HCC)  Chronic, ongoing.  Discontinue Jardiance.  Start Ozempic 0.25 mg weekly for 4 weeks then increase to 0.5 mg weekly for 8 weeks.  Plan to follow-up in clinic in 3 months to repeat A1c.  Picking up custom orthotic shoes today.  Continue gabapentin 200 mg nightly for neuropathy.  Retinal records requested.  Monofilament completed today.  - Semaglutide,0.25 or 0.5MG/DOS, (OZEMPIC, 0.25 OR 0.5 MG/DOSE,) 2 MG/1.5ML Solution Pen-injector; Inject 0.25 mg under the skin every 7 days for 28 days, THEN 0.5 mg every 7 days for 56 days.  Dispense: 3 Each; Refill: 1  - Diabetic Monofilament Lower Extremity Exam    2. Mixed hyperlipidemia  Chronic, improving.  Continue rosuvastatin 10 mg daily, does not need a refill at this time.  Due for lipid profile in September 2022.    3. Peripheral polyneuropathy  Chronic, ongoing.  Patient reports improvement in symptoms with gabapentin 200 mg nightly, continue current dose, does not need a refill at this time.    4. Need for vaccination  Influenza vaccine given today.  Patient provided with paper prescription for Shingrix vaccine.  Advised patient to take prescription to pharmacy to check for availability and/or to be put on a waiting list.  Advised the patient that Shingrix is a series of two consecutive vaccines.   - Zoster Vac Recomb Adjuvanted (SHINGRIX) 50 MCG/0.5ML Recon Susp; Inject 0.5 mL into the shoulder,  thigh, or buttocks one time for 1 dose.  Dispense: 0.5 mL; Refill: 0  - INFLUENZA VACCINE QUAD INJ (PF)    Educated in proper administration of medication(s) ordered today including safety, possible SE, risks, benefits, rationale and alternatives to therapy.   Supportive care, differential diagnoses, and indications for immediate follow-up discussed with patient.    Pathogenesis of diagnosis discussed including typical length and natural progression.    Instructed to return to clinic or nearest emergency department for any change in condition, further concerns, or worsening of symptoms.  Patient states understanding of the plan of care and discharge instructions.    Consent for records release signed to order medical records from retinal screening from Dr. Reynoso.    Return in about 4 months (around 1/12/2022) for Diabetes, A1C in Clinic.    I have placed the below orders and discussed them with an approved delegating provider. The MA is performing the below orders under the direction of Dr. Li.    Please note that this dictation was created using voice recognition software. I have made every reasonable attempt to correct obvious errors, but I expect that there are errors of grammar and possibly content that I did not discover before finalizing the note.

## 2021-10-07 RX ORDER — EMPAGLIFLOZIN 25 MG/1
TABLET, FILM COATED ORAL
Qty: 90 TABLET | Refills: 0 | OUTPATIENT
Start: 2021-10-07

## 2021-11-11 DIAGNOSIS — E11.42 TYPE 2 DIABETES MELLITUS WITH DIABETIC POLYNEUROPATHY, WITHOUT LONG-TERM CURRENT USE OF INSULIN (HCC): ICD-10-CM

## 2021-11-11 DIAGNOSIS — G62.9 PERIPHERAL POLYNEUROPATHY: ICD-10-CM

## 2021-11-12 RX ORDER — GABAPENTIN 100 MG/1
200 CAPSULE ORAL
Qty: 180 CAPSULE | Refills: 1 | Status: SHIPPED | OUTPATIENT
Start: 2021-11-12 | End: 2022-09-04

## 2021-11-12 NOTE — TELEPHONE ENCOUNTER
Requested Prescriptions     Pending Prescriptions Disp Refills   • gabapentin (NEURONTIN) 100 MG Cap [Pharmacy Med Name: GABAPENTIN 100 MG CAPSULE] 180 Capsule 1     Sig: TAKE 2 CAPSULES BY MOUTH AT BEDTIME.       DANIEL Gallegos.

## 2021-11-30 DIAGNOSIS — E11.42 TYPE 2 DIABETES MELLITUS WITH DIABETIC POLYNEUROPATHY, WITHOUT LONG-TERM CURRENT USE OF INSULIN (HCC): ICD-10-CM

## 2021-11-30 RX ORDER — SEMAGLUTIDE 1.34 MG/ML
INJECTION, SOLUTION SUBCUTANEOUS
Qty: 1.5 ML | Refills: 1 | Status: SHIPPED | OUTPATIENT
Start: 2021-11-30 | End: 2022-01-18 | Stop reason: SDUPTHER

## 2021-11-30 NOTE — TELEPHONE ENCOUNTER
Requested Prescriptions     Signed Prescriptions Disp Refills   • OZEMPIC, 0.25 OR 0.5 MG/DOSE, 2 MG/1.5ML Solution Pen-injector 1.5 mL 1     Sig: INJECT 0.25 MG UNDER THE SKIN EVERY 7 DAYS FOR 28 DAYS, THEN 0.5 MG EVERY 7 DAYS FOR 56 DAYS.     Authorizing Provider: MAYRA SANTANA A.P.R.N.

## 2022-01-17 NOTE — ASSESSMENT & PLAN NOTE
This is a chronic problem for the patient that is controlled with lisinopril 10 mg daily.  The patient's blood pressure today is 124/78 with a heart rate of 90. The patient denies chest pain, shortness of breath, headaches, dizziness, blurry vision, or dyspnea on exertion.   spoke with MD to add ensure clear TID

## 2022-01-18 ENCOUNTER — OFFICE VISIT (OUTPATIENT)
Dept: MEDICAL GROUP | Facility: PHYSICIAN GROUP | Age: 57
End: 2022-01-18
Payer: MEDICARE

## 2022-01-18 VITALS
HEIGHT: 68 IN | SYSTOLIC BLOOD PRESSURE: 118 MMHG | DIASTOLIC BLOOD PRESSURE: 70 MMHG | OXYGEN SATURATION: 95 % | TEMPERATURE: 96.9 F | HEART RATE: 99 BPM | WEIGHT: 173 LBS | BODY MASS INDEX: 26.22 KG/M2

## 2022-01-18 DIAGNOSIS — B97.7 HIGH RISK HPV INFECTION: ICD-10-CM

## 2022-01-18 DIAGNOSIS — Z00.00 ROUTINE HEALTH MAINTENANCE: ICD-10-CM

## 2022-01-18 DIAGNOSIS — E03.9 ACQUIRED HYPOTHYROIDISM: ICD-10-CM

## 2022-01-18 DIAGNOSIS — E11.42 TYPE 2 DIABETES MELLITUS WITH DIABETIC POLYNEUROPATHY, WITHOUT LONG-TERM CURRENT USE OF INSULIN (HCC): ICD-10-CM

## 2022-01-18 DIAGNOSIS — E66.3 OVERWEIGHT (BMI 25.0-29.9): ICD-10-CM

## 2022-01-18 LAB
HBA1C MFR BLD: 5.8 % (ref 0–5.6)
INT CON NEG: ABNORMAL
INT CON POS: ABNORMAL

## 2022-01-18 PROCEDURE — 99214 OFFICE O/P EST MOD 30 MIN: CPT | Performed by: NURSE PRACTITIONER

## 2022-01-18 PROCEDURE — 83036 HEMOGLOBIN GLYCOSYLATED A1C: CPT | Performed by: NURSE PRACTITIONER

## 2022-01-18 RX ORDER — BUPROPION HYDROCHLORIDE 300 MG/1
TABLET ORAL
COMMUNITY
Start: 2021-11-17

## 2022-01-18 RX ORDER — SEMAGLUTIDE 1.34 MG/ML
0.5 INJECTION, SOLUTION SUBCUTANEOUS
Qty: 6 ML | Refills: 3 | Status: SHIPPED | OUTPATIENT
Start: 2022-01-18 | End: 2023-01-29

## 2022-01-18 RX ORDER — SERTRALINE HYDROCHLORIDE 100 MG/1
TABLET, FILM COATED ORAL
COMMUNITY
Start: 2021-11-17 | End: 2023-04-10

## 2022-01-18 ASSESSMENT — PATIENT HEALTH QUESTIONNAIRE - PHQ9
5. POOR APPETITE OR OVEREATING: 1 - SEVERAL DAYS
SUM OF ALL RESPONSES TO PHQ QUESTIONS 1-9: 9
CLINICAL INTERPRETATION OF PHQ2 SCORE: 2

## 2022-01-18 ASSESSMENT — FIBROSIS 4 INDEX: FIB4 SCORE: 1.21

## 2022-01-18 NOTE — ASSESSMENT & PLAN NOTE
Chronic, improving.  Continues Ozempic 0.5 mg weekly.  Reports that initially she had severe diarrhea, this has gradually improved to occurring about weekly.  She is managing the diarrhea with over-the-counter Imodium.  Her father is also taking Ozempic and experienced diarrhea that resolved after 3 months, so she is hopeful.  She continues to monitor her blood sugars and reports the average range is 98-1 28 with the occasional rare high around 154.  She does note that the 2 mg per 1.5 mL pen does not give her a full 4 doses due to medication lost during waste.  Due for updated A1c.  Due for retinal screening, will be scheduling with optometry.

## 2022-01-19 NOTE — ASSESSMENT & PLAN NOTE
Chronic, improving.  Patient's weight today 173 pounds, down from 178 pounds in April 2021 and down from 188 pounds in February 2020.  Due for updated annual labs prior to follow-up in July 2022.

## 2022-01-19 NOTE — PROGRESS NOTES
CC:   Chief Complaint   Patient presents with   • Diabetes     A1C      HISTORY OF THE PRESENT ILLNESS: Patient is a 56 y.o. female. This pleasant patient is here today to repeat A1c in clinic.    Health Maintenance: Reviewed    Type 2 diabetes mellitus with diabetic polyneuropathy, without long-term current use of insulin (HCC)  Chronic, improving.  Continues Ozempic 0.5 mg weekly.  Reports that initially she had severe diarrhea, this has gradually improved to occurring about weekly.  She is managing the diarrhea with over-the-counter Imodium.  Her father is also taking Ozempic and experienced diarrhea that resolved after 3 months, so she is hopeful.  She continues to monitor her blood sugars and reports the average range is 98-1 28 with the occasional rare high around 154.  She does note that the 2 mg per 1.5 mL pen does not give her a full 4 doses due to medication lost during waste.  Due for updated A1c.  Due for retinal screening, will be scheduling with optometry.    Overweight (BMI 25.0-29.9)  Chronic, improving.  Patient's weight today 173 pounds, down from 178 pounds in April 2021 and down from 188 pounds in February 2020.  Due for updated annual labs prior to follow-up in July 2022.    Allergies: Lisinopril  Current Outpatient Medications Ordered in Epic   Medication Sig Dispense Refill   • buPROPion (WELLBUTRIN XL) 300 MG XL tablet      • sertraline (ZOLOFT) 100 MG Tab      • Semaglutide,0.25 or 0.5MG/DOS, (OZEMPIC, 0.25 OR 0.5 MG/DOSE,) 2 MG/1.5ML Solution Pen-injector Inject 0.5 mg under the skin every 7 days. 6 mL 3   • gabapentin (NEURONTIN) 100 MG Cap TAKE 2 CAPSULES BY MOUTH AT BEDTIME. 180 Capsule 1   • rosuvastatin (CRESTOR) 10 MG Tab TAKE 1 TABLET BY MOUTH EVERY DAY IN THE EVENING 90 Tablet 1   • celecoxib (CELEBREX) 100 MG Cap TAKE 1 CAPSULE BY MOUTH TWICE A  Capsule 3   • levothyroxine (SYNTHROID) 100 MCG Tab TAKE 1 TABLET BY MOUTH IN  THE MORNING ON AN EMPTY  STOMACH 90 tablet 2   •  omeprazole (PRILOSEC) 40 MG delayed-release capsule TAKE 1 CAPSULE BY MOUTH  DAILY 90 capsule 3   • buPROPion (WELLBUTRIN XL) 150 MG XL tablet Take 150 mg by mouth every morning.     • glucose blood (CONTOUR NEXT TEST) strip 1 Strip by Other route every day. Use to test blood sugar 1 time daily 100 Strip 3   • lamotrigine (LAMICTAL) 200 MG tablet Take 200 mg by mouth every day.     • sumatriptan (IMITREX) 100 MG tablet Take 1 Tab by mouth Once PRN. 10 Tab 11   • traZODone (DESYREL) 100 MG Tab TAKE 1/2 TO 1 TABLET BY  MOUTH AT BEDTIME AS NEEDED  FOR SLEEP 90 Tab 0     No current Epic-ordered facility-administered medications on file.     Past Medical History:   Diagnosis Date   • ADD (attention deficit disorder)    • Bipolar 1 disorder (HCC)    • Bipolar disorder (HCC)    • Cervical cancer (HCC)    • Chronic back pain    • Depression    • Diabetes (HCC)    • Insomnia    • Memory loss due to medical condition     Related to ECT therapy for depression   • Migraine    • Thyroid disease     hypo     Past Surgical History:   Procedure Laterality Date   • ME BREAST AUGMENTATION WITH IMPLANT     • ABDOMINAL HYSTERECTOMY TOTAL      total   • PRIMARY C SECTION     • TOE ARTHROPLASTY Right     cut tendons, fused joints. last surgery removed last joint of all toes except for big toe     Social History     Tobacco Use   • Smoking status: Former Smoker     Packs/day: 1.00     Years: 38.00     Pack years: 38.00     Types: Cigarettes     Start date: 1978     Quit date: 2016     Years since quittin.4   • Smokeless tobacco: Never Used   Vaping Use   • Vaping Use: Never used   Substance Use Topics   • Alcohol use: Not Currently     Comment: holidays   • Drug use: No     Social History     Social History Narrative   • Not on file     Family History   Problem Relation Age of Onset   • Bipolar disorder Father    • Alcohol abuse Father         ETOH   • Other Father         Cirrhosis   • Diabetes Mother    •  "Hypertension Mother    • Other Mother         gilberts syndrome   • Alcohol abuse Sister    • Psychiatric Illness Sister    • Alcohol abuse Maternal Grandmother    • Other Maternal Grandmother         Brain Tumor   • Diabetes Maternal Grandfather    • Alzheimer's Disease Maternal Grandfather    • Cancer Maternal Grandfather         Skin   • Cancer Maternal Aunt         stomach   • Diabetes Maternal Uncle    • Alcohol abuse Maternal Uncle    • Stroke Maternal Uncle    • Diabetes Sister    • Hyperlipidemia Sister    • Anxiety disorder Sister    • Other Sister         benign uterine tumor   • Obesity Sister    • Obesity Sister    • Alcohol/Drug Maternal Uncle         prescription narcotics   • Cancer Maternal Uncle      ROS:   Constitutional: No fevers, chills, malaise/fatigue.  Eyes: No eye pain.  ENT: No sore throat, congestion.   Resp: No cough, shortness of breath.  CV: No chest pain, leg swelling, palpitations.  GI: + Improving diarrhea.  No nausea/vomiting, abdominal pain, constipation.  : No dysuria, hematuria.  MSK: No weakness.  Skin: No rashes.  Neuro: No dizziness, weakness, headaches.  Psych: No suicidal ideations.    All remaining systems reviewed and found to be negative, except as stated above.        Exam: /70 (BP Location: Left arm, Patient Position: Sitting, BP Cuff Size: Adult)   Pulse 99   Temp 36.1 °C (96.9 °F) (Temporal)   Ht 1.727 m (5' 8\")   Wt 78.5 kg (173 lb)   SpO2 95%  Body mass index is 26.3 kg/m².    General: Well nourished, well developed female in NAD, awake and conversant.  Eyes: Normal conjunctiva, anicteric.  Round symmetrical pupils.  ENT: Hearing grossly intact.  No nasal discharge.  Neck: Neck is supple.  No masses or thyromegaly.  CV: No lower extremity edema.  Respiratory: Respirations are nonlabored.  No wheezing.  Abdomen: Non-Distended.  Skin: Warm.  No rashes or ulcers.  MSK: Normal ambulation.  No clubbing or cyanosis.  Neuro: Sensation and CN II-XII grossly " normal.  Psych: Alert and oriented.  Cooperative, appropriate mood and affect, normal judgment.     Assessment/Plan:  1. Type 2 diabetes mellitus with diabetic polyneuropathy, without long-term current use of insulin (HCC)  Chronic, improving.  A1c in clinic 5.8%, improved from 6.8% in September 2021.  Continue Ozempic 0.5 mg injection weekly, prescription to specify dispensing 4 pens/6 mL per 90 days so that she has enough medication for weekly dosing.  Patient to schedule with optometry for retinal screening, request that records be sent to provider after completed.  Due for updated annual labs prior to follow-up in July 2022.  - POCT  A1C  - Semaglutide,0.25 or 0.5MG/DOS, (OZEMPIC, 0.25 OR 0.5 MG/DOSE,) 2 MG/1.5ML Solution Pen-injector; Inject 0.5 mg under the skin every 7 days.  Dispense: 6 mL; Refill: 3  - HEMOGLOBIN A1C; Future  - Comp Metabolic Panel; Future  - Lipid Profile; Future  - MICROALBUMIN CREAT RATIO URINE; Future    2. High risk HPV infection  New to examiner.  Patient has a history of hysterectomy in 2004.  Last Pap smear completed in 2018 and was positive for high risk HPV.  Referral to gynecology.  - Referral to Gynecology    3. Acquired hypothyroidism  Chronic, ongoing.  Continue levothyroxine 100 mcg daily, does not need refill at this time.  Due for updated annual labs in July 2022.  - TSH WITH REFLEX TO FT4; Future    4. Overweight (BMI 25.0-29.9)  Due for updated annual labs prior to follow-up in July 2022.  - CBC WITH DIFFERENTIAL; Future  - Comp Metabolic Panel; Future  - Lipid Profile; Future    5. Routine health maintenance  Due for updated annual labs prior to follow-up in July 2022.  - HEMOGLOBIN A1C; Future  - CBC WITH DIFFERENTIAL; Future  - Comp Metabolic Panel; Future  - Lipid Profile; Future  - MICROALBUMIN CREAT RATIO URINE; Future  - TSH WITH REFLEX TO FT4; Future     Educated in proper administration of medication(s) ordered today including safety, possible SE, risks,  benefits, rationale and alternatives to therapy.   Supportive care, differential diagnoses, and indications for immediate follow-up discussed with patient.    Pathogenesis of diagnosis discussed including typical length and natural progression.    Instructed to return to clinic or nearest emergency department for any change in condition, further concerns, or worsening of symptoms.  Patient states understanding of the plan of care and discharge instructions.    Return in about 6 months (around 7/18/2022) for HC/PCP Annual Wellness Visit- Medicare, Follow up Labs.    I have placed the below orders and discussed them with an approved delegating provider. The MA is performing the below orders under the direction of Dr. Li.    Please note that this dictation was created using voice recognition software. I have made every reasonable attempt to correct obvious errors, but I expect that there are errors of grammar and possibly content that I did not discover before finalizing the note.

## 2022-02-07 DIAGNOSIS — E03.9 ACQUIRED HYPOTHYROIDISM: ICD-10-CM

## 2022-02-08 RX ORDER — LEVOTHYROXINE SODIUM 0.1 MG/1
TABLET ORAL
Qty: 90 TABLET | Refills: 1 | Status: SHIPPED | OUTPATIENT
Start: 2022-02-08 | End: 2022-07-28

## 2022-02-08 NOTE — TELEPHONE ENCOUNTER
Received request via: Pharmacy    Was the patient seen in the last year in this department? Yes    Does the patient have an active prescription (recently filled or refills available) for medication(s) requested? No on last refill

## 2022-02-09 NOTE — TELEPHONE ENCOUNTER
Requested Prescriptions     Pending Prescriptions Disp Refills   • levothyroxine (SYNTHROID) 100 MCG Tab [Pharmacy Med Name: Levothyroxine Sodium 100 MCG Oral Tablet] 90 Tablet 1     Sig: TAKE 1 TABLET BY MOUTH IN  THE MORNING ON AN EMPTY  STOMACH       DANIEL Gallegos.

## 2022-02-26 DIAGNOSIS — K21.9 GASTROESOPHAGEAL REFLUX DISEASE WITHOUT ESOPHAGITIS: ICD-10-CM

## 2022-03-01 RX ORDER — OMEPRAZOLE 40 MG/1
40 CAPSULE, DELAYED RELEASE ORAL DAILY
Qty: 90 CAPSULE | Refills: 3 | Status: SHIPPED | OUTPATIENT
Start: 2022-03-01 | End: 2023-02-01

## 2022-03-01 NOTE — TELEPHONE ENCOUNTER
Requested Prescriptions     Pending Prescriptions Disp Refills   • omeprazole (PRILOSEC) 40 MG delayed-release capsule [Pharmacy Med Name: Omeprazole 40 MG Oral Capsule Delayed Release] 90 Capsule 3     Sig: Take 1 Capsule by mouth every day.       DANIEL Gallegos.

## 2022-03-08 ENCOUNTER — OFFICE VISIT (OUTPATIENT)
Dept: MEDICAL GROUP | Facility: PHYSICIAN GROUP | Age: 57
End: 2022-03-08
Payer: MEDICARE

## 2022-03-08 VITALS
BODY MASS INDEX: 25.61 KG/M2 | DIASTOLIC BLOOD PRESSURE: 84 MMHG | HEART RATE: 81 BPM | TEMPERATURE: 97.4 F | WEIGHT: 169 LBS | HEIGHT: 68 IN | OXYGEN SATURATION: 95 % | SYSTOLIC BLOOD PRESSURE: 130 MMHG

## 2022-03-08 DIAGNOSIS — M25.511 ACUTE PAIN OF RIGHT SHOULDER: ICD-10-CM

## 2022-03-08 PROCEDURE — 99213 OFFICE O/P EST LOW 20 MIN: CPT | Performed by: NURSE PRACTITIONER

## 2022-03-08 ASSESSMENT — FIBROSIS 4 INDEX: FIB4 SCORE: 1.21

## 2022-03-08 NOTE — ASSESSMENT & PLAN NOTE
New problem to examiner.  Patient reports that in the beginning of January 2022 when she started to notice some right shoulder/upper arm pain.  Right around that time she did have a fall, she was walking in [place] with her dad after a snowfall and tripped in a hole that she could not see due to the snow causing her to fall forward and catch herself with both hands, she did scrape both hands, but does not recall her shoulder hurting at that time.  Reports that the pain seems to be worse at night and makes it so that she is unable to lie on her right side for longer than 2 minutes due to the pain.  She is able to lift her arm straight up without difficulty, but does notice increased pain down the front of her biceps when crossing her right arm across her body or with abduction. She continues Celebrex as previously prescribed, not sure if it is specifically helping her right shoulder.  Initially, she alternated ice and heat to the area as well as applying a menthol balm.  She is right-handed, so using her right upper extremity is unavoidable.  She does note some popping in the shoulder itself as well.

## 2022-03-09 DIAGNOSIS — E78.2 MIXED HYPERLIPIDEMIA: ICD-10-CM

## 2022-03-09 NOTE — PROGRESS NOTES
CC:   Chief Complaint   Patient presents with   • Shoulder Pain     Right shoulder pain for about 6 weeks.     HISTORY OF THE PRESENT ILLNESS: Patient is a 56 y.o. female. This pleasant patient is here today to discuss right shoulder pain.    Health Maintenance: Reviewed    Acute pain of right shoulder  New problem to examiner.  Patient reports that in the beginning of January 2022 when she started to notice some right shoulder/upper arm pain.  Right around that time she did have a fall, she was walking in [place] with her dad after a snowfall and tripped in a hole that she could not see due to the snow causing her to fall forward and catch herself with both hands, she did scrape both hands, but does not recall her shoulder hurting at that time.  Reports that the pain seems to be worse at night and makes it so that she is unable to lie on her right side for longer than 2 minutes due to the pain.  She is able to lift her arm straight up without difficulty, but does notice increased pain down the front of her biceps when crossing her right arm across her body or with abduction. She continues Celebrex as previously prescribed, not sure if it is specifically helping her right shoulder.  Initially, she alternated ice and heat to the area as well as applying a menthol balm.  She is right-handed, so using her right upper extremity is unavoidable.  She does note some popping in the shoulder itself as well.    Allergies: Lisinopril  Current Outpatient Medications Ordered in Epic   Medication Sig Dispense Refill   • omeprazole (PRILOSEC) 40 MG delayed-release capsule Take 1 Capsule by mouth every day. 90 Capsule 3   • levothyroxine (SYNTHROID) 100 MCG Tab TAKE 1 TABLET BY MOUTH IN  THE MORNING ON AN EMPTY  STOMACH 90 Tablet 1   • buPROPion (WELLBUTRIN XL) 300 MG XL tablet      • sertraline (ZOLOFT) 100 MG Tab      • Semaglutide,0.25 or 0.5MG/DOS, (OZEMPIC, 0.25 OR 0.5 MG/DOSE,) 2 MG/1.5ML Solution Pen-injector Inject 0.5 mg under  the skin every 7 days. 6 mL 3   • gabapentin (NEURONTIN) 100 MG Cap TAKE 2 CAPSULES BY MOUTH AT BEDTIME. 180 Capsule 1   • rosuvastatin (CRESTOR) 10 MG Tab TAKE 1 TABLET BY MOUTH EVERY DAY IN THE EVENING 90 Tablet 1   • celecoxib (CELEBREX) 100 MG Cap TAKE 1 CAPSULE BY MOUTH TWICE A  Capsule 3   • buPROPion (WELLBUTRIN XL) 150 MG XL tablet Take 150 mg by mouth every morning.     • glucose blood (CONTOUR NEXT TEST) strip 1 Strip by Other route every day. Use to test blood sugar 1 time daily 100 Strip 3   • lamotrigine (LAMICTAL) 200 MG tablet Take 200 mg by mouth every day.     • sumatriptan (IMITREX) 100 MG tablet Take 1 Tab by mouth Once PRN. 10 Tab 11   • traZODone (DESYREL) 100 MG Tab TAKE 1/2 TO 1 TABLET BY  MOUTH AT BEDTIME AS NEEDED  FOR SLEEP 90 Tab 0     No current Epic-ordered facility-administered medications on file.     Past Medical History:   Diagnosis Date   • ADD (attention deficit disorder)    • Bipolar 1 disorder (HCC)    • Bipolar disorder (HCC)    • Cervical cancer (HCC)    • Chronic back pain    • Depression    • Diabetes (HCC)    • Insomnia    • Memory loss due to medical condition     Related to ECT therapy for depression   • Migraine    • Thyroid disease     hypo     Past Surgical History:   Procedure Laterality Date   • NH BREAST AUGMENTATION WITH IMPLANT     • ABDOMINAL HYSTERECTOMY TOTAL      total   • PRIMARY C SECTION     • TOE ARTHROPLASTY Right     cut tendons, fused joints. last surgery removed last joint of all toes except for big toe     Social History     Tobacco Use   • Smoking status: Former Smoker     Packs/day: 1.00     Years: 38.00     Pack years: 38.00     Types: Cigarettes     Start date: 1978     Quit date: 2016     Years since quittin.5   • Smokeless tobacco: Never Used   Vaping Use   • Vaping Use: Never used   Substance Use Topics   • Alcohol use: Not Currently     Comment: holidays   • Drug use: No     Social History     Social History  "Narrative   • Not on file     Family History   Problem Relation Age of Onset   • Bipolar disorder Father    • Alcohol abuse Father         ETOH   • Other Father         Cirrhosis   • Diabetes Mother    • Hypertension Mother    • Other Mother         gilberts syndrome   • Alcohol abuse Sister    • Psychiatric Illness Sister    • Alcohol abuse Maternal Grandmother    • Other Maternal Grandmother         Brain Tumor   • Diabetes Maternal Grandfather    • Alzheimer's Disease Maternal Grandfather    • Cancer Maternal Grandfather         Skin   • Cancer Maternal Aunt         stomach   • Diabetes Maternal Uncle    • Alcohol abuse Maternal Uncle    • Stroke Maternal Uncle    • Diabetes Sister    • Hyperlipidemia Sister    • Anxiety disorder Sister    • Other Sister         benign uterine tumor   • Obesity Sister    • Obesity Sister    • Alcohol/Drug Maternal Uncle         prescription narcotics   • Cancer Maternal Uncle      ROS:   See HPI      Exam: /84 (BP Location: Right arm, Patient Position: Sitting, BP Cuff Size: Adult)   Pulse 81   Temp 36.3 °C (97.4 °F) (Temporal)   Ht 1.727 m (5' 8\")   Wt 76.7 kg (169 lb)   SpO2 95%  Body mass index is 25.7 kg/m².    General: Well nourished, well developed female in NAD, awake and conversant.  Eyes: Normal conjunctiva, anicteric.  Round symmetrical pupils.  ENT: Hearing grossly intact.  No nasal discharge.  Neck: Neck is supple.  No masses or thyromegaly.  CV: No lower extremity edema.  Respiratory: Respirations are nonlabored.  No wheezing.  Abdomen: Non-Distended.  Skin: Warm.  No rashes or ulcers.  MSK: Normal ambulation.  No clubbing or cyanosis.  Right Shoulder: shoulder appears normal, non-specific diffuse tenderness about the shoulder, tenderness over the glenohumeral joint, distal neuromuscular exam negative, has tenderness about the glenohumeral joint, does have full ROM, does not have  positive impingement sign, sensory exam normal, motor exam normal, radial " pulse intact.  Neuro: Sensation and CN II-XII grossly normal.  Psych: Alert and oriented.  Cooperative, appropriate mood and affect, normal judgment.     Assessment/Plan:  1. Acute pain of right shoulder  New to examiner.  Plan to have patient complete right shoulder x-ray and start physical therapy.  If pain worsens or does not improve with physical therapy will consider right shoulder MRI.  - DX-SHOULDER 2+ RIGHT; Future  - Referral to Physical Therapy     Educated in proper administration of medication(s) ordered today including safety, possible SE, risks, benefits, rationale and alternatives to therapy.   Supportive care, differential diagnoses, and indications for immediate follow-up discussed with patient.    Pathogenesis of diagnosis discussed including typical length and natural progression.    Instructed to return to clinic or nearest emergency department for any change in condition, further concerns, or worsening of symptoms.  Patient states understanding of the plan of care and discharge instructions.    Return if symptoms worsen or fail to improve.    Please note that this dictation was created using voice recognition software. I have made every reasonable attempt to correct obvious errors, but I expect that there are errors of grammar and possibly content that I did not discover before finalizing the note.

## 2022-03-10 RX ORDER — ROSUVASTATIN CALCIUM 10 MG/1
TABLET, COATED ORAL
Qty: 90 TABLET | Refills: 3 | Status: SHIPPED | OUTPATIENT
Start: 2022-03-10 | End: 2023-01-25 | Stop reason: SDUPTHER

## 2022-03-10 NOTE — TELEPHONE ENCOUNTER
Requested Prescriptions     Pending Prescriptions Disp Refills   • rosuvastatin (CRESTOR) 10 MG Tab [Pharmacy Med Name: ROSUVASTATIN CALCIUM 10 MG TAB] 90 Tablet 3     Sig: TAKE 1 TABLET BY MOUTH EVERY DAY IN THE EVENING       DANIEL Gallegos.

## 2022-03-11 ENCOUNTER — HOSPITAL ENCOUNTER (OUTPATIENT)
Dept: RADIOLOGY | Facility: MEDICAL CENTER | Age: 57
End: 2022-03-11
Attending: NURSE PRACTITIONER
Payer: MEDICARE

## 2022-03-11 DIAGNOSIS — M25.511 ACUTE PAIN OF RIGHT SHOULDER: ICD-10-CM

## 2022-03-11 PROCEDURE — 73030 X-RAY EXAM OF SHOULDER: CPT | Mod: RT

## 2022-03-29 DIAGNOSIS — M25.511 ACUTE PAIN OF RIGHT SHOULDER: ICD-10-CM

## 2022-05-05 DIAGNOSIS — M25.511 ACUTE PAIN OF RIGHT SHOULDER: ICD-10-CM

## 2022-05-06 NOTE — PROGRESS NOTES
1. Acute pain of right shoulder  Patient has completed 8 weeks of physical therapy twice weekly, has had some improvement in right shoulder pain, but not significant.  Referral to physical therapy to continue physical therapy and plan to have patient complete right shoulder MRI.  - MR-SHOULDER-W/O RIGHT; Future  - Referral to Physical Therapy

## 2022-05-19 ENCOUNTER — HOSPITAL ENCOUNTER (OUTPATIENT)
Dept: RADIOLOGY | Facility: MEDICAL CENTER | Age: 57
End: 2022-05-19
Attending: NURSE PRACTITIONER
Payer: MEDICARE

## 2022-05-19 DIAGNOSIS — M25.511 ACUTE PAIN OF RIGHT SHOULDER: ICD-10-CM

## 2022-05-19 PROCEDURE — 73221 MRI JOINT UPR EXTREM W/O DYE: CPT | Mod: RT,ME

## 2022-05-24 ENCOUNTER — OFFICE VISIT (OUTPATIENT)
Dept: MEDICAL GROUP | Facility: PHYSICIAN GROUP | Age: 57
End: 2022-05-24
Payer: MEDICARE

## 2022-05-24 VITALS
HEIGHT: 68 IN | TEMPERATURE: 96.5 F | HEART RATE: 93 BPM | BODY MASS INDEX: 25.16 KG/M2 | DIASTOLIC BLOOD PRESSURE: 68 MMHG | SYSTOLIC BLOOD PRESSURE: 114 MMHG | OXYGEN SATURATION: 93 % | WEIGHT: 166 LBS

## 2022-05-24 DIAGNOSIS — M75.01 ADHESIVE CAPSULITIS OF RIGHT SHOULDER: ICD-10-CM

## 2022-05-24 PROCEDURE — 99213 OFFICE O/P EST LOW 20 MIN: CPT | Performed by: NURSE PRACTITIONER

## 2022-05-24 ASSESSMENT — FIBROSIS 4 INDEX: FIB4 SCORE: 1.21

## 2022-05-24 NOTE — ASSESSMENT & PLAN NOTE
Patient presents today to review MRI results of her right shoulder. Patient continues to take Celebrex daily and has been participating in physical therapy, reporting that it does not seem to be beneficial and that she continues to experience pain that is affecting her activities of daily living.  Results as follows:  1.  Tendinopathy of the supraspinatus and infraspinatus tendons. No full-thickness tear or tendon retraction is identified.   2.  Thickening of the inferior glenohumeral ligament with mildly increased T2 signal. Findings suggest a component of adhesive capsulitis.  3.  Mild degenerative change in the acromioclavicular joint with a tiny subacromial enthesophyte and mild lateral downsloping

## 2022-06-02 ENCOUNTER — APPOINTMENT (OUTPATIENT)
Dept: PHYSICAL THERAPY | Facility: REHABILITATION | Age: 57
End: 2022-06-02
Attending: NURSE PRACTITIONER
Payer: MEDICARE

## 2022-06-09 ENCOUNTER — APPOINTMENT (OUTPATIENT)
Dept: PHYSICAL THERAPY | Facility: REHABILITATION | Age: 57
End: 2022-06-09
Attending: NURSE PRACTITIONER
Payer: MEDICARE

## 2022-06-16 ENCOUNTER — APPOINTMENT (OUTPATIENT)
Dept: PHYSICAL THERAPY | Facility: REHABILITATION | Age: 57
End: 2022-06-16
Attending: NURSE PRACTITIONER
Payer: MEDICARE

## 2022-06-23 ENCOUNTER — APPOINTMENT (OUTPATIENT)
Dept: PHYSICAL THERAPY | Facility: REHABILITATION | Age: 57
End: 2022-06-23
Attending: NURSE PRACTITIONER
Payer: MEDICARE

## 2022-06-30 ENCOUNTER — APPOINTMENT (OUTPATIENT)
Dept: PHYSICAL THERAPY | Facility: REHABILITATION | Age: 57
End: 2022-06-30
Payer: MEDICARE

## 2022-07-07 ENCOUNTER — APPOINTMENT (OUTPATIENT)
Dept: PHYSICAL THERAPY | Facility: REHABILITATION | Age: 57
End: 2022-07-07
Attending: NURSE PRACTITIONER
Payer: MEDICARE

## 2022-07-14 ENCOUNTER — APPOINTMENT (OUTPATIENT)
Dept: PHYSICAL THERAPY | Facility: REHABILITATION | Age: 57
End: 2022-07-14
Attending: NURSE PRACTITIONER
Payer: MEDICARE

## 2022-07-22 DIAGNOSIS — G89.29 CHRONIC BILATERAL LOW BACK PAIN WITHOUT SCIATICA: ICD-10-CM

## 2022-07-22 DIAGNOSIS — M54.50 CHRONIC BILATERAL LOW BACK PAIN WITHOUT SCIATICA: ICD-10-CM

## 2022-07-24 RX ORDER — CELECOXIB 100 MG/1
CAPSULE ORAL
Qty: 180 CAPSULE | Refills: 3 | Status: SHIPPED | OUTPATIENT
Start: 2022-07-24 | End: 2023-08-29

## 2022-07-28 ENCOUNTER — APPOINTMENT (OUTPATIENT)
Dept: PHYSICAL THERAPY | Facility: REHABILITATION | Age: 57
End: 2022-07-28
Attending: NURSE PRACTITIONER
Payer: MEDICARE

## 2022-07-28 DIAGNOSIS — E03.9 ACQUIRED HYPOTHYROIDISM: ICD-10-CM

## 2022-07-30 RX ORDER — LEVOTHYROXINE SODIUM 0.1 MG/1
TABLET ORAL
Qty: 90 TABLET | Refills: 0 | Status: SHIPPED | OUTPATIENT
Start: 2022-07-30 | End: 2022-09-08 | Stop reason: SDUPTHER

## 2022-07-30 NOTE — TELEPHONE ENCOUNTER
Requested Prescriptions     Pending Prescriptions Disp Refills   • levothyroxine (SYNTHROID) 100 MCG Tab [Pharmacy Med Name: Levothyroxine Sodium 100 MCG Oral Tablet] 90 Tablet 0     Sig: TAKE 1 TABLET BY MOUTH IN  THE MORNING ON AN EMPTY  STOMACH       DANIEL Gallegos.

## 2022-08-04 ENCOUNTER — APPOINTMENT (OUTPATIENT)
Dept: PHYSICAL THERAPY | Facility: REHABILITATION | Age: 57
End: 2022-08-04
Attending: NURSE PRACTITIONER
Payer: MEDICARE

## 2022-09-03 DIAGNOSIS — G62.9 PERIPHERAL POLYNEUROPATHY: ICD-10-CM

## 2022-09-03 DIAGNOSIS — E11.42 TYPE 2 DIABETES MELLITUS WITH DIABETIC POLYNEUROPATHY, WITHOUT LONG-TERM CURRENT USE OF INSULIN (HCC): ICD-10-CM

## 2022-09-04 RX ORDER — GABAPENTIN 100 MG/1
200 CAPSULE ORAL
Qty: 180 CAPSULE | Refills: 1 | Status: SHIPPED | OUTPATIENT
Start: 2022-09-04 | End: 2023-04-27

## 2022-09-04 NOTE — TELEPHONE ENCOUNTER
Requested Prescriptions     Pending Prescriptions Disp Refills   • gabapentin (NEURONTIN) 100 MG Cap [Pharmacy Med Name: GABAPENTIN 100 MG CAPSULE] 180 Capsule 1     Sig: TAKE 2 CAPSULES BY MOUTH AT BEDTIME       DANIEL Gallegos.

## 2022-09-06 ENCOUNTER — HOSPITAL ENCOUNTER (OUTPATIENT)
Dept: LAB | Facility: MEDICAL CENTER | Age: 57
End: 2022-09-06
Attending: NURSE PRACTITIONER
Payer: MEDICARE

## 2022-09-06 DIAGNOSIS — E66.3 OVERWEIGHT (BMI 25.0-29.9): ICD-10-CM

## 2022-09-06 DIAGNOSIS — Z00.00 ROUTINE HEALTH MAINTENANCE: ICD-10-CM

## 2022-09-06 DIAGNOSIS — E03.9 ACQUIRED HYPOTHYROIDISM: ICD-10-CM

## 2022-09-06 DIAGNOSIS — E11.42 TYPE 2 DIABETES MELLITUS WITH DIABETIC POLYNEUROPATHY, WITHOUT LONG-TERM CURRENT USE OF INSULIN (HCC): ICD-10-CM

## 2022-09-06 LAB
ALBUMIN SERPL BCP-MCNC: 4.7 G/DL (ref 3.2–4.9)
ALBUMIN/GLOB SERPL: 2.6 G/DL
ALP SERPL-CCNC: 74 U/L (ref 30–99)
ALT SERPL-CCNC: 56 U/L (ref 2–50)
ANION GAP SERPL CALC-SCNC: 8 MMOL/L (ref 7–16)
AST SERPL-CCNC: 28 U/L (ref 12–45)
BASOPHILS # BLD AUTO: 1 % (ref 0–1.8)
BASOPHILS # BLD: 0.05 K/UL (ref 0–0.12)
BILIRUB SERPL-MCNC: 0.5 MG/DL (ref 0.1–1.5)
BUN SERPL-MCNC: 8 MG/DL (ref 8–22)
CALCIUM SERPL-MCNC: 9.4 MG/DL (ref 8.5–10.5)
CHLORIDE SERPL-SCNC: 102 MMOL/L (ref 96–112)
CHOLEST SERPL-MCNC: 126 MG/DL (ref 100–199)
CO2 SERPL-SCNC: 28 MMOL/L (ref 20–33)
CREAT SERPL-MCNC: 0.72 MG/DL (ref 0.5–1.4)
CREAT UR-MCNC: 81.81 MG/DL
EOSINOPHIL # BLD AUTO: 0.14 K/UL (ref 0–0.51)
EOSINOPHIL NFR BLD: 2.8 % (ref 0–6.9)
ERYTHROCYTE [DISTWIDTH] IN BLOOD BY AUTOMATED COUNT: 44.5 FL (ref 35.9–50)
EST. AVERAGE GLUCOSE BLD GHB EST-MCNC: 105 MG/DL
GFR SERPLBLD CREATININE-BSD FMLA CKD-EPI: 96 ML/MIN/1.73 M 2
GLOBULIN SER CALC-MCNC: 1.8 G/DL (ref 1.9–3.5)
GLUCOSE SERPL-MCNC: 116 MG/DL (ref 65–99)
HBA1C MFR BLD: 5.3 % (ref 4–5.6)
HCT VFR BLD AUTO: 46 % (ref 37–47)
HDLC SERPL-MCNC: 52 MG/DL
HGB BLD-MCNC: 15.6 G/DL (ref 12–16)
IMM GRANULOCYTES # BLD AUTO: 0.02 K/UL (ref 0–0.11)
IMM GRANULOCYTES NFR BLD AUTO: 0.4 % (ref 0–0.9)
LDLC SERPL CALC-MCNC: 54 MG/DL
LYMPHOCYTES # BLD AUTO: 1.09 K/UL (ref 1–4.8)
LYMPHOCYTES NFR BLD: 21.5 % (ref 22–41)
MCH RBC QN AUTO: 31.8 PG (ref 27–33)
MCHC RBC AUTO-ENTMCNC: 33.9 G/DL (ref 33.6–35)
MCV RBC AUTO: 93.7 FL (ref 81.4–97.8)
MICROALBUMIN UR-MCNC: <1.2 MG/DL
MICROALBUMIN/CREAT UR: NORMAL MG/G (ref 0–30)
MONOCYTES # BLD AUTO: 0.36 K/UL (ref 0–0.85)
MONOCYTES NFR BLD AUTO: 7.1 % (ref 0–13.4)
NEUTROPHILS # BLD AUTO: 3.42 K/UL (ref 2–7.15)
NEUTROPHILS NFR BLD: 67.2 % (ref 44–72)
NRBC # BLD AUTO: 0 K/UL
NRBC BLD-RTO: 0 /100 WBC
PLATELET # BLD AUTO: 173 K/UL (ref 164–446)
PMV BLD AUTO: 10.5 FL (ref 9–12.9)
POTASSIUM SERPL-SCNC: 4.1 MMOL/L (ref 3.6–5.5)
PROT SERPL-MCNC: 6.5 G/DL (ref 6–8.2)
RBC # BLD AUTO: 4.91 M/UL (ref 4.2–5.4)
SODIUM SERPL-SCNC: 138 MMOL/L (ref 135–145)
TRIGL SERPL-MCNC: 102 MG/DL (ref 0–149)
TSH SERPL DL<=0.005 MIU/L-ACNC: 1.25 UIU/ML (ref 0.38–5.33)
WBC # BLD AUTO: 5.1 K/UL (ref 4.8–10.8)

## 2022-09-06 PROCEDURE — 82043 UR ALBUMIN QUANTITATIVE: CPT

## 2022-09-06 PROCEDURE — 83036 HEMOGLOBIN GLYCOSYLATED A1C: CPT | Mod: GA

## 2022-09-06 PROCEDURE — 80053 COMPREHEN METABOLIC PANEL: CPT

## 2022-09-06 PROCEDURE — 36415 COLL VENOUS BLD VENIPUNCTURE: CPT

## 2022-09-06 PROCEDURE — 80061 LIPID PANEL: CPT

## 2022-09-06 PROCEDURE — 84443 ASSAY THYROID STIM HORMONE: CPT

## 2022-09-06 PROCEDURE — 85025 COMPLETE CBC W/AUTO DIFF WBC: CPT

## 2022-09-06 PROCEDURE — 82570 ASSAY OF URINE CREATININE: CPT

## 2022-09-08 ENCOUNTER — OFFICE VISIT (OUTPATIENT)
Dept: MEDICAL GROUP | Facility: PHYSICIAN GROUP | Age: 57
End: 2022-09-08
Payer: MEDICARE

## 2022-09-08 VITALS
HEART RATE: 81 BPM | OXYGEN SATURATION: 91 % | SYSTOLIC BLOOD PRESSURE: 110 MMHG | BODY MASS INDEX: 24.4 KG/M2 | HEIGHT: 68 IN | DIASTOLIC BLOOD PRESSURE: 68 MMHG | TEMPERATURE: 96.9 F | WEIGHT: 161 LBS

## 2022-09-08 DIAGNOSIS — Z00.00 MEDICARE ANNUAL WELLNESS VISIT, INITIAL: ICD-10-CM

## 2022-09-08 DIAGNOSIS — E78.2 MIXED HYPERLIPIDEMIA: ICD-10-CM

## 2022-09-08 DIAGNOSIS — E03.9 ACQUIRED HYPOTHYROIDISM: ICD-10-CM

## 2022-09-08 DIAGNOSIS — E66.3 OVERWEIGHT (BMI 25.0-29.9): ICD-10-CM

## 2022-09-08 DIAGNOSIS — Z91.81 RISK FOR FALLS: ICD-10-CM

## 2022-09-08 DIAGNOSIS — M75.01 ADHESIVE CAPSULITIS OF RIGHT SHOULDER: ICD-10-CM

## 2022-09-08 DIAGNOSIS — I10 ESSENTIAL HYPERTENSION: ICD-10-CM

## 2022-09-08 DIAGNOSIS — Z12.31 ENCOUNTER FOR SCREENING MAMMOGRAM FOR BREAST CANCER: ICD-10-CM

## 2022-09-08 DIAGNOSIS — E11.42 TYPE 2 DIABETES MELLITUS WITH DIABETIC POLYNEUROPATHY, WITHOUT LONG-TERM CURRENT USE OF INSULIN (HCC): ICD-10-CM

## 2022-09-08 DIAGNOSIS — Z23 NEED FOR VACCINATION: ICD-10-CM

## 2022-09-08 PROCEDURE — 92250 FUNDUS PHOTOGRAPHY W/I&R: CPT | Mod: TC | Performed by: NURSE PRACTITIONER

## 2022-09-08 PROCEDURE — G0439 PPPS, SUBSEQ VISIT: HCPCS | Mod: 25 | Performed by: NURSE PRACTITIONER

## 2022-09-08 PROCEDURE — G0009 ADMIN PNEUMOCOCCAL VACCINE: HCPCS | Performed by: NURSE PRACTITIONER

## 2022-09-08 PROCEDURE — 90677 PCV20 VACCINE IM: CPT | Performed by: NURSE PRACTITIONER

## 2022-09-08 RX ORDER — LEVOTHYROXINE SODIUM 0.1 MG/1
100 TABLET ORAL
Qty: 90 TABLET | Refills: 3 | Status: SHIPPED | OUTPATIENT
Start: 2022-09-08 | End: 2022-10-25

## 2022-09-08 ASSESSMENT — ENCOUNTER SYMPTOMS: GENERAL WELL-BEING: GOOD

## 2022-09-08 ASSESSMENT — PATIENT HEALTH QUESTIONNAIRE - PHQ9: CLINICAL INTERPRETATION OF PHQ2 SCORE: 0

## 2022-09-08 ASSESSMENT — FIBROSIS 4 INDEX: FIB4 SCORE: 1.25

## 2022-09-08 ASSESSMENT — ACTIVITIES OF DAILY LIVING (ADL): BATHING_REQUIRES_ASSISTANCE: 0

## 2022-09-08 NOTE — PROGRESS NOTES
Chief Complaint   Patient presents with    Annual Wellness Visit     HPI:  Frances Delgado is a 56 y.o. here for Medicare Annual Wellness Visit     Type 2 diabetes mellitus with diabetic polyneuropathy, without long-term current use of insulin ([facility])  Chronic, stable.  Continues Ozempic 0.5 mg weekly, denies side effects of the medication.  Occasionally checks her blood sugars and reports they are usually around 120, has had some in the low 100s.  She continues intermittent fasting and working on weight loss.    Acquired hypothyroidism  Chronic, stable.  Continues levothyroxine 100 mcg/day.  Due for updated labs in September 2023.    Adhesive capsulitis of right shoulder  Currently stable.  Was seen by orthopedics and received a steroid injection.  Has had significant relief of pain and range of motion.    Essential hypertension  Resolved.    Mixed hyperlipidemia  Chronic, stable.  Continues rosuvastatin 10 mg daily, denies side effects of the medication. Due for updated labs in September 2023.    Overweight (BMI 25.0-29.9)  Resolved.     Patient Active Problem List    Diagnosis Date Noted    Risk for falls 09/08/2022    Adhesive capsulitis of right shoulder 03/08/2022    Right leg weakness 04/16/2021    Restless leg syndrome 05/27/2020    Onychomycosis 05/27/2020    Right leg numbness 12/26/2019    Lipoma of torso 07/29/2019    Right hand pain 05/13/2019    Type 2 diabetes mellitus with diabetic polyneuropathy, without long-term current use of insulin (HCC) 01/02/2019    Peripheral polyneuropathy 01/02/2019    History of smoking at least 1 pack per day for at least 30 years 01/02/2019    Chronic toe pain, bilateral 06/21/2018    Acquired hypothyroidism 03/29/2018    Gastroesophageal reflux disease without esophagitis 03/29/2018    DORENE (generalized anxiety disorder) 12/06/2017    Ganglion cyst 10/04/2017    Elevated LFTs 05/23/2017    Chronic bilateral low back pain without sciatica 05/23/2017    Mixed  hyperlipidemia 05/09/2017    Bipolar II disorder (HCC) 12/21/2016    Attention deficit hyperactivity disorder (ADHD) 12/21/2016     Current Outpatient Medications   Medication Sig Dispense Refill    tetanus-dipth-acell pertussis (ADACEL) 5-2-15.5 LF-MCG/0.5 Suspension Inject 0.5 mL into the shoulder, thigh, or buttocks one time for 1 dose. 0.5 mL 0    Zoster Vac Recomb Adjuvanted (SHINGRIX) 50 MCG/0.5ML Recon Susp Inject 0.5 mL into the shoulder, thigh, or buttocks one time for 1 dose. 0.5 mL 0    levothyroxine (SYNTHROID) 100 MCG Tab Take 1 Tablet by mouth every morning on an empty stomach. 90 Tablet 3    gabapentin (NEURONTIN) 100 MG Cap TAKE 2 CAPSULES BY MOUTH AT BEDTIME 180 Capsule 1    celecoxib (CELEBREX) 100 MG Cap TAKE 1 CAPSULE BY MOUTH TWICE A  Capsule 3    rosuvastatin (CRESTOR) 10 MG Tab TAKE 1 TABLET BY MOUTH EVERY DAY IN THE EVENING 90 Tablet 3    omeprazole (PRILOSEC) 40 MG delayed-release capsule Take 1 Capsule by mouth every day. 90 Capsule 3    buPROPion (WELLBUTRIN XL) 300 MG XL tablet       sertraline (ZOLOFT) 100 MG Tab       Semaglutide,0.25 or 0.5MG/DOS, (OZEMPIC, 0.25 OR 0.5 MG/DOSE,) 2 MG/1.5ML Solution Pen-injector Inject 0.5 mg under the skin every 7 days. 6 mL 3    buPROPion (WELLBUTRIN XL) 150 MG XL tablet Take 150 mg by mouth every morning.      glucose blood (CONTOUR NEXT TEST) strip 1 Strip by Other route every day. Use to test blood sugar 1 time daily 100 Strip 3    lamotrigine (LAMICTAL) 200 MG tablet Take 200 mg by mouth every day.      sumatriptan (IMITREX) 100 MG tablet Take 1 Tab by mouth Once PRN. 10 Tab 11    traZODone (DESYREL) 100 MG Tab TAKE 1/2 TO 1 TABLET BY  MOUTH AT BEDTIME AS NEEDED  FOR SLEEP 90 Tab 0     No current facility-administered medications for this visit.          Current supplements as per medication list.     Allergies: Lisinopril    Current social contact/activities: Pickle ball    She  reports that she quit smoking about 6 years ago. Her smoking  use included cigarettes. She started smoking about 44 years ago. She has a 38.00 pack-year smoking history. She has never used smokeless tobacco. She reports that she does not currently use alcohol. She reports that she does not use drugs.  Counseling given: Yes    DPA/Advanced Directive:  Patient has Advanced Directive on file.     ROS:    Gait: Uses no assistive device  Ostomy: No  Other tubes: No  Amputations: No  Chronic oxygen use: No  Last eye exam: 2021  Wears hearing aids: No   : Denies any urinary leakage during the last 6 months    Screening:  Depression Screening  Little interest or pleasure in doing things?  0 - not at all  Feeling down, depressed , or hopeless? 0 - not at all  Patient Health Questionnaire Score: 0     If depressive symptoms identified deferred to follow up visit unless specifically addressed in assessment and plan.    Interpretation of PHQ-9 Total Score   Score Severity   1-4 No Depression   5-9 Mild Depression   10-14 Moderate Depression   15-19 Moderately Severe Depression   20-27 Severe Depression    Screening for Cognitive Impairment  Three Minute Recall (daughter, heaven, mountain) 3/3    Fidencio clock face with all 12 numbers and set the hands to show 10 past 11.  Yes    Cognitive concerns identified deferred for follow up unless specifically addressed in assessment and plan.    Fall Risk Assessment  Has the patient had two or more falls in the last year or any fall with injury in the last year?  Yes    Safety Assessment  Throw rugs on floor.  Yes  Handrails on all stairs.  No  Good lighting in all hallways.  No  Difficulty hearing.  No  Patient counseled about all safety risks that were identified.    Functional Assessment ADLs  Are there any barriers preventing you from cooking for yourself or meeting nutritional needs?  No.    Are there any barriers preventing you from driving safely or obtaining transportation?  No.    Are there any barriers preventing you from using a  telephone or calling for help?  No.    Are there any barriers preventing you from shopping?  No.    Are there any barriers preventing you from taking care of your own finances?  No.    Are there any barriers preventing you from managing your medications?  No.    Are there any barriers preventing you from showering, bathing or dressing yourself?No.    Are you currently engaging in any exercise or physical activity?  Yes.     What is your perception of your health?  Good.    Health Maintenance Summary            Overdue - IMM DTaP/Tdap/Td Vaccine (1 - Tdap) Overdue - never done      No completion history exists for this topic.              Ordered - IMM PNEUMOCOCCAL VACCINE: 0-64 Years (2 - PCV) Ordered on 9/8/2022 01/02/2019  Imm Admin: Pneumococcal polysaccharide vaccine (PPSV-23)              Ordered - RETINAL SCREENING (Yearly) Ordered on 9/8/2022 05/13/2019  POCT Retinal Eye Exam    11/08/2017  REFERRAL FOR RETINAL SCREENING EXAM              Overdue - IMM ZOSTER VACCINES (2 of 2) Overdue since 2/14/2022 12/20/2021  Imm Admin: Zoster Vaccine Recombinant (RZV) (SHINGRIX)              Overdue - COVID-19 Vaccine (4 - Booster for Moderna series) Overdue since 4/20/2022 12/20/2021  Imm Admin: PFIZER PURPLE CAP SARS-COV-2 VACCINATION (12+)    04/17/2021  Imm Admin: MODERNA SARS-COV-2 VACCINE (12+)    03/19/2021  Imm Admin: MODERNA SARS-COV-2 VACCINE (12+)              Ordered - MAMMOGRAM (Yearly) Ordered on 9/8/2022 06/01/2021  MA-SCREENING MAMMO BILAT W/IMPLANTS W/FERDINAND W/CAD    02/04/2020  MA-MAMMO SCREEN BILAT IMPLANTS FERDINAND CAD    04/16/2018  MA-MAMMO SCREEN BILAT IMPLANTS FERDINAND CAD    11/08/2016  MA-DIAGNOSTIC DIGITAL MAMMO-UNILAT RIGHT    10/24/2016  MA-SCREEN MAMMO W/IMPLANTS W/CAD-BILAT              Overdue - IMM INFLUENZA (1) Overdue since 9/1/2022 09/27/2021  Imm Admin: Influenza Vaccine Quad Inj (Pf)    10/01/2020  Imm Admin: Influenza Vac Subunit Quad Inj (Pf)    12/26/2019  Imm  Admin: Influenza Vaccine Quad Inj (Pf)    01/02/2019  Imm Admin: Influenza Vaccine Quad Inj (Pf)    09/16/2016  Imm Admin: Influenza Vaccine Quad Inj (Preserved)              A1C SCREENING (Every 6 Months) Next due on 3/6/2023      09/06/2022  HEMOGLOBIN A1C    01/18/2022  POCT  A1C    09/21/2021  HEMOGLOBIN A1C    03/12/2021  HEMOGLOBIN A1C    05/21/2020  HEMOGLOBIN A1C    Only the first 5 history entries have been loaded, but more history exists.              FASTING LIPID PROFILE (Yearly) Next due on 9/6/2023 09/06/2022  Lipid Profile    09/21/2021  Lipid Profile    03/12/2021  Lipid Profile    05/21/2020  Lipid Profile    12/28/2018  Lipid Profile    Only the first 5 history entries have been loaded, but more history exists.              URINE ACR / MICROALBUMIN (Yearly) Next due on 9/6/2023 09/06/2022  MICROALBUMIN CREAT RATIO URINE    03/12/2021  MICROALBUMIN CREAT RATIO URINE    05/21/2020  MICROALBUMIN CREAT RATIO URINE (LAB COLLECT)    05/13/2019  MICROALBUMIN CREAT RATIO URINE (CLINIC COLLECT)    03/26/2018  MICROALBUMIN CREAT RATIO URINE    Only the first 5 history entries have been loaded, but more history exists.              SERUM CREATININE (Yearly) Next due on 9/6/2023 09/06/2022  Comp Metabolic Panel    03/12/2021  Comp Metabolic Panel    06/08/2020  Comp Metabolic Panel    05/07/2019  COMP METABOLIC PANEL    12/28/2018  COMP METABOLIC PANEL    Only the first 5 history entries have been loaded, but more history exists.              DIABETES MONOFILAMENT / LE EXAM (Yearly) Next due on 9/8/2023 09/08/2022  Diabetic Monofilament LE Exam    09/27/2021  Diabetic Monofilament Lower Extremity Exam    09/27/2021  [employer]: WORKFLOW - DIABETES - DIABETIC FOOT EXAM PERFORMED    05/13/2019  Diabetic Monofilament LE Exam              Annual Wellness Visit (Every 366 Days) Next due on 9/9/2023 09/08/2022  Visit Dx: Medicare annual wellness visit, initial    09/08/2022  Initial Annual  Wellness Visit - Includes PPPS ()              COLORECTAL CANCER SCREENING (COLONOSCOPY - Preferred) Next due on 7/15/2026      07/15/2016  REFERRAL TO GI FOR COLONOSCOPY              HEPATITIS C SCREENING  Completed      2018  HEPATITIS PANEL ACUTE(4 COMPONENTS)              IMM MENINGOCOCCAL ACWY VACCINE (Series Information) Aged Out      No completion history exists for this topic.              Discontinued - PAP SMEAR  Discontinued      2018  THINPREP PAP WITH HPV    2018  PATHOLOGY GYN SPECIMEN              Discontinued - IMM HEP B VACCINE  Discontinued      No completion history exists for this topic.                  Patient Care Team:  CRISTINA Gallegos as PCP - General (Family Medicine)  Mele Frausto M.D. (Internal Medicine)    Social History     Tobacco Use    Smoking status: Former     Packs/day: 1.00     Years: 38.00     Pack years: 38.00     Types: Cigarettes     Start date: 1978     Quit date: 2016     Years since quittin.0    Smokeless tobacco: Never   Vaping Use    Vaping Use: Never used   Substance Use Topics    Alcohol use: Not Currently     Comment: holidays    Drug use: No     Family History   Problem Relation Age of Onset    Bipolar disorder Father     Alcohol abuse Father         ETOH    Other Father         Cirrhosis    Diabetes Mother     Hypertension Mother     Other Mother         gilberts syndrome    Alcohol abuse Sister     Psychiatric Illness Sister     Alcohol abuse Maternal Grandmother     Other Maternal Grandmother         Brain Tumor    Diabetes Maternal Grandfather     Alzheimer's Disease Maternal Grandfather     Cancer Maternal Grandfather         Skin    Cancer Maternal Aunt         stomach    Diabetes Maternal Uncle     Alcohol abuse Maternal Uncle     Stroke Maternal Uncle     Diabetes Sister     Hyperlipidemia Sister     Anxiety disorder Sister     Other Sister         benign uterine tumor    Obesity Sister     Obesity Sister      "Alcohol/Drug Maternal Uncle         prescription narcotics    Cancer Maternal Uncle      She  has a past medical history of ADD (attention deficit disorder), Bipolar 1 disorder (HCC), Bipolar disorder (HCC), Cervical cancer (HCC) (1984), Chronic back pain, Depression, Diabetes (HCC), Essential hypertension (5/9/2017), Insomnia, Memory loss due to medical condition, Migraine, Overweight (BMI 25.0-29.9) (1/18/2022), and Thyroid disease.   Past Surgical History:   Procedure Laterality Date    MT BREAST AUGMENTATION WITH IMPLANT  2004    ABDOMINAL HYSTERECTOMY TOTAL  2004    total    PRIMARY C SECTION      TOE ARTHROPLASTY Right     cut tendons, fused joints. last surgery removed last joint of all toes except for big toe     Exam:   /68 (BP Location: Left arm, Patient Position: Sitting, BP Cuff Size: Adult)   Pulse 81   Temp 36.1 °C (96.9 °F) (Temporal)   Ht 1.727 m (5' 8\")   Wt 73 kg (161 lb)   SpO2 91%  Body mass index is 24.48 kg/m².    Hearing good.    Dentition good  Alert, oriented in no acute distress.  Eye contact is good, speech goal directed, affect calm    General: Normal appearing. No distress.  HEENT: Normocephalic. Eyes conjunctiva clear lids without ptosis, pupils equal and reactive to light accommodation, ears normal shape and contour, canals are clear bilaterally, tympanic membranes are benign, nasal mucosa benign, oropharynx is without erythema, edema or exudates. Sinuses (frontal and maxillary) nontender to palpation.  Neck: Supple without JVD or bruit. Thyroid is not enlarged.  Pulmonary: Clear to ausculation.  Normal effort. No rales, rhonchi, or wheezing.  Cardiovascular: Regular rate and rhythm without murmur. Carotid and radial pulses are intact and equal bilaterally.  Abdomen: Soft, nontender, nondistended. Normal bowel sounds. Liver and spleen are not palpable.  Neurologic: Grossly nonfocal.  Lymph: No cervical, supraclavicular or axillary lymph nodes are palpable.  Skin: Warm and " dry.  No obvious lesions.  Musculoskeletal: Normal gait. No extremity cyanosis, clubbing, or edema.  Psych: Normal mood and affect. Alert and oriented x3. Judgment and insight is normal.     Monofilament testing with a 10 gram force: sensation intact: decreased bilaterally, left worse than right  Visual Inspection: Feet without maceration, ulcers, fissures.  Pedal pulses: intact bilaterally     Assessment and Plan. The following treatment and monitoring plan is recommended:   1. Medicare annual wellness visit, initial  - Initial Annual Wellness Visit - Includes PPPS ()    2. Type 2 diabetes mellitus with diabetic polyneuropathy, without long-term current use of insulin (HCC)  Chronic, stable.  DM2 A1c is at goal   Previously at goal: yes  Continue Ozempic 0.5 mg weekly.   Patient is on a statin.  Lipid panel due: September 2023  Microalbumin due: September 2023  Routine diabetic retinopathy screening: Completed today  Encouraged diet high in fruits, vegetables, and fiber. A diet low in salt, refined carbohydrates, cholesterol, saturated fat, and trans fatty acids.    Recommended a minimum of 30 minutes of moderate intensity aerobic exercise (eg, brisk walking) on five days each week. Or, 30 minutes of vigorous-intensity aerobic exercise (eg, jogging) on three days each week.    - POCT Retinal Eye Exam  - Diabetic Monofilament LE Exam    3. Acquired hypothyroidism  Chronic, stable.  Continue levothyroxine 100 mcg daily, refill sent to pharmacy. Due for updated labs in September 2023.  - levothyroxine (SYNTHROID) 100 MCG Tab; Take 1 Tablet by mouth every morning on an empty stomach.  Dispense: 90 Tablet; Refill: 3    4. Mixed hyperlipidemia  Chronic, stable.  Continue rosuvastatin 10 mg daily, does not need a refill at this time. Due for updated labs in September 2023.    5. Adhesive capsulitis of right shoulder  Improved after steroid injection with orthopedic.  Continue to follow with Ortho as needed.    6.  Essential hypertension  Resolved.    7. Overweight (BMI 25.0-29.9)  Resolved.    8. Risk for falls  - Patient identified as fall risk.  Appropriate orders and counseling given.    9. Encounter for screening mammogram for breast cancer  Due for screening.  - MA-SCREENING MAMMO BILAT W/TOMOSYNTHESIS W/CAD; Future    10. Need for vaccination  Prevnar 20 given in clinic.  Tdap and Shingrix sent to pharmacy for administration.  - tetanus-dipth-acell pertussis (ADACEL) 5-2-15.5 LF-MCG/0.5 Suspension; Inject 0.5 mL into the shoulder, thigh, or buttocks one time for 1 dose.  Dispense: 0.5 mL; Refill: 0  - Zoster Vac Recomb Adjuvanted (SHINGRIX) 50 MCG/0.5ML Recon Susp; Inject 0.5 mL into the shoulder, thigh, or buttocks one time for 1 dose.  Dispense: 0.5 mL; Refill: 0  - Pneumococcal Conjugate Vaccine 20-Valent (19 yrs+)     I have placed the below orders and discussed them with an approved delegating provider.  The MA is performing the below orders under the direction of Dr. Alarcon.     Services suggested: No services needed at this time  Health Care Screening: Age-appropriate preventive services recommended by USPTF and ACIP covered by Medicare were discussed today. Services ordered if indicated and agreed upon by the patient.  Referrals offered: Community-based lifestyle interventions to reduce health risks and promote self-management and wellness, fall prevention, nutrition, physical activity, tobacco-use cessation, weight loss, and mental health services as per orders if indicated.    Discussion today about general wellness and lifestyle habits:    Prevent falls and reduce trip hazards; Cautioned about securing or removing rugs.  Have a working fire alarm and carbon monoxide detector;   Engage in regular physical activity and social activities     Follow-up: Return in about 6 months (around 3/8/2023) for Diabetes, A1C in Clinic.    Please note that this dictation was created using voice recognition software. I have  worked with consultants from the vendor as well as technical experts from [facility] to optimize the interface. I have made every reasonable attempt to correct obvious errors, but I expect that there are errors of grammar and possibly content that I did not discover before finalizing the note.

## 2022-09-08 NOTE — ASSESSMENT & PLAN NOTE
Chronic, stable.  Continues rosuvastatin 10 mg daily, denies side effects of the medication. Due for updated labs in September 2023.

## 2022-09-08 NOTE — ASSESSMENT & PLAN NOTE
Currently stable.  Was seen by orthopedics and received a steroid injection.  Has had significant relief of pain and range of motion.

## 2022-09-08 NOTE — ASSESSMENT & PLAN NOTE
Chronic, stable.  Continues Ozempic 0.5 mg weekly, denies side effects of the medication.  Occasionally checks her blood sugars and reports they are usually around 120, has had some in the low 100s.  She continues intermittent fasting and working on weight loss.   Bactrim Counseling:  I discussed with the patient the risks of sulfa antibiotics including but not limited to GI upset, allergic reaction, drug rash, diarrhea, dizziness, photosensitivity, and yeast infections.  Rarely, more serious reactions can occur including but not limited to aplastic anemia, agranulocytosis, methemoglobinemia, blood dyscrasias, liver or kidney failure, lung infiltrates or desquamative/blistering drug rashes.

## 2022-09-26 LAB — RETINAL SCREEN: NEGATIVE

## 2022-09-28 ENCOUNTER — HOSPITAL ENCOUNTER (OUTPATIENT)
Dept: RADIOLOGY | Facility: MEDICAL CENTER | Age: 57
End: 2022-09-28
Attending: NURSE PRACTITIONER
Payer: MEDICARE

## 2022-09-28 DIAGNOSIS — Z12.31 ENCOUNTER FOR SCREENING MAMMOGRAM FOR BREAST CANCER: ICD-10-CM

## 2022-09-28 PROCEDURE — 77063 BREAST TOMOSYNTHESIS BI: CPT

## 2022-10-20 DIAGNOSIS — E03.9 ACQUIRED HYPOTHYROIDISM: ICD-10-CM

## 2022-10-25 RX ORDER — LEVOTHYROXINE SODIUM 0.1 MG/1
TABLET ORAL
Qty: 90 TABLET | Refills: 0 | Status: SHIPPED | OUTPATIENT
Start: 2022-10-25 | End: 2023-03-06

## 2022-10-25 NOTE — TELEPHONE ENCOUNTER
Requested Prescriptions     Signed Prescriptions Disp Refills    levothyroxine (SYNTHROID) 100 MCG Tab 90 Tablet 0     Sig: TAKE 1 TABLET BY MOUTH IN  THE MORNING ON AN EMPTY  STOMACH     Authorizing Provider: BOBBY KENYON A.P.R.N.

## 2022-11-03 ENCOUNTER — PATIENT MESSAGE (OUTPATIENT)
Dept: HEALTH INFORMATION MANAGEMENT | Facility: OTHER | Age: 57
End: 2022-11-03

## 2022-12-21 ENCOUNTER — OFFICE VISIT (OUTPATIENT)
Dept: MEDICAL GROUP | Facility: PHYSICIAN GROUP | Age: 57
End: 2022-12-21
Payer: MEDICARE

## 2022-12-21 VITALS
WEIGHT: 167 LBS | DIASTOLIC BLOOD PRESSURE: 62 MMHG | RESPIRATION RATE: 16 BRPM | OXYGEN SATURATION: 98 % | HEART RATE: 82 BPM | SYSTOLIC BLOOD PRESSURE: 102 MMHG | HEIGHT: 68 IN | TEMPERATURE: 98 F | BODY MASS INDEX: 25.31 KG/M2

## 2022-12-21 DIAGNOSIS — S09.90XA TRAUMATIC INJURY OF HEAD, INITIAL ENCOUNTER: ICD-10-CM

## 2022-12-21 DIAGNOSIS — Z91.81 RISK FOR FALLS: ICD-10-CM

## 2022-12-21 DIAGNOSIS — L82.1 SEBORRHEIC KERATOSIS: ICD-10-CM

## 2022-12-21 DIAGNOSIS — G43.809 OTHER MIGRAINE WITHOUT STATUS MIGRAINOSUS, NOT INTRACTABLE: ICD-10-CM

## 2022-12-21 PROCEDURE — 99214 OFFICE O/P EST MOD 30 MIN: CPT | Performed by: NURSE PRACTITIONER

## 2022-12-21 ASSESSMENT — FIBROSIS 4 INDEX: FIB4 SCORE: 1.28

## 2022-12-21 NOTE — ASSESSMENT & PLAN NOTE
Walking along on a level surface, lost balance, stopped on the edge of the and fell forward  Put her hands out and arms went in between 2 rocks and hit her head on the rock  No LOC    10th    Headaches - not like ones that she has had before  Temples and wraps to front of face like a sinus headache, pressure from both side  Better than they were initially, super bad for a few days  Sporadically throughout the day, 2 - 3 times a day  Takes tylenol immediately - lasting about an hour  Left forehead and bianca black eyes    Different than her normal headaches  No concussion symptoms

## 2022-12-21 NOTE — ASSESSMENT & PLAN NOTE
New to examiner. Patient reports that she was walking on a level surface and lost her balance, she stopped on the edge of the walkway and fell forward. She put her hands out in front of her and each arm went in between two rocks and she hit her head on the rock. Denies LOS. She has bruising to her face that is improving. She has chronic headaches and migraines, but recent headaches are different than her normal headaches. Reports the pain is at bilateral temples and wraps around the front of her face like a sinus headache and puts pressure on both sides. The headaches are improving from the severity right after the injury. States that they occur sporadically throughout the day and occur 2-3 times per day. She takes tylenol immediately and the headache improves after about one hour.

## 2022-12-22 RX ORDER — SUMATRIPTAN 100 MG/1
100 TABLET, FILM COATED ORAL
Qty: 10 TABLET | Refills: 11 | Status: SHIPPED | OUTPATIENT
Start: 2022-12-22

## 2022-12-26 NOTE — ASSESSMENT & PLAN NOTE
Chronic, ongoing. Continues sumatriptan 100 mg one time per day as needed for migraines. Recent head injury has increased migraine and headache frequency, requesting refill.

## 2022-12-26 NOTE — PROGRESS NOTES
CC: Diagnoses of Traumatic injury of head, initial encounter, Risk for falls, Other migraine without status migrainosus, not intractable, and Seborrheic keratosis were pertinent to this visit.                                                                                                                                       HPI:   Frances presents today with the following concerns:    Head injury due to trauma  New to examiner. Patient reports that she was walking on a level surface and lost her balance, she stopped on the edge of the walkway and fell forward. She put her hands out in front of her and each arm went in between two rocks and she hit her head on the rock. Denies LOS. She has bruising to her face that is improving. She has chronic headaches and migraines, but recent headaches are different than her normal headaches. Reports the pain is at bilateral temples and wraps around the front of her face like a sinus headache and puts pressure on both sides. The headaches are improving from the severity right after the injury. States that they occur sporadically throughout the day and occur 2-3 times per day. She takes tylenol immediately and the headache improves after about one hour.     Seborrheic keratosis  New to examiner, ongoing for patient. Requesting referral to dermatology for treatment.    Migraine  Chronic, ongoing. Continues sumatriptan 100 mg one time per day as needed for migraines. Recent head injury has increased migraine and headache frequency, requesting refill.     Patient Active Problem List    Diagnosis Date Noted    Migraine     Head injury due to trauma 12/21/2022    Seborrheic keratosis 12/21/2022    Risk for falls 09/08/2022    Adhesive capsulitis of right shoulder 03/08/2022    Right leg weakness 04/16/2021    Restless leg syndrome 05/27/2020    Onychomycosis 05/27/2020    Right leg numbness 12/26/2019    Lipoma of torso 07/29/2019    Right hand pain 05/13/2019    Type 2 diabetes  mellitus with diabetic polyneuropathy, without long-term current use of insulin (HCC) 01/02/2019    Peripheral polyneuropathy 01/02/2019    History of smoking at least 1 pack per day for at least 30 years 01/02/2019    Chronic toe pain, bilateral 06/21/2018    Acquired hypothyroidism 03/29/2018    Gastroesophageal reflux disease without esophagitis 03/29/2018    DORENE (generalized anxiety disorder) 12/06/2017    Ganglion cyst 10/04/2017    Elevated LFTs 05/23/2017    Chronic bilateral low back pain without sciatica 05/23/2017    Mixed hyperlipidemia 05/09/2017    Bipolar II disorder (HCC) 12/21/2016    Attention deficit hyperactivity disorder (ADHD) 12/21/2016     Current Outpatient Medications   Medication Sig Dispense Refill    sumatriptan (IMITREX) 100 MG tablet Take 1 Tablet by mouth one time as needed for Migraine. 10 Tablet 11    levothyroxine (SYNTHROID) 100 MCG Tab TAKE 1 TABLET BY MOUTH IN  THE MORNING ON AN EMPTY  STOMACH 90 Tablet 0    gabapentin (NEURONTIN) 100 MG Cap TAKE 2 CAPSULES BY MOUTH AT BEDTIME 180 Capsule 1    celecoxib (CELEBREX) 100 MG Cap TAKE 1 CAPSULE BY MOUTH TWICE A  Capsule 3    rosuvastatin (CRESTOR) 10 MG Tab TAKE 1 TABLET BY MOUTH EVERY DAY IN THE EVENING 90 Tablet 3    omeprazole (PRILOSEC) 40 MG delayed-release capsule Take 1 Capsule by mouth every day. 90 Capsule 3    buPROPion (WELLBUTRIN XL) 300 MG XL tablet       sertraline (ZOLOFT) 100 MG Tab       Semaglutide,0.25 or 0.5MG/DOS, (OZEMPIC, 0.25 OR 0.5 MG/DOSE,) 2 MG/1.5ML Solution Pen-injector Inject 0.5 mg under the skin every 7 days. 6 mL 3    buPROPion (WELLBUTRIN XL) 150 MG XL tablet Take 150 mg by mouth every morning.      glucose blood (CONTOUR NEXT TEST) strip 1 Strip by Other route every day. Use to test blood sugar 1 time daily 100 Strip 3    lamotrigine (LAMICTAL) 200 MG tablet Take 200 mg by mouth every day.      traZODone (DESYREL) 100 MG Tab TAKE 1/2 TO 1 TABLET BY  MOUTH AT BEDTIME AS NEEDED  FOR SLEEP 90 Tab  "0     No current facility-administered medications for this visit.     Allergies as of 12/21/2022 - Reviewed 12/21/2022   Allergen Reaction Noted    Lisinopril Cough 07/29/2019      ROS:  See HPI    /62 (BP Location: Left arm, Patient Position: Sitting, BP Cuff Size: Adult)   Pulse 82   Temp 36.7 °C (98 °F) (Temporal)   Resp 16   Ht 1.727 m (5' 8\")   Wt 75.8 kg (167 lb)   LMP 01/01/2004 (Within Months)   SpO2 98%   BMI 25.39 kg/m²     Physical Exam:  General: Normal appearing. No distress.  HEENT: Normocephalic. Eyes conjunctiva clear lids without ptosis, pupils equal and reactive to light accommodation, ears normal shape and contour, canals are clear bilaterally, tympanic membranes are benign, nasal mucosa benign, oropharynx is without erythema, edema or exudates. Sinuses (frontal and maxillary) tender to palpation.  Pulmonary: Clear to ausculation.  Normal effort. No rales, rhonchi, or wheezing.  Cardiovascular: Regular rate and rhythm without murmur. Carotid and radial pulses are intact and equal bilaterally.  Neurologic: Grossly nonfocal.  Lymph: No cervical, supraclavicular or axillary lymph nodes are palpable.  Skin: Warm and dry.  Healing bruising under bilateral eyes and left forehead. Seborrheic keratosis with typical dull surface and \"stuck on\" appearance, well demarcated, slightly elevated plaque with a dull appearance   Musculoskeletal: Normal gait. No extremity cyanosis, clubbing, or edema.  Psych: Normal mood and affect. Alert and oriented x3. Judgment and insight is normal.     Assessment and Plan.   57 y.o. female with the following issues:  1. Traumatic injury of head, initial encounter  2. Risk for falls  New to examiner. Plan for patient to complete head CT without contrast. Discussed concern for bleed related to fall and head injury. Patient is not experiencing any concussion signs. Neuro exam is reassuring. Advised patient to be seen in the ER with any change in symptoms or " signs/symptoms of stroke, patient verbalizes understanding.  - CT-HEAD W/O; Future    3. Other migraine without status migrainosus, not intractable  Chronic, ongoing. Continue sumatriptan 100 mg one time daily as needed for migraine, refill sent to pharmacy.  - sumatriptan (IMITREX) 100 MG tablet; Take 1 Tablet by mouth one time as needed for Migraine.  Dispense: 10 Tablet; Refill: 11    4. Seborrheic keratosis  New to examiner, referral to dermatology for evaluation and treatment.  - Referral to Dermatology     Return if symptoms worsen or fail to improve.     Please note that this dictation was created using voice recognition software. I have worked with consultants from the vendor as well as technical experts from [employer] to optimize the interface. I have made every reasonable attempt to correct obvious errors, but I expect that there are errors of grammar and possibly content that I did not discover before finalizing the note.

## 2022-12-28 ENCOUNTER — HOSPITAL ENCOUNTER (OUTPATIENT)
Dept: RADIOLOGY | Facility: MEDICAL CENTER | Age: 57
End: 2022-12-28
Attending: NURSE PRACTITIONER
Payer: MEDICARE

## 2022-12-28 DIAGNOSIS — S09.90XA TRAUMATIC INJURY OF HEAD, INITIAL ENCOUNTER: ICD-10-CM

## 2022-12-28 PROCEDURE — 70450 CT HEAD/BRAIN W/O DYE: CPT

## 2023-01-20 ENCOUNTER — APPOINTMENT (RX ONLY)
Dept: URBAN - METROPOLITAN AREA CLINIC 22 | Facility: CLINIC | Age: 58
Setting detail: DERMATOLOGY
End: 2023-01-20

## 2023-01-20 DIAGNOSIS — L81.4 OTHER MELANIN HYPERPIGMENTATION: ICD-10-CM

## 2023-01-20 DIAGNOSIS — Z71.89 OTHER SPECIFIED COUNSELING: ICD-10-CM

## 2023-01-20 DIAGNOSIS — L82.0 INFLAMED SEBORRHEIC KERATOSIS: ICD-10-CM

## 2023-01-20 DIAGNOSIS — D18.0 HEMANGIOMA: ICD-10-CM

## 2023-01-20 DIAGNOSIS — L82.1 OTHER SEBORRHEIC KERATOSIS: ICD-10-CM

## 2023-01-20 DIAGNOSIS — D22 MELANOCYTIC NEVI: ICD-10-CM

## 2023-01-20 PROBLEM — D22.5 MELANOCYTIC NEVI OF TRUNK: Status: ACTIVE | Noted: 2023-01-20

## 2023-01-20 PROBLEM — D18.01 HEMANGIOMA OF SKIN AND SUBCUTANEOUS TISSUE: Status: ACTIVE | Noted: 2023-01-20

## 2023-01-20 PROCEDURE — ? SUNSCREEN RECOMMENDATIONS

## 2023-01-20 PROCEDURE — ? LIQUID NITROGEN

## 2023-01-20 PROCEDURE — ? BENIGN DESTRUCTION

## 2023-01-20 PROCEDURE — 17111 DESTRUCTION B9 LESIONS 15/>: CPT

## 2023-01-20 PROCEDURE — ? COUNSELING

## 2023-01-20 PROCEDURE — 99203 OFFICE O/P NEW LOW 30 MIN: CPT | Mod: 25

## 2023-01-20 ASSESSMENT — LOCATION SIMPLE DESCRIPTION DERM
LOCATION SIMPLE: RIGHT LOWER BACK
LOCATION SIMPLE: LEFT LOWER BACK
LOCATION SIMPLE: LEFT UPPER BACK
LOCATION SIMPLE: ABDOMEN
LOCATION SIMPLE: RIGHT UPPER BACK
LOCATION SIMPLE: LEFT FOREARM

## 2023-01-20 ASSESSMENT — LOCATION DETAILED DESCRIPTION DERM
LOCATION DETAILED: LEFT LATERAL ABDOMEN
LOCATION DETAILED: RIGHT LATERAL ABDOMEN
LOCATION DETAILED: LEFT INFERIOR LATERAL UPPER BACK
LOCATION DETAILED: LEFT MID-UPPER BACK
LOCATION DETAILED: RIGHT INFERIOR MEDIAL MIDBACK
LOCATION DETAILED: RIGHT MID-UPPER BACK
LOCATION DETAILED: LEFT SUPERIOR LATERAL UPPER BACK
LOCATION DETAILED: LEFT INFERIOR UPPER BACK
LOCATION DETAILED: RIGHT SUPERIOR UPPER BACK
LOCATION DETAILED: LEFT PROXIMAL DORSAL FOREARM
LOCATION DETAILED: LEFT SUPERIOR MEDIAL UPPER BACK
LOCATION DETAILED: LEFT LATERAL UPPER BACK
LOCATION DETAILED: LEFT SUPERIOR MEDIAL MIDBACK

## 2023-01-20 ASSESSMENT — LOCATION ZONE DERM
LOCATION ZONE: TRUNK
LOCATION ZONE: ARM

## 2023-01-20 NOTE — PROCEDURE: MIPS QUALITY
Quality 111:Pneumonia Vaccination Status For Older Adults: Pneumococcal vaccine (PPSV23) administered on or after patient’s 60th birthday and before the end of the measurement period
Detail Level: Detailed
Quality 402: Tobacco Use And Help With Quitting Among Adolescents: Patient screened for tobacco and is an ex-smoker
Quality 431: Preventive Care And Screening: Unhealthy Alcohol Use - Screening: Patient not identified as an unhealthy alcohol user when screened for unhealthy alcohol use using a systematic screening method

## 2023-01-20 NOTE — PROCEDURE: BENIGN DESTRUCTION
Post-Care Instructions: I reviewed with the patient in detail post-care instructions. Patient is to wear sunprotection, and avoid picking at any of the treated lesions. Pt may apply Vaseline to crusted or scabbing areas.
Include Z78.9 (Other Specified Conditions Influencing Health Status) As An Associated Diagnosis?: No
Anesthesia Volume In Cc: 1
Medical Necessity Clause: This procedure was medically necessary because the lesions that were treated were:
Consent: The patient's consent was obtained including but not limited to risks of crusting, scabbing, blistering, scarring, darker or lighter pigmentary change, recurrence, incomplete removal and infection.
Medical Necessity Information: It is in your best interest to select a reason for this procedure from the list below. All of these items fulfill various CMS LCD requirements except the new and changing color options.
Treatment Number (Will Not Render If 0): 0
Detail Level: Detailed

## 2023-01-20 NOTE — PROCEDURE: LIQUID NITROGEN
Show Topical Anesthesia Variable?: Yes
Render Post-Care Instructions In Note?: no
Medical Necessity Clause: This procedure was medically necessary because the lesions that were treated were:
Detail Level: Detailed
Post-Care Instructions: I reviewed with the patient in detail post-care instructions. Patient is to wear sunprotection, and avoid picking at any of the treated lesions. Pt may apply Vaseline to crusted or scabbing areas.
Spray Paint Text: The liquid nitrogen was applied to the skin utilizing a spray paint frosting technique.
Medical Necessity Information: It is in your best interest to select a reason for this procedure from the list below. All of these items fulfill various CMS LCD requirements except the new and changing color options.
Consent: The patient's mom’s consent was obtained including but not limited to risks of crusting, scabbing, blistering, scarring, darker or lighter pigmentary change, recurrence, incomplete removal and infection.

## 2023-01-25 DIAGNOSIS — E78.2 MIXED HYPERLIPIDEMIA: ICD-10-CM

## 2023-01-25 RX ORDER — ROSUVASTATIN CALCIUM 10 MG/1
10 TABLET, COATED ORAL EVERY EVENING
Qty: 90 TABLET | Refills: 3 | Status: SHIPPED | OUTPATIENT
Start: 2023-01-25 | End: 2024-03-17

## 2023-01-25 NOTE — TELEPHONE ENCOUNTER
Received request via: Patient    Was the patient seen in the last year in this department? Yes 12/2022    Does the patient have an active prescription (recently filled or refills available) for medication(s) requested? No    Does the patient have [facility] and need 100 day supply (blood pressure, diabetes and cholesterol meds only)? Patient does not have SCP

## 2023-01-26 NOTE — TELEPHONE ENCOUNTER
Requested Prescriptions     Pending Prescriptions Disp Refills   • rosuvastatin (CRESTOR) 10 MG Tab 90 Tablet 3     Sig: Take 1 Tablet by mouth every evening.       DANIEL Gallegos.

## 2023-01-27 DIAGNOSIS — E11.42 TYPE 2 DIABETES MELLITUS WITH DIABETIC POLYNEUROPATHY, WITHOUT LONG-TERM CURRENT USE OF INSULIN (HCC): ICD-10-CM

## 2023-01-29 RX ORDER — SEMAGLUTIDE 1.34 MG/ML
INJECTION, SOLUTION SUBCUTANEOUS
Qty: 4.5 ML | Refills: 5 | Status: SHIPPED | OUTPATIENT
Start: 2023-01-29 | End: 2023-04-03

## 2023-01-29 NOTE — TELEPHONE ENCOUNTER
Requested Prescriptions     Pending Prescriptions Disp Refills   • OZEMPIC, 0.25 OR 0.5 MG/DOSE, 2 MG/1.5ML Solution Pen-injector [Pharmacy Med Name: OZEMPIC 0.25-0.5 MG/DOSE PEN] 4.5 mL 5     Sig: INJECT 0.5MG UNDER THE SKIN EVERY 7 DAYS       Sis Ham A.P.R.N.

## 2023-01-31 DIAGNOSIS — K21.9 GASTROESOPHAGEAL REFLUX DISEASE WITHOUT ESOPHAGITIS: ICD-10-CM

## 2023-02-01 RX ORDER — OMEPRAZOLE 40 MG/1
CAPSULE, DELAYED RELEASE ORAL
Qty: 90 CAPSULE | Refills: 3 | Status: SHIPPED | OUTPATIENT
Start: 2023-02-01 | End: 2023-10-23 | Stop reason: SDUPTHER

## 2023-02-01 NOTE — TELEPHONE ENCOUNTER
Requested Prescriptions     Pending Prescriptions Disp Refills   • omeprazole (PRILOSEC) 40 MG delayed-release capsule [Pharmacy Med Name: Omeprazole 40 MG Oral Capsule Delayed Release] 90 Capsule 3     Sig: TAKE 1 CAPSULE BY MOUTH  DAILY       DANIEL Gallegos.

## 2023-03-05 DIAGNOSIS — E03.9 ACQUIRED HYPOTHYROIDISM: ICD-10-CM

## 2023-03-06 RX ORDER — LEVOTHYROXINE SODIUM 0.1 MG/1
TABLET ORAL
Qty: 90 TABLET | Refills: 3 | Status: SHIPPED | OUTPATIENT
Start: 2023-03-06 | End: 2024-01-11

## 2023-03-06 NOTE — TELEPHONE ENCOUNTER
Requested Prescriptions     Pending Prescriptions Disp Refills   • levothyroxine (SYNTHROID) 100 MCG Tab [Pharmacy Med Name: Levothyroxine Sodium 100 MCG Oral Tablet] 90 Tablet 3     Sig: TAKE 1 TABLET BY MOUTH IN THE  MORNING ON AN EMPTY STOMACH       DANIEL Gallegos.

## 2023-04-03 DIAGNOSIS — E11.42 TYPE 2 DIABETES MELLITUS WITH DIABETIC POLYNEUROPATHY, WITHOUT LONG-TERM CURRENT USE OF INSULIN (HCC): ICD-10-CM

## 2023-04-03 NOTE — PROGRESS NOTES
Requested Prescriptions     Signed Prescriptions Disp Refills    Semaglutide,0.25 or 0.5MG/DOS, 2 MG/3ML Solution Pen-injector 9 mL 3     Sig: Inject 0.5 mg under the skin every 7 days.       DANIEL Gallegos.

## 2023-04-10 ENCOUNTER — OFFICE VISIT (OUTPATIENT)
Dept: MEDICAL GROUP | Facility: PHYSICIAN GROUP | Age: 58
End: 2023-04-10
Payer: MEDICARE

## 2023-04-10 VITALS
BODY MASS INDEX: 25.46 KG/M2 | DIASTOLIC BLOOD PRESSURE: 72 MMHG | OXYGEN SATURATION: 96 % | TEMPERATURE: 97.6 F | WEIGHT: 168 LBS | SYSTOLIC BLOOD PRESSURE: 110 MMHG | RESPIRATION RATE: 14 BRPM | HEIGHT: 68 IN | HEART RATE: 84 BPM

## 2023-04-10 DIAGNOSIS — Z00.00 ROUTINE HEALTH MAINTENANCE: ICD-10-CM

## 2023-04-10 DIAGNOSIS — K21.9 GASTROESOPHAGEAL REFLUX DISEASE WITHOUT ESOPHAGITIS: ICD-10-CM

## 2023-04-10 DIAGNOSIS — E78.2 MIXED HYPERLIPIDEMIA: ICD-10-CM

## 2023-04-10 DIAGNOSIS — Z23 NEED FOR VACCINATION: ICD-10-CM

## 2023-04-10 DIAGNOSIS — E11.42 TYPE 2 DIABETES MELLITUS WITH DIABETIC POLYNEUROPATHY, WITHOUT LONG-TERM CURRENT USE OF INSULIN (HCC): ICD-10-CM

## 2023-04-10 DIAGNOSIS — E03.9 ACQUIRED HYPOTHYROIDISM: ICD-10-CM

## 2023-04-10 PROCEDURE — 90715 TDAP VACCINE 7 YRS/> IM: CPT | Performed by: NURSE PRACTITIONER

## 2023-04-10 PROCEDURE — 99214 OFFICE O/P EST MOD 30 MIN: CPT | Mod: 25 | Performed by: NURSE PRACTITIONER

## 2023-04-10 PROCEDURE — 90471 IMMUNIZATION ADMIN: CPT | Performed by: NURSE PRACTITIONER

## 2023-04-10 RX ORDER — FLUOXETINE 10 MG/1
CAPSULE ORAL
COMMUNITY
Start: 2023-04-05

## 2023-04-10 ASSESSMENT — FIBROSIS 4 INDEX: FIB4 SCORE: 1.28

## 2023-04-10 ASSESSMENT — PATIENT HEALTH QUESTIONNAIRE - PHQ9: CLINICAL INTERPRETATION OF PHQ2 SCORE: 0

## 2023-04-10 NOTE — ASSESSMENT & PLAN NOTE
Chronic, ongoing.  Continues omeprazole 40 mg daily, denies side effects of the medication.  Due for annual labs in September 2023.

## 2023-04-10 NOTE — ASSESSMENT & PLAN NOTE
Chronic, ongoing.  Continues rosuvastatin 10 mg nightly, denies side effects of medication.  Due for annual labs in September 2023.

## 2023-04-10 NOTE — ASSESSMENT & PLAN NOTE
Chronic, ongoing.  Last hemoglobin A1c 5.3% in September 2022.  The patient is prescribed Ozempic 0.5 mg weekly and has been doing well on this dose.  Patient states that she has been having difficulty getting the medication due to availability, so she will miss a week's dose while waiting for her refill etc.  She continues to monitor her blood sugars and when she was taking her medication consistently she did have blood sugars as low as 104, recently up to 120.  She is staying active by playing [employer] ball.  She has had some weight gain since she was last seen, states she got off of the intermittent fasting training.  Due for POCT A1c.

## 2023-04-25 DIAGNOSIS — G62.9 PERIPHERAL POLYNEUROPATHY: ICD-10-CM

## 2023-04-25 DIAGNOSIS — E11.42 TYPE 2 DIABETES MELLITUS WITH DIABETIC POLYNEUROPATHY, WITHOUT LONG-TERM CURRENT USE OF INSULIN (HCC): ICD-10-CM

## 2023-04-27 RX ORDER — GABAPENTIN 100 MG/1
200 CAPSULE ORAL
Qty: 180 CAPSULE | Refills: 3 | Status: SHIPPED | OUTPATIENT
Start: 2023-04-27 | End: 2023-09-01 | Stop reason: SDUPTHER

## 2023-04-27 NOTE — TELEPHONE ENCOUNTER
Requested Prescriptions     Pending Prescriptions Disp Refills   • gabapentin (NEURONTIN) 100 MG Cap [Pharmacy Med Name: GABAPENTIN 100 MG CAPSULE] 180 Capsule 3     Sig: TAKE 2 CAPSULES BY MOUTH AT BEDTIME       DANIEL Gallegos.

## 2023-08-26 DIAGNOSIS — M54.50 CHRONIC BILATERAL LOW BACK PAIN WITHOUT SCIATICA: ICD-10-CM

## 2023-08-26 DIAGNOSIS — G89.29 CHRONIC BILATERAL LOW BACK PAIN WITHOUT SCIATICA: ICD-10-CM

## 2023-08-29 RX ORDER — CELECOXIB 100 MG/1
CAPSULE ORAL
Qty: 180 CAPSULE | Refills: 0 | Status: SHIPPED | OUTPATIENT
Start: 2023-08-29 | End: 2023-09-28

## 2023-09-01 DIAGNOSIS — E11.42 TYPE 2 DIABETES MELLITUS WITH DIABETIC POLYNEUROPATHY, WITHOUT LONG-TERM CURRENT USE OF INSULIN (HCC): ICD-10-CM

## 2023-09-01 DIAGNOSIS — G62.9 PERIPHERAL POLYNEUROPATHY: ICD-10-CM

## 2023-09-03 RX ORDER — GABAPENTIN 100 MG/1
200 CAPSULE ORAL
Qty: 180 CAPSULE | Refills: 3 | Status: SHIPPED | OUTPATIENT
Start: 2023-09-03

## 2023-09-04 NOTE — TELEPHONE ENCOUNTER
Requested Prescriptions     Pending Prescriptions Disp Refills   • gabapentin (NEURONTIN) 100 MG Cap 180 Capsule 3     Sig: Take 2 Capsules by mouth at bedtime.       DANIEL Gallegos.

## 2023-09-28 ENCOUNTER — HOSPITAL ENCOUNTER (OUTPATIENT)
Dept: LAB | Facility: MEDICAL CENTER | Age: 58
End: 2023-09-28
Attending: NURSE PRACTITIONER
Payer: MEDICARE

## 2023-09-28 ENCOUNTER — OFFICE VISIT (OUTPATIENT)
Dept: MEDICAL GROUP | Facility: PHYSICIAN GROUP | Age: 58
End: 2023-09-28
Payer: MEDICARE

## 2023-09-28 VITALS
RESPIRATION RATE: 14 BRPM | SYSTOLIC BLOOD PRESSURE: 118 MMHG | HEIGHT: 68 IN | OXYGEN SATURATION: 96 % | WEIGHT: 165 LBS | BODY MASS INDEX: 25.01 KG/M2 | TEMPERATURE: 97.8 F | HEART RATE: 102 BPM | DIASTOLIC BLOOD PRESSURE: 78 MMHG

## 2023-09-28 DIAGNOSIS — E78.2 MIXED HYPERLIPIDEMIA: ICD-10-CM

## 2023-09-28 DIAGNOSIS — E03.9 ACQUIRED HYPOTHYROIDISM: ICD-10-CM

## 2023-09-28 DIAGNOSIS — E11.42 TYPE 2 DIABETES MELLITUS WITH DIABETIC POLYNEUROPATHY, WITHOUT LONG-TERM CURRENT USE OF INSULIN (HCC): ICD-10-CM

## 2023-09-28 DIAGNOSIS — Z12.31 ENCOUNTER FOR SCREENING MAMMOGRAM FOR BREAST CANCER: ICD-10-CM

## 2023-09-28 DIAGNOSIS — M25.552 PAIN OF LEFT HIP: ICD-10-CM

## 2023-09-28 DIAGNOSIS — Z00.00 ROUTINE HEALTH MAINTENANCE: ICD-10-CM

## 2023-09-28 DIAGNOSIS — K21.9 GASTROESOPHAGEAL REFLUX DISEASE WITHOUT ESOPHAGITIS: ICD-10-CM

## 2023-09-28 LAB
ALBUMIN SERPL BCP-MCNC: 4.9 G/DL (ref 3.2–4.9)
ALBUMIN/GLOB SERPL: 2.7 G/DL
ALP SERPL-CCNC: 84 U/L (ref 30–99)
ALT SERPL-CCNC: 86 U/L (ref 2–50)
ANION GAP SERPL CALC-SCNC: 8 MMOL/L (ref 7–16)
AST SERPL-CCNC: 44 U/L (ref 12–45)
BASOPHILS # BLD AUTO: 0.5 % (ref 0–1.8)
BASOPHILS # BLD: 0.05 K/UL (ref 0–0.12)
BILIRUB SERPL-MCNC: 0.5 MG/DL (ref 0.1–1.5)
BUN SERPL-MCNC: 21 MG/DL (ref 8–22)
CALCIUM ALBUM COR SERPL-MCNC: 8.9 MG/DL (ref 8.5–10.5)
CALCIUM SERPL-MCNC: 9.6 MG/DL (ref 8.5–10.5)
CHLORIDE SERPL-SCNC: 102 MMOL/L (ref 96–112)
CHOLEST SERPL-MCNC: 110 MG/DL (ref 100–199)
CO2 SERPL-SCNC: 29 MMOL/L (ref 20–33)
CREAT SERPL-MCNC: 0.9 MG/DL (ref 0.5–1.4)
EOSINOPHIL # BLD AUTO: 0.16 K/UL (ref 0–0.51)
EOSINOPHIL NFR BLD: 1.7 % (ref 0–6.9)
ERYTHROCYTE [DISTWIDTH] IN BLOOD BY AUTOMATED COUNT: 40.4 FL (ref 35.9–50)
EST. AVERAGE GLUCOSE BLD GHB EST-MCNC: 111 MG/DL
FASTING STATUS PATIENT QL REPORTED: NORMAL
GFR SERPLBLD CREATININE-BSD FMLA CKD-EPI: 73 ML/MIN/1.73 M 2
GLOBULIN SER CALC-MCNC: 1.8 G/DL (ref 1.9–3.5)
GLUCOSE SERPL-MCNC: 147 MG/DL (ref 65–99)
HBA1C MFR BLD: 5.5 % (ref 4–5.6)
HCT VFR BLD AUTO: 48.3 % (ref 37–47)
HDLC SERPL-MCNC: 46 MG/DL
HGB BLD-MCNC: 16 G/DL (ref 12–16)
IMM GRANULOCYTES # BLD AUTO: 0.03 K/UL (ref 0–0.11)
IMM GRANULOCYTES NFR BLD AUTO: 0.3 % (ref 0–0.9)
LDLC SERPL CALC-MCNC: 47 MG/DL
LYMPHOCYTES # BLD AUTO: 1.28 K/UL (ref 1–4.8)
LYMPHOCYTES NFR BLD: 13.6 % (ref 22–41)
MCH RBC QN AUTO: 30.4 PG (ref 27–33)
MCHC RBC AUTO-ENTMCNC: 33.1 G/DL (ref 32.2–35.5)
MCV RBC AUTO: 91.7 FL (ref 81.4–97.8)
MONOCYTES # BLD AUTO: 0.59 K/UL (ref 0–0.85)
MONOCYTES NFR BLD AUTO: 6.3 % (ref 0–13.4)
NEUTROPHILS # BLD AUTO: 7.31 K/UL (ref 1.82–7.42)
NEUTROPHILS NFR BLD: 77.6 % (ref 44–72)
NRBC # BLD AUTO: 0 K/UL
NRBC BLD-RTO: 0 /100 WBC (ref 0–0.2)
PLATELET # BLD AUTO: 196 K/UL (ref 164–446)
PMV BLD AUTO: 10.8 FL (ref 9–12.9)
POTASSIUM SERPL-SCNC: 4.7 MMOL/L (ref 3.6–5.5)
PROT SERPL-MCNC: 6.7 G/DL (ref 6–8.2)
RBC # BLD AUTO: 5.27 M/UL (ref 4.2–5.4)
SODIUM SERPL-SCNC: 139 MMOL/L (ref 135–145)
TRIGL SERPL-MCNC: 85 MG/DL (ref 0–149)
TSH SERPL DL<=0.005 MIU/L-ACNC: 1.04 UIU/ML (ref 0.38–5.33)
WBC # BLD AUTO: 9.4 K/UL (ref 4.8–10.8)

## 2023-09-28 PROCEDURE — 3074F SYST BP LT 130 MM HG: CPT | Performed by: NURSE PRACTITIONER

## 2023-09-28 PROCEDURE — 84443 ASSAY THYROID STIM HORMONE: CPT

## 2023-09-28 PROCEDURE — 80061 LIPID PANEL: CPT

## 2023-09-28 PROCEDURE — 99214 OFFICE O/P EST MOD 30 MIN: CPT | Performed by: NURSE PRACTITIONER

## 2023-09-28 PROCEDURE — 36415 COLL VENOUS BLD VENIPUNCTURE: CPT

## 2023-09-28 PROCEDURE — 83036 HEMOGLOBIN GLYCOSYLATED A1C: CPT | Mod: GA

## 2023-09-28 PROCEDURE — 3078F DIAST BP <80 MM HG: CPT | Performed by: NURSE PRACTITIONER

## 2023-09-28 PROCEDURE — 85025 COMPLETE CBC W/AUTO DIFF WBC: CPT

## 2023-09-28 PROCEDURE — 80053 COMPREHEN METABOLIC PANEL: CPT

## 2023-09-28 ASSESSMENT — FIBROSIS 4 INDEX: FIB4 SCORE: 1.3

## 2023-09-28 ASSESSMENT — PAIN SCALES - GENERAL: PAINLEVEL: 10=SEVERE PAIN

## 2023-10-02 NOTE — ASSESSMENT & PLAN NOTE
"New to examiner, ongoing for patient. Reports that the pain started a few months ago, but she was able to continue playing pickle ball. The pain has gradually gotten worse and is a deep ache. She was having pain with walking and playing pickle ball, as well as with sleeping or standing for any amount of time. She was seen at the [facility] and told she likely has a labral tear and prescribed her \"strong anti-inflammatories\", and advised she not do anything for three weeks. She thomas tart PT on 10/5/2023. She says that the pain is not getting better, radiates down the front of her leg and causes severe pain. Exercise helps her depression, which is out of control. Requesting imaging.  "

## 2023-10-02 NOTE — PROGRESS NOTES
"CC: Diagnoses of Type 2 diabetes mellitus with diabetic polyneuropathy, without long-term current use of insulin (HCC), Pain of left hip, and Encounter for screening mammogram for breast cancer were pertinent to this visit.                                                                                                                                       HPI:   Frances presents today with the following concerns:    Pain of left hip  New to examiner, ongoing for patient. Reports that the pain started a few months ago, but she was able to continue playing pickle ball. The pain has gradually gotten worse and is a deep ache. She was having pain with walking and playing pickle ball, as well as with sleeping or standing for any amount of time. She was seen at the [facility] and told she likely has a labral tear and prescribed her \"strong anti-inflammatories\", and advised she not do anything for three weeks. She thomas tart PT on 10/5/2023. She says that the pain is not getting better, radiates down the front of her leg and causes severe pain. Exercise helps her depression, which is out of control. Requesting imaging.    Patient Active Problem List    Diagnosis Date Noted    Pain of left hip 09/28/2023    Migraine     Head injury due to trauma 12/21/2022    Seborrheic keratosis 12/21/2022    Risk for falls 09/08/2022    Adhesive capsulitis of right shoulder 03/08/2022    Right leg weakness 04/16/2021    Restless leg syndrome 05/27/2020    Onychomycosis 05/27/2020    Right leg numbness 12/26/2019    Lipoma of torso 07/29/2019    Right hand pain 05/13/2019    Type 2 diabetes mellitus with diabetic polyneuropathy, without long-term current use of insulin (HCC) 01/02/2019    Peripheral polyneuropathy 01/02/2019    History of smoking at least 1 pack per day for at least 30 years 01/02/2019    Chronic toe pain, bilateral 06/21/2018    Acquired hypothyroidism 03/29/2018    Gastroesophageal reflux disease without esophagitis 03/29/2018    " "DORENE (generalized anxiety disorder) 12/06/2017    Ganglion cyst 10/04/2017    Elevated LFTs 05/23/2017    Chronic bilateral low back pain without sciatica 05/23/2017    Mixed hyperlipidemia 05/09/2017    Bipolar II disorder (HCC) 12/21/2016    Attention deficit hyperactivity disorder (ADHD) 12/21/2016     Current Outpatient Medications   Medication Sig Dispense Refill    gabapentin (NEURONTIN) 100 MG Cap Take 2 Capsules by mouth at bedtime. 180 Capsule 3    FLUoxetine (PROZAC) 10 MG Cap       Semaglutide,0.25 or 0.5MG/DOS, 2 MG/3ML Solution Pen-injector Inject 0.5 mg under the skin every 7 days. 9 mL 3    levothyroxine (SYNTHROID) 100 MCG Tab TAKE 1 TABLET BY MOUTH IN THE  MORNING ON AN EMPTY STOMACH 90 Tablet 3    omeprazole (PRILOSEC) 40 MG delayed-release capsule TAKE 1 CAPSULE BY MOUTH  DAILY 90 Capsule 3    rosuvastatin (CRESTOR) 10 MG Tab Take 1 Tablet by mouth every evening. 90 Tablet 3    sumatriptan (IMITREX) 100 MG tablet Take 1 Tablet by mouth one time as needed for Migraine. 10 Tablet 11    buPROPion (WELLBUTRIN XL) 300 MG XL tablet       buPROPion (WELLBUTRIN XL) 150 MG XL tablet Take 150 mg by mouth every morning.      glucose blood (CONTOUR NEXT TEST) strip 1 Strip by Other route every day. Use to test blood sugar 1 time daily 100 Strip 3    lamotrigine (LAMICTAL) 200 MG tablet Take 200 mg by mouth every day.      traZODone (DESYREL) 100 MG Tab TAKE 1/2 TO 1 TABLET BY  MOUTH AT BEDTIME AS NEEDED  FOR SLEEP 90 Tab 0     No current facility-administered medications for this visit.     Allergies as of 09/28/2023 - Reviewed 09/28/2023   Allergen Reaction Noted    Lisinopril Cough 07/29/2019      ROS:  See HPI    /78 (BP Location: Left arm, Patient Position: Sitting, BP Cuff Size: Large adult)   Pulse (!) 102   Temp 36.6 °C (97.8 °F) (Temporal)   Resp 14   Ht 1.727 m (5' 8\")   Wt 74.8 kg (165 lb)   LMP 01/01/2004 (Within Months)   SpO2 96%   BMI 25.09 kg/m²     Physical Exam:  General: Well " nourished, well developed female in NAD, awake and conversant.  Eyes: Normal conjunctiva, anicteric.  Round symmetrical pupils.  ENT: Hearing grossly intact.  No nasal discharge.  Neck: Neck is supple.  No masses or thyromegaly.  CV: No lower extremity edema.  Respiratory: Respirations are nonlabored.  No wheezing.  Abdomen: Non-Distended.  Skin: Warm.  No rashes or ulcers.  MSK: Normal ambulation.  No clubbing or cyanosis.  Neuro: Sensation and CN II-XII grossly normal.  Psych: Alert and oriented.  Cooperative, appropriate mood and affect, normal judgment.      Assessment and Plan.   58 y.o. female with the following issues:  1. Pain of left hip  New to examiner, referral to ortho. Start PT on 10/5/2023 as ordered. Plan for patient to complete left hip MRI, will notify her of results through [employer] when received.   - Referral to Orthopedics  - MR-HIP-W/O; Future    2. Encounter for screening mammogram for breast cancer  Due for screening.  - MA-SCREENING MAMMO BILAT W/CAD; Future      Return in about 11 days (around 10/9/2023) for AWV.     Please note that this dictation was created using voice recognition software. I have worked with consultants from the vendor as well as technical experts from [employer] to optimize the interface. I have made every reasonable attempt to correct obvious errors, but I expect that there are errors of grammar and possibly content that I did not discover before finalizing the note.

## 2023-10-06 SDOH — ECONOMIC STABILITY: HOUSING INSECURITY
IN THE LAST 12 MONTHS, WAS THERE A TIME WHEN YOU DID NOT HAVE A STEADY PLACE TO SLEEP OR SLEPT IN A SHELTER (INCLUDING NOW)?: NO

## 2023-10-06 SDOH — ECONOMIC STABILITY: FOOD INSECURITY: WITHIN THE PAST 12 MONTHS, YOU WORRIED THAT YOUR FOOD WOULD RUN OUT BEFORE YOU GOT MONEY TO BUY MORE.: NEVER TRUE

## 2023-10-06 SDOH — HEALTH STABILITY: PHYSICAL HEALTH: ON AVERAGE, HOW MANY DAYS PER WEEK DO YOU ENGAGE IN MODERATE TO STRENUOUS EXERCISE (LIKE A BRISK WALK)?: 0 DAYS

## 2023-10-06 SDOH — ECONOMIC STABILITY: HOUSING INSECURITY: IN THE LAST 12 MONTHS, HOW MANY PLACES HAVE YOU LIVED?: 1

## 2023-10-06 SDOH — ECONOMIC STABILITY: TRANSPORTATION INSECURITY
IN THE PAST 12 MONTHS, HAS THE LACK OF TRANSPORTATION KEPT YOU FROM MEDICAL APPOINTMENTS OR FROM GETTING MEDICATIONS?: NO

## 2023-10-06 SDOH — ECONOMIC STABILITY: TRANSPORTATION INSECURITY
IN THE PAST 12 MONTHS, HAS LACK OF TRANSPORTATION KEPT YOU FROM MEETINGS, WORK, OR FROM GETTING THINGS NEEDED FOR DAILY LIVING?: NO

## 2023-10-06 SDOH — ECONOMIC STABILITY: INCOME INSECURITY: IN THE LAST 12 MONTHS, WAS THERE A TIME WHEN YOU WERE NOT ABLE TO PAY THE MORTGAGE OR RENT ON TIME?: NO

## 2023-10-06 SDOH — HEALTH STABILITY: MENTAL HEALTH
STRESS IS WHEN SOMEONE FEELS TENSE, NERVOUS, ANXIOUS, OR CAN'T SLEEP AT NIGHT BECAUSE THEIR MIND IS TROUBLED. HOW STRESSED ARE YOU?: RATHER MUCH

## 2023-10-06 SDOH — HEALTH STABILITY: PHYSICAL HEALTH: ON AVERAGE, HOW MANY MINUTES DO YOU ENGAGE IN EXERCISE AT THIS LEVEL?: 0 MIN

## 2023-10-06 SDOH — ECONOMIC STABILITY: FOOD INSECURITY: WITHIN THE PAST 12 MONTHS, THE FOOD YOU BOUGHT JUST DIDN'T LAST AND YOU DIDN'T HAVE MONEY TO GET MORE.: NEVER TRUE

## 2023-10-06 SDOH — ECONOMIC STABILITY: INCOME INSECURITY: HOW HARD IS IT FOR YOU TO PAY FOR THE VERY BASICS LIKE FOOD, HOUSING, MEDICAL CARE, AND HEATING?: NOT VERY HARD

## 2023-10-06 SDOH — ECONOMIC STABILITY: TRANSPORTATION INSECURITY
IN THE PAST 12 MONTHS, HAS LACK OF RELIABLE TRANSPORTATION KEPT YOU FROM MEDICAL APPOINTMENTS, MEETINGS, WORK OR FROM GETTING THINGS NEEDED FOR DAILY LIVING?: NO

## 2023-10-06 ASSESSMENT — SOCIAL DETERMINANTS OF HEALTH (SDOH)
HOW OFTEN DO YOU ATTENT MEETINGS OF THE CLUB OR ORGANIZATION YOU BELONG TO?: MORE THAN 4 TIMES PER YEAR
HOW OFTEN DO YOU GET TOGETHER WITH FRIENDS OR RELATIVES?: ONCE A WEEK
HOW OFTEN DO YOU ATTEND CHURCH OR RELIGIOUS SERVICES?: 1 TO 4 TIMES PER YEAR
IN A TYPICAL WEEK, HOW MANY TIMES DO YOU TALK ON THE PHONE WITH FAMILY, FRIENDS, OR NEIGHBORS?: TWICE A WEEK
HOW HARD IS IT FOR YOU TO PAY FOR THE VERY BASICS LIKE FOOD, HOUSING, MEDICAL CARE, AND HEATING?: NOT VERY HARD
HOW OFTEN DO YOU HAVE A DRINK CONTAINING ALCOHOL: 2-4 TIMES A MONTH
HOW MANY DRINKS CONTAINING ALCOHOL DO YOU HAVE ON A TYPICAL DAY WHEN YOU ARE DRINKING: 1 OR 2
DO YOU BELONG TO ANY CLUBS OR ORGANIZATIONS SUCH AS CHURCH GROUPS UNIONS, FRATERNAL OR ATHLETIC GROUPS, OR SCHOOL GROUPS?: YES
HOW OFTEN DO YOU GET TOGETHER WITH FRIENDS OR RELATIVES?: ONCE A WEEK
WITHIN THE PAST 12 MONTHS, YOU WORRIED THAT YOUR FOOD WOULD RUN OUT BEFORE YOU GOT THE MONEY TO BUY MORE: NEVER TRUE
DO YOU BELONG TO ANY CLUBS OR ORGANIZATIONS SUCH AS CHURCH GROUPS UNIONS, FRATERNAL OR ATHLETIC GROUPS, OR SCHOOL GROUPS?: YES
HOW OFTEN DO YOU HAVE SIX OR MORE DRINKS ON ONE OCCASION: NEVER
HOW OFTEN DO YOU ATTENT MEETINGS OF THE CLUB OR ORGANIZATION YOU BELONG TO?: MORE THAN 4 TIMES PER YEAR
IN A TYPICAL WEEK, HOW MANY TIMES DO YOU TALK ON THE PHONE WITH FAMILY, FRIENDS, OR NEIGHBORS?: TWICE A WEEK
HOW OFTEN DO YOU ATTEND CHURCH OR RELIGIOUS SERVICES?: 1 TO 4 TIMES PER YEAR

## 2023-10-06 ASSESSMENT — LIFESTYLE VARIABLES
HOW MANY STANDARD DRINKS CONTAINING ALCOHOL DO YOU HAVE ON A TYPICAL DAY: 1 OR 2
HOW OFTEN DO YOU HAVE SIX OR MORE DRINKS ON ONE OCCASION: NEVER
SKIP TO QUESTIONS 9-10: 1
AUDIT-C TOTAL SCORE: 2
HOW OFTEN DO YOU HAVE A DRINK CONTAINING ALCOHOL: 2-4 TIMES A MONTH

## 2023-10-09 ENCOUNTER — OFFICE VISIT (OUTPATIENT)
Dept: MEDICAL GROUP | Facility: PHYSICIAN GROUP | Age: 58
End: 2023-10-09
Payer: MEDICARE

## 2023-10-09 ENCOUNTER — HOSPITAL ENCOUNTER (OUTPATIENT)
Facility: MEDICAL CENTER | Age: 58
End: 2023-10-09
Attending: NURSE PRACTITIONER
Payer: MEDICARE

## 2023-10-09 VITALS
DIASTOLIC BLOOD PRESSURE: 62 MMHG | WEIGHT: 167 LBS | RESPIRATION RATE: 15 BRPM | OXYGEN SATURATION: 95 % | HEIGHT: 68 IN | SYSTOLIC BLOOD PRESSURE: 104 MMHG | BODY MASS INDEX: 25.31 KG/M2 | HEART RATE: 92 BPM | TEMPERATURE: 98.5 F

## 2023-10-09 DIAGNOSIS — Z00.00 MEDICARE ANNUAL WELLNESS VISIT, SUBSEQUENT: ICD-10-CM

## 2023-10-09 DIAGNOSIS — E78.2 MIXED HYPERLIPIDEMIA: ICD-10-CM

## 2023-10-09 DIAGNOSIS — Z23 NEED FOR VACCINATION: ICD-10-CM

## 2023-10-09 DIAGNOSIS — G62.9 PERIPHERAL POLYNEUROPATHY: ICD-10-CM

## 2023-10-09 DIAGNOSIS — E11.42 TYPE 2 DIABETES MELLITUS WITH DIABETIC POLYNEUROPATHY, WITHOUT LONG-TERM CURRENT USE OF INSULIN (HCC): ICD-10-CM

## 2023-10-09 DIAGNOSIS — K21.9 GASTROESOPHAGEAL REFLUX DISEASE WITHOUT ESOPHAGITIS: ICD-10-CM

## 2023-10-09 DIAGNOSIS — M25.552 PAIN OF LEFT HIP: ICD-10-CM

## 2023-10-09 DIAGNOSIS — E03.9 ACQUIRED HYPOTHYROIDISM: ICD-10-CM

## 2023-10-09 PROBLEM — S09.90XA HEAD INJURY DUE TO TRAUMA: Status: RESOLVED | Noted: 2022-12-21 | Resolved: 2023-10-09

## 2023-10-09 LAB
CREAT UR-MCNC: 164.97 MG/DL
MICROALBUMIN UR-MCNC: <1.2 MG/DL
MICROALBUMIN/CREAT UR: NORMAL MG/G (ref 0–30)

## 2023-10-09 PROCEDURE — G0439 PPPS, SUBSEQ VISIT: HCPCS | Mod: 25 | Performed by: NURSE PRACTITIONER

## 2023-10-09 PROCEDURE — 90686 IIV4 VACC NO PRSV 0.5 ML IM: CPT | Performed by: NURSE PRACTITIONER

## 2023-10-09 PROCEDURE — 82570 ASSAY OF URINE CREATININE: CPT

## 2023-10-09 PROCEDURE — G0008 ADMIN INFLUENZA VIRUS VAC: HCPCS | Performed by: NURSE PRACTITIONER

## 2023-10-09 PROCEDURE — 3074F SYST BP LT 130 MM HG: CPT | Performed by: NURSE PRACTITIONER

## 2023-10-09 PROCEDURE — 3078F DIAST BP <80 MM HG: CPT | Performed by: NURSE PRACTITIONER

## 2023-10-09 PROCEDURE — 82043 UR ALBUMIN QUANTITATIVE: CPT

## 2023-10-09 ASSESSMENT — ACTIVITIES OF DAILY LIVING (ADL): BATHING_REQUIRES_ASSISTANCE: 1

## 2023-10-09 ASSESSMENT — FIBROSIS 4 INDEX: FIB4 SCORE: 1.45

## 2023-10-09 ASSESSMENT — ENCOUNTER SYMPTOMS: GENERAL WELL-BEING: FAIR

## 2023-10-09 ASSESSMENT — PATIENT HEALTH QUESTIONNAIRE - PHQ9: CLINICAL INTERPRETATION OF PHQ2 SCORE: 0

## 2023-10-09 NOTE — ASSESSMENT & PLAN NOTE
Chronic, ongoing.  Most recent hemoglobin A1c 5.5%.  Continues Ozempic 0.5 mg weekly, does continue to have difficulty getting the medication due to availability.  She is unable to stay active playing pickle ball due to left hip pain.

## 2023-10-09 NOTE — ASSESSMENT & PLAN NOTE
Chronic, ongoing.  Continues rosuvastatin 10 mg nightly, denies side effects of medication.  Due for annual labs in September 2024.

## 2023-10-09 NOTE — ASSESSMENT & PLAN NOTE
Chronic, ongoing.  Patient waiting to schedule MRI of left hip.  Continues to follow with orthopedics.

## 2023-10-09 NOTE — PROGRESS NOTES
Chief Complaint   Patient presents with    Annual Wellness Visit     HPI:  Frances Delgado is a 58 y.o. here for Medicare Annual Wellness Visit     Acquired hypothyroidism  Chronic, ongoing.  Continues levothyroxine 100 mcg daily.  Due for annual labs in September 2024.    Gastroesophageal reflux disease without esophagitis  Chronic, ongoing.  Continues omeprazole 40 mg daily, denies side effects of medication.  Due for annual labs in September 2024.    Mixed hyperlipidemia  Chronic, ongoing.  Continues rosuvastatin 10 mg nightly, denies side effects of medication.  Due for annual labs in September 2024.    Pain of left hip  Chronic, ongoing.  Patient waiting to schedule MRI of left hip.  Continues to follow with orthopedics.    Peripheral polyneuropathy (HCC)  Chronic, ongoing.  Continues gabapentin 200 mg nightly, continues to feel that the medication made a significant improvement in the pain and itching from neuropathy.    Type 2 diabetes mellitus with diabetic polyneuropathy, without long-term current use of insulin (HCC)  Chronic, ongoing.  Most recent hemoglobin A1c 5.5%.  Continues Ozempic 0.5 mg weekly, does continue to have difficulty getting the medication due to availability.  She is unable to stay active playing pickle ball due to left hip pain.     Patient Active Problem List    Diagnosis Date Noted    Pain of left hip 09/28/2023    Migraine     Seborrheic keratosis 12/21/2022    Risk for falls 09/08/2022    Adhesive capsulitis of right shoulder 03/08/2022    Right leg weakness 04/16/2021    Restless leg syndrome 05/27/2020    Onychomycosis 05/27/2020    Right leg numbness 12/26/2019    Lipoma of torso 07/29/2019    Right hand pain 05/13/2019    Type 2 diabetes mellitus with diabetic polyneuropathy, without long-term current use of insulin (HCC) 01/02/2019    Peripheral polyneuropathy 01/02/2019    History of smoking at least 1 pack per day for at least 30 years 01/02/2019    Chronic toe pain,  bilateral 06/21/2018    Acquired hypothyroidism 03/29/2018    Gastroesophageal reflux disease without esophagitis 03/29/2018    DORENE (generalized anxiety disorder) 12/06/2017    Ganglion cyst 10/04/2017    Elevated LFTs 05/23/2017    Chronic bilateral low back pain without sciatica 05/23/2017    Mixed hyperlipidemia 05/09/2017    Bipolar II disorder (HCC) 12/21/2016    Attention deficit hyperactivity disorder (ADHD) 12/21/2016     Current Outpatient Medications   Medication Sig Dispense Refill    gabapentin (NEURONTIN) 100 MG Cap Take 2 Capsules by mouth at bedtime. 180 Capsule 3    FLUoxetine (PROZAC) 10 MG Cap       Semaglutide,0.25 or 0.5MG/DOS, 2 MG/3ML Solution Pen-injector Inject 0.5 mg under the skin every 7 days. 9 mL 3    levothyroxine (SYNTHROID) 100 MCG Tab TAKE 1 TABLET BY MOUTH IN THE  MORNING ON AN EMPTY STOMACH 90 Tablet 3    omeprazole (PRILOSEC) 40 MG delayed-release capsule TAKE 1 CAPSULE BY MOUTH  DAILY 90 Capsule 3    rosuvastatin (CRESTOR) 10 MG Tab Take 1 Tablet by mouth every evening. 90 Tablet 3    sumatriptan (IMITREX) 100 MG tablet Take 1 Tablet by mouth one time as needed for Migraine. 10 Tablet 11    buPROPion (WELLBUTRIN XL) 300 MG XL tablet       buPROPion (WELLBUTRIN XL) 150 MG XL tablet Take 150 mg by mouth every morning.      glucose blood (CONTOUR NEXT TEST) strip 1 Strip by Other route every day. Use to test blood sugar 1 time daily 100 Strip 3    lamotrigine (LAMICTAL) 200 MG tablet Take 200 mg by mouth every day.      traZODone (DESYREL) 100 MG Tab TAKE 1/2 TO 1 TABLET BY  MOUTH AT BEDTIME AS NEEDED  FOR SLEEP 90 Tab 0     No current facility-administered medications for this visit.          Current supplements as per medication list.     Allergies: Lisinopril    Current social contact/activities: Talks on the phone, goes out to dinner     She  reports that she quit smoking about 7 years ago. Her smoking use included cigarettes. She started smoking about 45 years ago. She has a  38.6 pack-year smoking history. She has never used smokeless tobacco. She reports current alcohol use. She reports that she does not use drugs.  Counseling given: Yes    ROS:    Gait: Uses no assistive device  Ostomy: No  Other tubes: No  Amputations: No  Chronic oxygen use: No  Last eye exam: 2023  Wears hearing aids: No   : Denies any urinary leakage during the last 6 months    Screening:    Depression Screening  Little interest or pleasure in doing things?  0 - not at all  Feeling down, depressed , or hopeless? 0 - not at all  Patient Health Questionnaire Score: 0     If depressive symptoms identified deferred to follow up visit unless specifically addressed in assessment and plan.    Interpretation of PHQ-9 Total Score   Score Severity   1-4 No Depression   5-9 Mild Depression   10-14 Moderate Depression   15-19 Moderately Severe Depression   20-27 Severe Depression    Screening for Cognitive Impairment  Do you or any of your friends or family members have any concern about your memory? No  Three Minute Recall (Banana, Sunrise, Chair) 2/3    Fidencio clock face with all 12 numbers and set the hands to show 20 past 8.  Yes    Cognitive concerns identified deferred for follow up unless specifically addressed in assessment and plan.    Fall Risk Assessment  Has the patient had two or more falls in the last year or any fall with injury in the last year?  No    Safety Assessment  Do you always wear your seatbelt?  No  Any changes to home needed to function safely? No  Difficulty hearing.  No  Patient counseled about all safety risks that were identified.    Functional Assessment ADLs  Are there any barriers preventing you from cooking for yourself or meeting nutritional needs?  Yes.    Are there any barriers preventing you from driving safely or obtaining transportation?  Yes.    Are there any barriers preventing you from using a telephone or calling for help?  No    Are there any barriers preventing you from shopping?   Yes.    Are there any barriers preventing you from taking care of your own finances?  No    Are there any barriers preventing you from managing your medications?  No    Are there any barriers preventing you from showering, bathing or dressing yourself? Yes    Are there any barriers preventing you from doing housework or laundry? Yes  Are there any barriers preventing you from using the toilet?Yes  Are you currently engaging in any exercise or physical activity?  No.      Self-Assessment of Health  What is your perception of your health? Fair  Do you sleep more than six hours a night? Yes  In the past 7 days, how much did pain keep you from doing your normal work? A lot  Do you spend quality time with family or friends (virtually or in person)? Yes  Do you usually eat a heart healthy diet that constists of a variety of fruits, vegetables, whole grains and fiber? No  Do you eat foods high in fat and/or Fast Food more than three times per week? No    Advance Care Planning  Do you have an Advance Directive, Living Will, Durable Power of , or POLST? Yes    Living Will     is not on file - instructed patient to bring in a copy to scan into their chart    Health Maintenance Summary            Ordered - Diabetes: Urine Protein Screening (Yearly) Ordered on 10/9/2023      09/06/2022  MICROALBUMIN CREAT RATIO URINE    03/12/2021  MICROALBUMIN CREAT RATIO URINE    05/21/2020  MICROALBUMIN CREAT RATIO URINE (LAB COLLECT)    05/13/2019  MICROALBUMIN CREAT RATIO URINE (CLINIC COLLECT)    03/26/2018  MICROALBUMIN CREAT RATIO URINE    Only the first 5 history entries have been loaded, but more history exists.              Ordered - Mammogram (Yearly) Ordered on 9/28/2023 09/28/2022  MA-SCREENING MAMMO BILAT W/IMPLANTS W/FERDINAND W/CAD    06/01/2021  MA-SCREENING MAMMO BILAT W/IMPLANTS W/FERDINAND W/CAD    02/04/2020  MA-MAMMO SCREEN BILAT IMPLANTS FERDINAND CAD    04/16/2018  MA-MAMMO SCREEN BILAT IMPLANTS FERDINAND CAD    11/08/2016   MA-DIAGNOSTIC DIGITAL MAMMO-UNILAT RIGHT    Only the first 5 history entries have been loaded, but more history exists.              A1c Screening (Every 6 Months) Next due on 3/28/2024      09/28/2023  HEMOGLOBIN A1C    09/06/2022  HEMOGLOBIN A1C    01/18/2022  POCT  A1C    09/21/2021  HEMOGLOBIN A1C    03/12/2021  HEMOGLOBIN A1C    Only the first 5 history entries have been loaded, but more history exists.              Diabetes: Retinopathy Screening (Yearly) Next due on 5/8/2024 05/08/2023  RETINAL SCREENING RESULTS    09/08/2022  Done - results in media    09/08/2020  POCT Retinal Eye Exam    05/13/2019  POCT Retinal Eye Exam    11/08/2017  REFERRAL FOR RETINAL SCREENING EXAM              Fasting Lipid Profile (Yearly) Next due on 9/28/2024 09/28/2023  Lipid Profile    09/06/2022  Lipid Profile    09/21/2021  Lipid Profile    03/12/2021  Lipid Profile    05/21/2020  Lipid Profile    Only the first 5 history entries have been loaded, but more history exists.              SERUM CREATININE (Yearly) Next due on 9/28/2024 09/28/2023  Comp Metabolic Panel    09/06/2022  Comp Metabolic Panel    03/12/2021  Comp Metabolic Panel    06/08/2020  Comp Metabolic Panel    05/07/2019  COMP METABOLIC PANEL    Only the first 5 history entries have been loaded, but more history exists.              Annual Wellness Visit (Every 366 Days) Next due on 10/9/2024      10/09/2023  Visit Dx: Medicare annual wellness visit, subsequent    10/09/2023  Subsequent Annual Wellness Visit - Includes PPPS ()    09/08/2022  Visit Dx: Medicare annual wellness visit, initial    09/08/2022  Initial Annual Wellness Visit - Includes PPPS ()              Diabetes: Monofilament / LE Exam (Yearly) Next due on 10/9/2024      10/09/2023  Diabetic Monofilament LE Exam    09/08/2022  Diabetic Monofilament LE Exam    09/08/2022  SmartData: WORKFLOW - DIABETES - DIABETIC FOOT EXAM PERFORMED    09/27/2021  Diabetic Monofilament Lower  Extremity Exam    09/27/2021  SmartData: WORKFLOW - DIABETES - DIABETIC FOOT EXAM PERFORMED    Only the first 5 history entries have been loaded, but more history exists.              Colorectal Cancer Screening (Colonoscopy - Preferred) Next due on 7/15/2026      07/15/2016  REFERRAL TO GI FOR COLONOSCOPY              IMM DTaP/Tdap/Td Vaccine (2 - Td or Tdap) Next due on 4/10/2033      04/10/2023  Imm Admin: Tdap Vaccine              Hepatitis C Screening  Completed      03/29/2018  Hepatitis C Antibody component of HEPATITIS PANEL ACUTE(4 COMPONENTS)              Lung Cancer Screening Shared Decision Making  Completed      01/14/2020  Level of Service: MA COUNSELING LUNG CA SCREEN LDCT              Pneumococcal Vaccine: 0-64 Years (Series Information) Completed      09/08/2022  Imm Admin: Pneumococcal Conjugate Vaccine (PCV20)    01/02/2019  Imm Admin: Pneumococcal polysaccharide vaccine (PPSV-23)              Zoster (Shingles) Vaccines (Series Information) Completed      10/19/2022  Imm Admin: Zoster Vaccine Recombinant (RZV) (SHINGRIX)    12/20/2021  Imm Admin: Zoster Vaccine Recombinant (RZV) (SHINGRIX)              COVID-19 Vaccine (Series Information) Completed      10/19/2022  Imm Admin: PFIZER BIVALENT SARS-COV-2 VACCINE (12+)    12/20/2021  Imm Admin: PFIZER PURPLE CAP SARS-COV-2 VACCINATION (12+)    04/17/2021  Imm Admin: MODERNA SARS-COV-2 VACCINE (12+)    03/19/2021  Imm Admin: MODERNA SARS-COV-2 VACCINE (12+)              Influenza Vaccine (Series Information) Completed      10/09/2023  Imm Admin: Influenza Vaccine Quad Inj (Pf)    11/01/2022  Imm Admin: Influenza Vaccine Quad Inj (Pf)    09/27/2021  Imm Admin: Influenza Vaccine Quad Inj (Pf)    10/01/2020  Imm Admin: Influenza Vac Subunit Quad Inj (Pf)    12/26/2019  Imm Admin: Influenza Vaccine Quad Inj (Pf)    Only the first 5 history entries have been loaded, but more history exists.              Hepatitis A Vaccine (Hep A) (Series Information)  Aged Out      No completion history exists for this topic.              HPV Vaccines (Series Information) Aged Out      No completion history exists for this topic.              Polio Vaccine (Inactivated Polio) (Series Information) Aged Out      No completion history exists for this topic.              Meningococcal Immunization (Series Information) Aged Out      No completion history exists for this topic.              Discontinued - Cervical Cancer Screening  Discontinued        Frequency changed to Never automatically (Topic No Longer Applies)    2018  THINPREP PAP WITH HPV    2018  PATHOLOGY GYN SPECIMEN              Discontinued - Hepatitis B Vaccine (Hep B)  Discontinued      No completion history exists for this topic.                  Patient Care Team:  CRISTINA Gallegos as PCP - General (Family Medicine)  Mele Frausto M.D. (Internal Medicine)    Social History     Tobacco Use    Smoking status: Former     Current packs/day: 0.00     Average packs/day: 1 pack/day for 38.6 years (38.6 ttl pk-yrs)     Types: Cigarettes     Start date: 1978     Quit date: 2016     Years since quittin.1    Smokeless tobacco: Never   Vaping Use    Vaping Use: Never used   Substance Use Topics    Alcohol use: Yes     Comment: holidays    Drug use: No     Family History   Problem Relation Age of Onset    Bipolar disorder Father     Alcohol abuse Father         ETOH    Other Father         Cirrhosis    Diabetes Mother     Hypertension Mother     Other Mother         gilberts syndrome    Alcohol abuse Sister     Psychiatric Illness Sister     Drug abuse Sister     Alcohol abuse Maternal Grandmother     Other Maternal Grandmother         Brain Tumor    Diabetes Maternal Grandfather     Alzheimer's Disease Maternal Grandfather     Cancer Maternal Grandfather         Skin    Cancer Maternal Aunt         stomach    Diabetes Maternal Uncle     Alcohol abuse Maternal Uncle     Stroke Maternal Uncle      "Diabetes Sister     Hyperlipidemia Sister     Anxiety disorder Sister     Other Sister         benign uterine tumor    Obesity Sister     Psychiatric Illness Sister     Obesity Sister     Psychiatric Illness Sister     Alcohol/Drug Maternal Uncle         prescription narcotics    Cancer Maternal Uncle      She  has a past medical history of ADD (attention deficit disorder), Bipolar 1 disorder (HCC), Bipolar disorder (HCC), Cervical cancer (HCC) (1984), Chronic back pain, Depression, Diabetes (HCC), Essential hypertension (05/09/2017), Head injury due to trauma (12/21/2022), Hyperlipidemia, Insomnia, Memory loss due to medical condition, Migraine, Overweight (BMI 25.0-29.9) (01/18/2022), and Thyroid disease.     Past Surgical History:   Procedure Laterality Date    TN BREAST AUGMENTATION WITH IMPLANT  2004    ABDOMINAL HYSTERECTOMY TOTAL  2004    total    LUMPECTOMY      PRIMARY C SECTION      TOE ARTHROPLASTY Right     cut tendons, fused joints. last surgery removed last joint of all toes except for big toe     Exam:   /62 (BP Location: Left arm, Patient Position: Sitting, BP Cuff Size: Large adult)   Pulse 92   Temp 36.9 °C (98.5 °F) (Temporal)   Resp 15   Ht 1.727 m (5' 8\")   Wt 75.8 kg (167 lb)   SpO2 95%  Body mass index is 25.39 kg/m².    Hearing excellent.    Dentition good  Alert, oriented in no acute distress.  Eye contact is good, speech goal directed, affect calm    Monofilament testing with a 10 gram force: sensation intact: Decreased bilaterally  Visual Inspection: Feet without maceration, ulcers, fissures.  Pedal pulses: intact bilaterally    Assessment and Plan. The following treatment and monitoring plan is recommended:    1. Medicare annual wellness visit, subsequent  - Subsequent Annual Wellness Visit - Includes PPPS ()    2. Type 2 diabetes mellitus with diabetic polyneuropathy, without long-term current use of insulin (HCC)  Chronic, ongoing.  Continue Ozempic 0.5 mg weekly, does " not need refill at this time.  Monofilament completed in clinic, microalbumin urine sample obtained and sent to lab.  Due for updated annual labs in September 2024 prior to annual follow-up.  - Diabetic Monofilament LE Exam  - MICROALBUMIN CREAT RATIO URINE; Future    3. Acquired hypothyroidism  Chronic, ongoing.  Continue levothyroxine 100 mcg daily, does not need refill at this time. Due for updated annual labs in September 2024 prior to annual follow-up.    4. Mixed hyperlipidemia  Chronic, ongoing.  Continue rosuvastatin 10 mg nightly, does not need refill at this time. Due for updated annual labs in September 2024 prior to annual follow-up.    5. Gastroesophageal reflux disease without esophagitis  Chronic, ongoing.  Continue omeprazole 40 mg daily, does not need refill at this time. Due for updated annual labs in September 2024 prior to annual follow-up.    6. Peripheral polyneuropathy  Chronic, ongoing.  Continue gabapentin 200 mg nightly, does not need a refill at this time.    7. Pain of left hip  Chronic, ongoing.  Left hip MRI previously ordered, will notify patient of results through [facility] when received.    8. Need for vaccination  Given today.  - INFLUENZA VACCINE QUAD INJ (PF)     I have placed the below orders and discussed them with an approved delegating provider. The MA is performing the below orders under the direction of Dr. Deluca.      Services suggested: No services needed at this time  Health Care Screening: Age-appropriate preventive services recommended by USPTF and ACIP covered by Medicare were discussed today. Services ordered if indicated and agreed upon by the patient.  Referrals offered: Community-based lifestyle interventions to reduce health risks and promote self-management and wellness, fall prevention, nutrition, physical activity, tobacco-use cessation, weight loss, and mental health services as per orders if indicated.    Discussion today about general wellness and lifestyle  habits:    Prevent falls and reduce trip hazards; Cautioned about securing or removing rugs.  Have a working fire alarm and carbon monoxide detector;   Engage in regular physical activity and social activities     Follow-up: Return in about 1 year (around 10/9/2024) for AWV.    Please note that this dictation was created using voice recognition software. I have worked with consultants from the vendor as well as technical experts from [facility] to optimize the interface. I have made every reasonable attempt to correct obvious errors, but I expect that there are errors of grammar and possibly content that I did not discover before finalizing the note.

## 2023-10-09 NOTE — ASSESSMENT & PLAN NOTE
Chronic, ongoing.  Continues omeprazole 40 mg daily, denies side effects of medication.  Due for annual labs in September 2024.

## 2023-10-11 DIAGNOSIS — K21.9 GASTROESOPHAGEAL REFLUX DISEASE WITHOUT ESOPHAGITIS: ICD-10-CM

## 2023-10-11 DIAGNOSIS — E78.2 MIXED HYPERLIPIDEMIA: ICD-10-CM

## 2023-10-11 DIAGNOSIS — Z11.4 SCREENING FOR HIV WITHOUT PRESENCE OF RISK FACTORS: ICD-10-CM

## 2023-10-11 DIAGNOSIS — R79.89 ELEVATED LFTS: ICD-10-CM

## 2023-10-11 DIAGNOSIS — E11.42 TYPE 2 DIABETES MELLITUS WITH DIABETIC POLYNEUROPATHY, WITHOUT LONG-TERM CURRENT USE OF INSULIN (HCC): ICD-10-CM

## 2023-10-11 DIAGNOSIS — E03.9 ACQUIRED HYPOTHYROIDISM: ICD-10-CM

## 2023-10-11 DIAGNOSIS — Z00.00 ROUTINE HEALTH MAINTENANCE: ICD-10-CM

## 2023-10-12 NOTE — PROGRESS NOTES
1. Acquired hypothyroidism  - TSH WITH REFLEX TO FT4; Future    2. Elevated LFTs  - Comp Metabolic Panel; Future  - Lipid Profile; Future    3. Gastroesophageal reflux disease without esophagitis  - CBC WITH DIFFERENTIAL; Future  - Comp Metabolic Panel; Future  - TSH WITH REFLEX TO FT4; Future    4. Mixed hyperlipidemia  - Comp Metabolic Panel; Future  - Lipid Profile; Future  - TSH WITH REFLEX TO FT4; Future    5. Type 2 diabetes mellitus with diabetic polyneuropathy, without long-term current use of insulin (HCC)  - HEMOGLOBIN A1C; Future  - Comp Metabolic Panel; Future  - Lipid Profile; Future  - MICROALBUMIN CREAT RATIO URINE; Future    6. Routine health maintenance  - HEMOGLOBIN A1C; Future  - CBC WITH DIFFERENTIAL; Future  - Comp Metabolic Panel; Future  - Lipid Profile; Future  - MICROALBUMIN CREAT RATIO URINE; Future  - TSH WITH REFLEX TO FT4; Future  - HIV AG/AB COMBO ASSAY SCREENING; Future    7. Screening for HIV without presence of risk factors  - HIV AG/AB COMBO ASSAY SCREENING; Future       Due for updated annual labs in September/October 2024 prior to annual follow-up.

## 2023-10-23 ENCOUNTER — HOSPITAL ENCOUNTER (OUTPATIENT)
Dept: RADIOLOGY | Facility: MEDICAL CENTER | Age: 58
End: 2023-10-23
Attending: NURSE PRACTITIONER
Payer: MEDICARE

## 2023-10-23 DIAGNOSIS — M25.552 PAIN OF LEFT HIP: ICD-10-CM

## 2023-10-23 DIAGNOSIS — K21.9 GASTROESOPHAGEAL REFLUX DISEASE WITHOUT ESOPHAGITIS: ICD-10-CM

## 2023-10-23 PROCEDURE — 73721 MRI JNT OF LWR EXTRE W/O DYE: CPT

## 2023-10-24 RX ORDER — OMEPRAZOLE 40 MG/1
40 CAPSULE, DELAYED RELEASE ORAL DAILY
Qty: 90 CAPSULE | Refills: 0 | Status: SHIPPED | OUTPATIENT
Start: 2023-10-24 | End: 2024-01-30 | Stop reason: SDUPTHER

## 2023-10-26 ENCOUNTER — APPOINTMENT (OUTPATIENT)
Dept: RADIOLOGY | Facility: MEDICAL CENTER | Age: 58
End: 2023-10-26
Attending: NURSE PRACTITIONER
Payer: MEDICARE

## 2024-01-11 DIAGNOSIS — E03.9 ACQUIRED HYPOTHYROIDISM: ICD-10-CM

## 2024-01-11 RX ORDER — LEVOTHYROXINE SODIUM 0.1 MG/1
100 TABLET ORAL
Qty: 90 TABLET | Refills: 2 | Status: SHIPPED
Start: 2024-01-11

## 2024-01-12 NOTE — TELEPHONE ENCOUNTER
Requested Prescriptions     Pending Prescriptions Disp Refills    levothyroxine (SYNTHROID) 100 MCG Tab [Pharmacy Med Name: LEVOTHYROXINE 100 MCG TABLET] 90 Tablet 2     Sig: TAKE 1 TABLET BY MOUTH EVERY DAY IN THE MORNING ON AN EMPTY STOMACH       DANIEL Gallegos.

## 2024-01-30 ENCOUNTER — APPOINTMENT (OUTPATIENT)
Dept: RADIOLOGY | Facility: MEDICAL CENTER | Age: 59
End: 2024-01-30
Attending: NURSE PRACTITIONER
Payer: MEDICARE

## 2024-01-30 DIAGNOSIS — K21.9 GASTROESOPHAGEAL REFLUX DISEASE WITHOUT ESOPHAGITIS: ICD-10-CM

## 2024-01-31 RX ORDER — OMEPRAZOLE 40 MG/1
40 CAPSULE, DELAYED RELEASE ORAL DAILY
Qty: 90 CAPSULE | Refills: 2 | Status: SHIPPED | OUTPATIENT
Start: 2024-01-31

## 2024-01-31 NOTE — TELEPHONE ENCOUNTER
Requested Prescriptions     Pending Prescriptions Disp Refills    omeprazole (PRILOSEC) 40 MG delayed-release capsule 90 Capsule 2     Sig: Take 1 Capsule by mouth every day.       DANIEL Gallegos.

## 2024-01-31 NOTE — TELEPHONE ENCOUNTER
Received request via: Patient    Was the patient seen in the last year in this department? Yes    Does the patient have an active prescription (recently filled or refills available) for medication(s) requested? No    Ph[facility] Name: [facility]     Does the patient have long term Plus and need 100 day supply (blood pressure, diabetes and cholesterol meds only)? Patient does not have SCP

## 2024-02-14 ENCOUNTER — APPOINTMENT (OUTPATIENT)
Dept: RADIOLOGY | Facility: MEDICAL CENTER | Age: 59
End: 2024-02-14
Attending: NURSE PRACTITIONER
Payer: MEDICARE

## 2024-03-15 DIAGNOSIS — E78.2 MIXED HYPERLIPIDEMIA: ICD-10-CM

## 2024-03-15 NOTE — TELEPHONE ENCOUNTER
Received request via: Patient    Was the patient seen in the last year in this department? Yes    Does the patient have an active prescription (recently filled or refills available) for medication(s) requested? No    Pharmacy Name: [facility] [place]    Does the patient have intermediate Plus and need 100 day supply (blood pressure, diabetes and cholesterol meds only)? Patient does not have SCP

## 2024-03-17 RX ORDER — ROSUVASTATIN CALCIUM 10 MG/1
10 TABLET, COATED ORAL EVERY EVENING
Qty: 90 TABLET | Refills: 3 | Status: SHIPPED | OUTPATIENT
Start: 2024-03-17

## 2024-03-17 NOTE — TELEPHONE ENCOUNTER
Requested Prescriptions     Pending Prescriptions Disp Refills    rosuvastatin (CRESTOR) 10 MG Tab [Pharmacy Med Name: ROSUVASTATIN CALCIUM 10 MG TAB] 90 Tablet 3     Sig: TAKE 1 TABLET BY MOUTH EVERY DAY IN THE EVENING       DANIEL Gallegos.

## 2024-04-08 DIAGNOSIS — E11.42 TYPE 2 DIABETES MELLITUS WITH DIABETIC POLYNEUROPATHY, WITHOUT LONG-TERM CURRENT USE OF INSULIN (HCC): ICD-10-CM

## 2024-04-08 RX ORDER — SEMAGLUTIDE 0.68 MG/ML
0.5 INJECTION, SOLUTION SUBCUTANEOUS
Qty: 9 ML | Refills: 1 | Status: SHIPPED | OUTPATIENT
Start: 2024-04-08

## 2024-04-08 NOTE — TELEPHONE ENCOUNTER
Requested Prescriptions     Pending Prescriptions Disp Refills    OZEMPIC, 0.25 OR 0.5 MG/DOSE, 2 MG/3ML Solution Pen-injector [Pharmacy Med Name: OZEMPIC 0.25-0.5 MG/DOSE PEN] 9 mL 1     Sig: INJECT 0.5MG UNDER THE SKIN EVERY 7 DAYS       DANIEL Gallegos.

## 2024-06-26 ENCOUNTER — HOSPITAL ENCOUNTER (OUTPATIENT)
Dept: RADIOLOGY | Facility: MEDICAL CENTER | Age: 59
End: 2024-06-26
Attending: NURSE PRACTITIONER
Payer: MEDICARE

## 2024-06-26 DIAGNOSIS — Z12.31 ENCOUNTER FOR SCREENING MAMMOGRAM FOR BREAST CANCER: ICD-10-CM

## 2024-06-26 PROCEDURE — 77063 BREAST TOMOSYNTHESIS BI: CPT

## 2024-09-21 DIAGNOSIS — E11.42 TYPE 2 DIABETES MELLITUS WITH DIABETIC POLYNEUROPATHY, WITHOUT LONG-TERM CURRENT USE OF INSULIN (HCC): ICD-10-CM

## 2024-09-24 RX ORDER — SEMAGLUTIDE 0.68 MG/ML
0.5 INJECTION, SOLUTION SUBCUTANEOUS
Qty: 3 ML | Refills: 0 | Status: SHIPPED | OUTPATIENT
Start: 2024-09-24

## 2024-09-25 NOTE — TELEPHONE ENCOUNTER
Requested Prescriptions     Pending Prescriptions Disp Refills    Semaglutide,0.25 or 0.5MG/DOS, (OZEMPIC, 0.25 OR 0.5 MG/DOSE,) 2 MG/3ML Solution Pen-injector [Pharmacy Med Name: OZEMPIC 0.25-0.5 MG/DOSE PEN] 3 mL 0     Sig: INJECT 0.5MG UNDER THE SKIN EVERY 7 DAYS       DANIEL Gallegos.

## 2024-10-17 DIAGNOSIS — E03.9 ACQUIRED HYPOTHYROIDISM: ICD-10-CM

## 2024-10-17 RX ORDER — LEVOTHYROXINE SODIUM 100 UG/1
100 TABLET ORAL
Qty: 90 TABLET | Refills: 0 | Status: SHIPPED | OUTPATIENT
Start: 2024-10-17

## 2024-10-25 DIAGNOSIS — K21.9 GASTROESOPHAGEAL REFLUX DISEASE WITHOUT ESOPHAGITIS: ICD-10-CM

## 2024-10-28 RX ORDER — OMEPRAZOLE 40 MG/1
40 CAPSULE, DELAYED RELEASE ORAL DAILY
Qty: 90 CAPSULE | Refills: 0 | Status: SHIPPED | OUTPATIENT
Start: 2024-10-28

## 2024-11-04 DIAGNOSIS — E11.42 TYPE 2 DIABETES MELLITUS WITH DIABETIC POLYNEUROPATHY, WITHOUT LONG-TERM CURRENT USE OF INSULIN (HCC): ICD-10-CM

## 2024-11-05 NOTE — TELEPHONE ENCOUNTER
Received request via: Patient    Was the patient seen in the last year in this department? No    Does the patient have an active prescription (recently filled or refills available) for medication(s) requested? No    [facility]    Does the patient have skilled nursing Plus and need 100-day supply? (This applies to ALL medications) Patient does not have SCP

## 2024-11-06 RX ORDER — SEMAGLUTIDE 0.68 MG/ML
0.5 INJECTION, SOLUTION SUBCUTANEOUS
Qty: 9 ML | Refills: 0 | Status: SHIPPED | OUTPATIENT
Start: 2024-11-06

## 2024-11-06 NOTE — TELEPHONE ENCOUNTER
Requested Prescriptions     Pending Prescriptions Disp Refills    Semaglutide,0.25 or 0.5MG/DOS, (OZEMPIC, 0.25 OR 0.5 MG/DOSE,) 2 MG/3ML Solution Pen-injector 9 mL 0     Sig: Inject 0.5 mg under the skin every 7 days.       DANIEL Gallegos.

## 2024-11-11 DIAGNOSIS — G62.9 PERIPHERAL POLYNEUROPATHY: ICD-10-CM

## 2024-11-11 DIAGNOSIS — E11.42 TYPE 2 DIABETES MELLITUS WITH DIABETIC POLYNEUROPATHY, WITHOUT LONG-TERM CURRENT USE OF INSULIN (HCC): ICD-10-CM

## 2024-11-11 RX ORDER — GABAPENTIN 100 MG/1
200 CAPSULE ORAL
Qty: 180 CAPSULE | Refills: 0 | Status: SHIPPED | OUTPATIENT
Start: 2024-11-11

## 2024-11-11 NOTE — TELEPHONE ENCOUNTER
Requested Prescriptions     Pending Prescriptions Disp Refills    gabapentin (NEURONTIN) 100 MG Cap 180 Capsule 0     Sig: Take 2 Capsules by mouth at bedtime.       DANIEL Gallegos.

## 2024-12-11 ENCOUNTER — APPOINTMENT (OUTPATIENT)
Dept: LAB | Facility: MEDICAL CENTER | Age: 59
End: 2024-12-11
Attending: NURSE PRACTITIONER
Payer: MEDICARE

## 2024-12-11 DIAGNOSIS — R79.89 ELEVATED LFTS: ICD-10-CM

## 2024-12-11 DIAGNOSIS — Z11.4 SCREENING FOR HIV WITHOUT PRESENCE OF RISK FACTORS: ICD-10-CM

## 2024-12-11 DIAGNOSIS — E11.42 TYPE 2 DIABETES MELLITUS WITH DIABETIC POLYNEUROPATHY, WITHOUT LONG-TERM CURRENT USE OF INSULIN (HCC): ICD-10-CM

## 2024-12-11 DIAGNOSIS — E78.2 MIXED HYPERLIPIDEMIA: ICD-10-CM

## 2024-12-11 DIAGNOSIS — E03.9 ACQUIRED HYPOTHYROIDISM: ICD-10-CM

## 2024-12-11 DIAGNOSIS — K21.9 GASTROESOPHAGEAL REFLUX DISEASE WITHOUT ESOPHAGITIS: ICD-10-CM

## 2024-12-11 NOTE — PROGRESS NOTES
1. Type 2 diabetes mellitus with diabetic polyneuropathy, without long-term current use of insulin (HCC)  - HEMOGLOBIN A1C; Future  - Comp Metabolic Panel; Future  - Lipid Profile; Future  - MICROALBUMIN CREAT RATIO URINE; Future    2. Mixed hyperlipidemia  - Comp Metabolic Panel; Future  - Lipid Profile; Future  - TSH WITH REFLEX TO FT4; Future    3. Elevated LFTs  - Comp Metabolic Panel; Future    4. Acquired hypothyroidism  - TSH WITH REFLEX TO FT4; Future    5. Screening for HIV without presence of risk factors  - HIV AG/AB COMBO ASSAY SCREENING; Future    6. Gastroesophageal reflux disease without esophagitis  - CBC WITH DIFFERENTIAL; Future  - Comp Metabolic Panel; Future  - TSH WITH REFLEX TO FT4; Future

## 2025-01-04 ENCOUNTER — HOSPITAL ENCOUNTER (OUTPATIENT)
Dept: LAB | Facility: MEDICAL CENTER | Age: 60
End: 2025-01-04
Attending: NURSE PRACTITIONER
Payer: MEDICARE

## 2025-01-04 DIAGNOSIS — R79.89 ELEVATED LFTS: ICD-10-CM

## 2025-01-04 DIAGNOSIS — E03.9 ACQUIRED HYPOTHYROIDISM: ICD-10-CM

## 2025-01-04 DIAGNOSIS — E11.42 TYPE 2 DIABETES MELLITUS WITH DIABETIC POLYNEUROPATHY, WITHOUT LONG-TERM CURRENT USE OF INSULIN (HCC): ICD-10-CM

## 2025-01-04 DIAGNOSIS — Z11.4 SCREENING FOR HIV WITHOUT PRESENCE OF RISK FACTORS: ICD-10-CM

## 2025-01-04 DIAGNOSIS — E78.2 MIXED HYPERLIPIDEMIA: ICD-10-CM

## 2025-01-04 DIAGNOSIS — K21.9 GASTROESOPHAGEAL REFLUX DISEASE WITHOUT ESOPHAGITIS: ICD-10-CM

## 2025-01-04 LAB
ALBUMIN SERPL BCP-MCNC: 4.5 G/DL (ref 3.2–4.9)
ALBUMIN/GLOB SERPL: 2 G/DL
ALP SERPL-CCNC: 71 U/L (ref 30–99)
ALT SERPL-CCNC: 56 U/L (ref 2–50)
ANION GAP SERPL CALC-SCNC: 13 MMOL/L (ref 7–16)
AST SERPL-CCNC: 30 U/L (ref 12–45)
BASOPHILS # BLD AUTO: 1.1 % (ref 0–1.8)
BASOPHILS # BLD: 0.05 K/UL (ref 0–0.12)
BILIRUB SERPL-MCNC: 0.6 MG/DL (ref 0.1–1.5)
BUN SERPL-MCNC: 13 MG/DL (ref 8–22)
CALCIUM ALBUM COR SERPL-MCNC: 9.1 MG/DL (ref 8.5–10.5)
CALCIUM SERPL-MCNC: 9.5 MG/DL (ref 8.5–10.5)
CHLORIDE SERPL-SCNC: 100 MMOL/L (ref 96–112)
CHOLEST SERPL-MCNC: 104 MG/DL (ref 100–199)
CO2 SERPL-SCNC: 25 MMOL/L (ref 20–33)
CREAT SERPL-MCNC: 1.06 MG/DL (ref 0.5–1.4)
CREAT UR-MCNC: 268.26 MG/DL
EOSINOPHIL # BLD AUTO: 0.1 K/UL (ref 0–0.51)
EOSINOPHIL NFR BLD: 2.2 % (ref 0–6.9)
ERYTHROCYTE [DISTWIDTH] IN BLOOD BY AUTOMATED COUNT: 42.9 FL (ref 35.9–50)
EST. AVERAGE GLUCOSE BLD GHB EST-MCNC: 97 MG/DL
GFR SERPLBLD CREATININE-BSD FMLA CKD-EPI: 60 ML/MIN/1.73 M 2
GLOBULIN SER CALC-MCNC: 2.2 G/DL (ref 1.9–3.5)
GLUCOSE SERPL-MCNC: 159 MG/DL (ref 65–99)
HBA1C MFR BLD: 5 % (ref 4–5.6)
HCT VFR BLD AUTO: 48.1 % (ref 37–47)
HDLC SERPL-MCNC: 47 MG/DL
HGB BLD-MCNC: 15.9 G/DL (ref 12–16)
HIV 1+2 AB+HIV1 P24 AG SERPL QL IA: NORMAL
IMM GRANULOCYTES # BLD AUTO: 0.01 K/UL (ref 0–0.11)
IMM GRANULOCYTES NFR BLD AUTO: 0.2 % (ref 0–0.9)
LDLC SERPL CALC-MCNC: 40 MG/DL
LYMPHOCYTES # BLD AUTO: 0.85 K/UL (ref 1–4.8)
LYMPHOCYTES NFR BLD: 18.3 % (ref 22–41)
MCH RBC QN AUTO: 31.2 PG (ref 27–33)
MCHC RBC AUTO-ENTMCNC: 33.1 G/DL (ref 32.2–35.5)
MCV RBC AUTO: 94.3 FL (ref 81.4–97.8)
MICROALBUMIN UR-MCNC: 2.7 MG/DL
MICROALBUMIN/CREAT UR: 10 MG/G (ref 0–30)
MONOCYTES # BLD AUTO: 0.25 K/UL (ref 0–0.85)
MONOCYTES NFR BLD AUTO: 5.4 % (ref 0–13.4)
NEUTROPHILS # BLD AUTO: 3.39 K/UL (ref 1.82–7.42)
NEUTROPHILS NFR BLD: 72.8 % (ref 44–72)
NRBC # BLD AUTO: 0 K/UL
NRBC BLD-RTO: 0 /100 WBC (ref 0–0.2)
PLATELET # BLD AUTO: 185 K/UL (ref 164–446)
PMV BLD AUTO: 10.8 FL (ref 9–12.9)
POTASSIUM SERPL-SCNC: 4 MMOL/L (ref 3.6–5.5)
PROT SERPL-MCNC: 6.7 G/DL (ref 6–8.2)
RBC # BLD AUTO: 5.1 M/UL (ref 4.2–5.4)
SODIUM SERPL-SCNC: 138 MMOL/L (ref 135–145)
TRIGL SERPL-MCNC: 86 MG/DL (ref 0–149)
TSH SERPL DL<=0.005 MIU/L-ACNC: 0.77 UIU/ML (ref 0.38–5.33)
WBC # BLD AUTO: 4.7 K/UL (ref 4.8–10.8)

## 2025-01-04 PROCEDURE — 80061 LIPID PANEL: CPT

## 2025-01-04 PROCEDURE — 80053 COMPREHEN METABOLIC PANEL: CPT

## 2025-01-04 PROCEDURE — 84443 ASSAY THYROID STIM HORMONE: CPT

## 2025-01-04 PROCEDURE — 83036 HEMOGLOBIN GLYCOSYLATED A1C: CPT | Mod: GA

## 2025-01-04 PROCEDURE — 82043 UR ALBUMIN QUANTITATIVE: CPT

## 2025-01-04 PROCEDURE — 82570 ASSAY OF URINE CREATININE: CPT

## 2025-01-04 PROCEDURE — G0475 HIV COMBINATION ASSAY: HCPCS | Mod: GA

## 2025-01-04 PROCEDURE — 85025 COMPLETE CBC W/AUTO DIFF WBC: CPT

## 2025-01-04 PROCEDURE — 36415 COLL VENOUS BLD VENIPUNCTURE: CPT | Mod: GA

## 2025-01-08 ENCOUNTER — APPOINTMENT (OUTPATIENT)
Dept: MEDICAL GROUP | Facility: PHYSICIAN GROUP | Age: 60
End: 2025-01-08
Payer: MEDICARE

## 2025-01-08 VITALS
WEIGHT: 163 LBS | BODY MASS INDEX: 24.71 KG/M2 | HEIGHT: 68 IN | SYSTOLIC BLOOD PRESSURE: 110 MMHG | HEART RATE: 91 BPM | TEMPERATURE: 97.4 F | OXYGEN SATURATION: 93 % | DIASTOLIC BLOOD PRESSURE: 60 MMHG

## 2025-01-08 DIAGNOSIS — R79.89 ELEVATED LFTS: ICD-10-CM

## 2025-01-08 DIAGNOSIS — Z23 NEED FOR VACCINATION: ICD-10-CM

## 2025-01-08 DIAGNOSIS — E03.9 ACQUIRED HYPOTHYROIDISM: ICD-10-CM

## 2025-01-08 DIAGNOSIS — E11.42 TYPE 2 DIABETES MELLITUS WITH DIABETIC POLYNEUROPATHY, WITHOUT LONG-TERM CURRENT USE OF INSULIN (HCC): ICD-10-CM

## 2025-01-08 DIAGNOSIS — B35.1 ONYCHOMYCOSIS: ICD-10-CM

## 2025-01-08 DIAGNOSIS — Z00.00 ROUTINE HEALTH MAINTENANCE: ICD-10-CM

## 2025-01-08 DIAGNOSIS — E78.2 MIXED HYPERLIPIDEMIA: ICD-10-CM

## 2025-01-08 DIAGNOSIS — F31.81 BIPOLAR II DISORDER (HCC): ICD-10-CM

## 2025-01-08 DIAGNOSIS — K21.9 GASTROESOPHAGEAL REFLUX DISEASE WITHOUT ESOPHAGITIS: ICD-10-CM

## 2025-01-08 DIAGNOSIS — Z12.31 ENCOUNTER FOR SCREENING MAMMOGRAM FOR BREAST CANCER: ICD-10-CM

## 2025-01-08 DIAGNOSIS — Z00.00 MEDICARE ANNUAL WELLNESS VISIT, SUBSEQUENT: ICD-10-CM

## 2025-01-08 PROCEDURE — 99999 PR NO CHARGE: CPT | Performed by: NURSE PRACTITIONER

## 2025-01-08 PROCEDURE — 90656 IIV3 VACC NO PRSV 0.5 ML IM: CPT

## 2025-01-08 PROCEDURE — 3078F DIAST BP <80 MM HG: CPT | Performed by: NURSE PRACTITIONER

## 2025-01-08 PROCEDURE — G0439 PPPS, SUBSEQ VISIT: HCPCS | Mod: 25 | Performed by: NURSE PRACTITIONER

## 2025-01-08 PROCEDURE — 3074F SYST BP LT 130 MM HG: CPT | Performed by: NURSE PRACTITIONER

## 2025-01-08 PROCEDURE — G0008 ADMIN INFLUENZA VIRUS VAC: HCPCS

## 2025-01-08 RX ORDER — TERBINAFINE HYDROCHLORIDE 250 MG/1
250 TABLET ORAL DAILY
Qty: 84 TABLET | Refills: 0 | Status: SHIPPED | OUTPATIENT
Start: 2025-01-08 | End: 2025-04-02

## 2025-01-08 ASSESSMENT — PATIENT HEALTH QUESTIONNAIRE - PHQ9: CLINICAL INTERPRETATION OF PHQ2 SCORE: 0

## 2025-01-08 ASSESSMENT — ENCOUNTER SYMPTOMS: GENERAL WELL-BEING: GOOD

## 2025-01-08 ASSESSMENT — ACTIVITIES OF DAILY LIVING (ADL): BATHING_REQUIRES_ASSISTANCE: 0

## 2025-01-08 ASSESSMENT — FIBROSIS 4 INDEX: FIB4 SCORE: 1.32

## 2025-01-08 NOTE — PROGRESS NOTES
Chief Complaint   Patient presents with    Medicare Annual Wellness    Lab Results   Verbal consent was acquired by the patient to use [employer] ambient listening note generation during this visit Yes      HPI:  Frances Delgado is a 59 y.o. here for Medicare Annual Wellness Visit     History of Present Illness  The patient is a 59-year-old female who presents for an annual wellness visit.    She has been experiencing discoloration in the corners of her nails, initially white but now brown due to over-the-counter treatment, persisting for approximately 4 months. Despite the treatment, the condition has not improved and appears to be worsening. She reports that the discoloration has not spread beyond the corners of the two affected nails. She also notes the presence of new lines growing in the affected area. A previous consultation with a podiatrist suggested a possible fungal infection, but no significant concern was raised. She was advised to apply Lotrimin 1 percent nightly, which she did consistently without complete resolution of the issue. The condition began to deteriorate about a year ago, with further spreading. She expresses concern due to her mother's similar condition affecting all toes.    She has been under the care of Dr. Palafox, an orthopedic specialist, for the past 2 years following a fall while playing pickleball. Initially diagnosed with a labral tear, later identified as bursitis, she has been experiencing severe pain that has rendered her unable to walk to the bathroom or sleep on the affected side for over a year. A subsequent referral to Dr. Palafox led to a diagnosis of SI joint issues. She has undergone extensive physical therapy, including water therapy, but reports that only steroid injections provide relief. She receives these injections every 4 months, one in her hip and one in her SI joint, which completely alleviate her symptoms until the effects wear off. She is currently scheduled for  2 more injections. She has been informed that the next step in her treatment plan, should the injections and physical therapy prove ineffective, is ablation, a procedure not covered by her insurance. If this also fails, surgery will be considered. She has not played pickleball for nearly 2 years.    She has a history of neuropathy and is currently on Ozempic 0.5 mg, Wellbutrin 450 mg, Prozac 10 mg, gabapentin 200 mg at bedtime, Lamictal 200 mg daily, levothyroxine 100 mcg daily, omeprazole 40 mg daily, rosuvastatin 10 mg daily, sumatriptan 100 mg as needed, and trazodone 100 mg daily. She is uncertain about the need for medication refills. She is due for a mammogram in 06/2025, an eye exam in 07/2025, and a colonoscopy in 2026. She plans to receive her COVID-19 booster vaccine in preparation for travel in 02/2025.    Supplemental Information  She has chronic depression and bipolar disorder. She sees Darcy Brown once a month.    FAMILY HISTORY  Her mother has a fungal infection on every toe.    MEDICATIONS  Current: Ozempic 0.5 mg, Wellbutrin 450 mg, Prozac 10 mg, gabapentin 200 mg at bedtime, Lamictal 200 mg daily, levothyroxine 100 mcg daily, omeprazole 40 mg daily, rosuvastatin 10 mg daily, sumatriptan 100 mg as needed, trazodone 100 mg daily.     Patient Active Problem List    Diagnosis Date Noted    Pain of left hip 09/28/2023    Migraine     Seborrheic keratosis 12/21/2022    Risk for falls 09/08/2022    Adhesive capsulitis of right shoulder 03/08/2022    Right leg weakness 04/16/2021    Restless leg syndrome 05/27/2020    Onychomycosis 05/27/2020    Right leg numbness 12/26/2019    Lipoma of torso 07/29/2019    Right hand pain 05/13/2019    Type 2 diabetes mellitus with diabetic polyneuropathy, without long-term current use of insulin (HCC) 01/02/2019    Peripheral polyneuropathy 01/02/2019    History of smoking at least 1 pack per day for at least 30 years 01/02/2019    Chronic toe pain, bilateral  06/21/2018    Acquired hypothyroidism 03/29/2018    Gastroesophageal reflux disease without esophagitis 03/29/2018    DORENE (generalized anxiety disorder) 12/06/2017    Ganglion cyst 10/04/2017    Elevated LFTs 05/23/2017    Chronic bilateral low back pain without sciatica 05/23/2017    Mixed hyperlipidemia 05/09/2017    Bipolar II disorder (HCC) 12/21/2016    Attention deficit hyperactivity disorder (ADHD) 12/21/2016     Current Outpatient Medications   Medication Sig Dispense Refill    terbinafine (LAMISIL) 250 MG Tab Take 1 Tablet by mouth every day for 84 days. 84 Tablet 0    gabapentin (NEURONTIN) 100 MG Cap Take 2 Capsules by mouth at bedtime. 180 Capsule 0    Semaglutide,0.25 or 0.5MG/DOS, (OZEMPIC, 0.25 OR 0.5 MG/DOSE,) 2 MG/3ML Solution Pen-injector Inject 0.5 mg under the skin every 7 days. 9 mL 0    omeprazole (PRILOSEC) 40 MG delayed-release capsule TAKE 1 CAPSULE BY MOUTH EVERY DAY 90 Capsule 0    levothyroxine (SYNTHROID) 100 MCG Tab Take 1 Tablet by mouth every morning on an empty stomach. Please complete annual fasting labs prior to appointment for further refills. 90 Tablet 0    rosuvastatin (CRESTOR) 10 MG Tab TAKE 1 TABLET BY MOUTH EVERY DAY IN THE EVENING 90 Tablet 3    FLUoxetine (PROZAC) 10 MG Cap       sumatriptan (IMITREX) 100 MG tablet Take 1 Tablet by mouth one time as needed for Migraine. 10 Tablet 11    buPROPion (WELLBUTRIN XL) 300 MG XL tablet       buPROPion (WELLBUTRIN XL) 150 MG XL tablet Take 150 mg by mouth every morning.      glucose blood (CONTOUR NEXT TEST) strip 1 Strip by Other route every day. Use to test blood sugar 1 time daily 100 Strip 3    lamotrigine (LAMICTAL) 200 MG tablet Take 200 mg by mouth every day.      traZODone (DESYREL) 100 MG Tab TAKE 1/2 TO 1 TABLET BY  MOUTH AT BEDTIME AS NEEDED  FOR SLEEP 90 Tab 0     No current facility-administered medications for this visit.          Current supplements as per medication list.     Allergies: Lisinopril    Current social  contact/activities: she likes to watch tv and spends time with her 2 cats.     She  reports that she quit smoking about 8 years ago. Her smoking use included cigarettes. She started smoking about 47 years ago. She has a 38.6 pack-year smoking history. She has never used smokeless tobacco. She reports current alcohol use. She reports that she does not use drugs.  Counseling given: Yes    ROS:    Gait: Uses no assistive device  Ostomy: No  Other tubes: No  Amputations: No  Chronic oxygen use: No  Last eye exam: 07/2024  Wears hearing aids: No   : Denies any urinary leakage during the last 6 months    Screening:    Depression Screening  Little interest or pleasure in doing things?  0 - not at all  Feeling down, depressed , or hopeless? 0 - not at all  Patient Health Questionnaire Score: 0     If depressive symptoms identified deferred to follow up visit unless specifically addressed in assessment and plan.    Interpretation of PHQ-9 Total Score   Score Severity   1-4 No Depression   5-9 Mild Depression   10-14 Moderate Depression   15-19 Moderately Severe Depression   20-27 Severe Depression    Screening for Cognitive Impairment  Do you or any of your friends or family members have any concern about your memory? No  Three Minute Recall (Leader, Season, Table) 3/3    Fidencio clock face with all 12 numbers and set the hands to show 10 minutes after 11.  Yes    Cognitive concerns identified deferred for follow up unless specifically addressed in assessment and plan.    Fall Risk Assessment  Has the patient had two or more falls in the last year or any fall with injury in the last year?  No    Safety Assessment  Do you always wear your seatbelt?  Yes  Any changes to home needed to function safely? No  Difficulty hearing.  No  Patient counseled about all safety risks that were identified.    Functional Assessment ADLs  Are there any barriers preventing you from cooking for yourself or meeting nutritional needs?  No.    Are  there any barriers preventing you from driving safely or obtaining transportation?  No.    Are there any barriers preventing you from using a telephone or calling for help?  No    Are there any barriers preventing you from shopping?  Yes.    Are there any barriers preventing you from taking care of your own finances?  No    Are there any barriers preventing you from managing your medications?  No    Are there any barriers preventing you from showering, bathing or dressing yourself? No    Are there any barriers preventing you from doing housework or laundry? No  Are there any barriers preventing you from using the toilet?No  Are you currently engaging in any exercise or physical activity?  Yes. Weights     Self-Assessment of Health  What is your perception of your health? Good    Do you sleep more than six hours a night? Yes    In the past 7 days, how much did pain keep you from doing your normal work? Some    Do you spend quality time with family or friends (virtually or in person)? Yes    Do you usually eat a heart healthy diet that constists of a variety of fruits, vegetables, whole grains and fiber? Yes    Do you eat foods high in fat and/or Fast Food more than three times per week? No    How concerned are you that your medical conditions are not being well managed? a little    Are you worried that in the next 2 months, you may not have stable housing that you own, rent, or stay in as part of a household? No      Advance Care Planning  Do you have an Advance Directive, Living Will, Durable Power of , or POLST? Yes    Living Will     is not on file - instructed patient to bring in a copy to scan into their chart    Health Maintenance Summary            Overdue - COVID-19 Vaccine (6 - 2024-25 season) Overdue since 9/1/2024 12/22/2023  Imm Admin: COVID-19, mRNA, LNP-S, PF, renee-sucrose, 30 mcg/0.3 mL    10/19/2022  Imm Admin: PFIZER BIVALENT SARS-COV-2 VACCINE (12+)    12/20/2021  Imm Admin: PFIZER  PURPLE CAP SARS-COV-2 VACCINATION (12+)    04/17/2021  Imm Admin: MODERNA SARS-COV-2 VACCINE (12+)    03/19/2021  Imm Admin: MODERNA SARS-COV-2 VACCINE (12+)    Only the first 5 history entries have been loaded, but more history exists.              Ordered - Mammogram (Yearly) Ordered on 1/8/2025 06/26/2024  MA-SCREENING MAMMO BILAT W/IMPLANTS W/FERDINAND W/CAD    09/28/2022  MA-SCREENING MAMMO BILAT W/IMPLANTS W/FERDINAND W/CAD    06/01/2021  MA-SCREENING MAMMO BILAT W/IMPLANTS W/FERDINAND W/CAD    02/04/2020  MA-MAMMO SCREEN BILAT IMPLANTS FERDINAND CAD    04/16/2018  MA-MAMMO SCREEN BILAT IMPLANTS FERDINAND CAD    Only the first 5 history entries have been loaded, but more history exists.              Ordered - A1c Screening (Every 6 Months) Ordered on 1/8/2025 01/04/2025  HEMOGLOBIN A1C    09/28/2023  HEMOGLOBIN A1C    09/06/2022  HEMOGLOBIN A1C    01/18/2022  POCT  A1C    09/21/2021  HEMOGLOBIN A1C    Only the first 5 history entries have been loaded, but more history exists.              Diabetes: Retinopathy Screening (Yearly) Next due on 7/30/2025 07/30/2024  RETINAL SCREENING RESULTS    05/08/2023  RETINAL SCREENING RESULTS    09/08/2022  Done - results in media    09/08/2020  POCT Retinal Eye Exam    05/13/2019  POCT Retinal Eye Exam    Only the first 5 history entries have been loaded, but more history exists.              Ordered - Fasting Lipid Profile (Yearly) Ordered on 1/8/2025 01/04/2025  Lipid Profile    09/28/2023  Lipid Profile    09/06/2022  Lipid Profile    09/21/2021  Lipid Profile    03/12/2021  Lipid Profile    Only the first 5 history entries have been loaded, but more history exists.              Ordered - Diabetes: Urine Protein Screening (Yearly) Ordered on 1/8/2025 01/04/2025  MICROALBUMIN CREAT RATIO URINE    10/09/2023  MICROALBUMIN CREAT RATIO URINE    09/06/2022  MICROALBUMIN CREAT RATIO URINE    03/12/2021  MICROALBUMIN CREAT RATIO URINE    05/21/2020  MICROALBUMIN CREAT RATIO  URINE (LAB COLLECT)    Only the first 5 history entries have been loaded, but more history exists.              Ordered - SERUM CREATININE (Yearly) Ordered on 1/8/2025 01/04/2025  Comp Metabolic Panel    09/28/2023  Comp Metabolic Panel    09/06/2022  Comp Metabolic Panel    03/12/2021  Comp Metabolic Panel    06/08/2020  Comp Metabolic Panel    Only the first 5 history entries have been loaded, but more history exists.              Annual Wellness Visit (Yearly) Next due on 1/8/2026 01/08/2025  Visit Dx: Medicare annual wellness visit, subsequent    01/08/2025  Subsequent Annual Wellness Visit - Includes PPPS ()    10/09/2023  Visit Dx: Medicare annual wellness visit, subsequent    10/09/2023  Subsequent Annual Wellness Visit - Includes PPPS ()    09/08/2022  Visit Dx: Medicare annual wellness visit, initial    Only the first 5 history entries have been loaded, but more history exists.              Diabetes: Monofilament / LE Exam (Yearly) Next due on 1/8/2026 01/08/2025  Diabetic Monofilament LE Exam    10/09/2023  Diabetic Monofilament LE Exam    10/09/2023  SmartData: WORKFLOW - DIABETES - DIABETIC FOOT EXAM PERFORMED    09/08/2022  Diabetic Monofilament LE Exam    09/08/2022  SmartData: WORKFLOW - DIABETES - DIABETIC FOOT EXAM PERFORMED    Only the first 5 history entries have been loaded, but more history exists.              Colorectal Cancer Screening (Colonoscopy - Preferred) Next due on 7/15/2026      07/15/2016  REFERRAL TO GI FOR COLONOSCOPY              IMM DTaP/Tdap/Td Vaccine (2 - Td or Tdap) Next due on 4/10/2033      04/10/2023  Imm Admin: Tdap Vaccine              Hepatitis C Screening  Completed      03/29/2018  Hepatitis C Antibody component of HEPATITIS PANEL ACUTE(4 COMPONENTS)              Lung Cancer Screening Shared Decision Making  Completed      01/14/2020  Level of Service: [place] COUNSELING LUNG CA SCREEN LDCT              Pneumococcal Vaccine: 0-64 Years (Series  Information) Completed      09/08/2022  Imm Admin: Pneumococcal Conjugate Vaccine (PCV20)    01/02/2019  Imm Admin: Pneumococcal polysaccharide vaccine (PPSV-23)              Zoster (Shingles) Vaccines (Series Information) Completed      10/19/2022  Imm Admin: Zoster Vaccine Recombinant (RZV) (SHINGRIX)    12/20/2021  Imm Admin: Zoster Vaccine Recombinant (RZV) (SHINGRIX)              Influenza Vaccine (Series Information) Completed      01/08/2025  Imm Admin: Influenza split virus trivalent (PF)    10/09/2023  Imm Admin: Influenza Vaccine Quad Inj (Pf)    11/01/2022  Imm Admin: Influenza Vaccine Quad Inj (Pf)    09/27/2021  Imm Admin: Influenza Vaccine Quad Inj (Pf)    10/01/2020  Imm Admin: Influenza Vac Subunit Quad Inj (Pf)    Only the first 5 history entries have been loaded, but more history exists.              Hepatitis A Vaccine (Hep A) (Series Information) Aged Out      No completion history exists for this topic.              HPV Vaccines (Series Information) Aged Out      No completion history exists for this topic.              Polio Vaccine (Inactivated Polio) (Series Information) Aged Out      No completion history exists for this topic.              Meningococcal Immunization (Series Information) Aged Out      No completion history exists for this topic.              Discontinued - Cervical Cancer Screening  Discontinued        Frequency changed to Never automatically (Topic No Longer Applies)    06/21/2018  THINPREP PAP WITH HPV    06/21/2018  PATHOLOGY GYN SPECIMEN              Discontinued - Hepatitis B Vaccine (Hep B)  Discontinued      No completion history exists for this topic.                  Patient Care Team:  KRISTOPHER GallegosRSARAH as PCP - General (Family Medicine)  KRISTOPHER OsorioRSARAH (Psychiatry & Neurology Psychiatry)    Social History     Tobacco Use    Smoking status: Former     Current packs/day: 0.00     Average packs/day: 1 pack/day for 38.6 years (38.6 ttl  pk-yrs)     Types: Cigarettes     Start date: 1978     Quit date: 2016     Years since quittin.3    Smokeless tobacco: Never   Vaping Use    Vaping status: Never Used   Substance Use Topics    Alcohol use: Yes     Comment: holidays    Drug use: No     Family History   Problem Relation Age of Onset    Bipolar disorder Father     Alcohol abuse Father         ETOH    Other Father         Cirrhosis    Diabetes Mother     Hypertension Mother     Other Mother         gilberts syndrome    Alcohol abuse Sister     Psychiatric Illness Sister     Drug abuse Sister     Alcohol abuse Maternal Grandmother     Other Maternal Grandmother         Brain Tumor    Diabetes Maternal Grandfather     Alzheimer's Disease Maternal Grandfather     Cancer Maternal Grandfather         Skin    Cancer Maternal Aunt         stomach    Diabetes Maternal Uncle     Alcohol abuse Maternal Uncle     Stroke Maternal Uncle     Diabetes Sister     Hyperlipidemia Sister     Anxiety disorder Sister     Other Sister         benign uterine tumor    Obesity Sister     Psychiatric Illness Sister     Obesity Sister     Psychiatric Illness Sister     Alcohol/Drug Maternal Uncle         prescription narcotics    Cancer Maternal Uncle      She  has a past medical history of ADD (attention deficit disorder), Bipolar 1 disorder (HCC), Bipolar disorder (HCC), Cervical cancer (HCC) (), Chronic back pain, Depression, Diabetes (HCC), Essential hypertension (2017), Head injury due to trauma (2022), Hyperlipidemia, Insomnia, Memory loss due to medical condition, Migraine, Overweight (BMI 25.0-29.9) (2022), and Thyroid disease.   Past Surgical History:   Procedure Laterality Date    HI BREAST AUGMENTATION WITH IMPLANT      ABDOMINAL HYSTERECTOMY TOTAL      total    LUMPECTOMY      PRIMARY C SECTION      TOE ARTHROPLASTY Right     cut tendons, fused joints. last surgery removed last joint of all toes except for big toe     Exam:  "  /60 (BP Location: Left arm, Patient Position: Sitting, BP Cuff Size: Adult)   Pulse 91   Temp 36.3 °C (97.4 °F) (Temporal)   Ht 1.727 m (5' 8\")   Wt 73.9 kg (163 lb)   SpO2 93%  Body mass index is 24.78 kg/m².    Hearing excellent.    Dentition good  Alert, oriented in no acute distress.  Eye contact is good, speech goal directed, affect calm    Monofilament testing with a 10 gram force: sensation intact: decreased bilaterally  Visual Inspection: Feet without maceration, ulcers, fissures.  Pedal pulses: intact bilaterally     Assessment and Plan. The following treatment and monitoring plan is recommended:    1. Medicare annual wellness visit, subsequent  - Subsequent Annual Wellness Visit - Includes PPPS ()    2. Bipolar II disorder (HCC)  Chronic, ongoing.  Continue to follow with psychiatry.  Continue Wellbutrin 450 mg, Prozac 10 mg, Gabapentin 200 mg at bedtime, Lamictal 200 mg daily, and trazodone 100 mg daily.     3. Type 2 diabetes mellitus with diabetic polyneuropathy, without long-term current use of insulin (HCC)  Chronic, ongoing.  Her A1c is well-controlled at 5.0%.  Continue Ozempic 0.5 mg weekly.  Monofilament completed today, decrease sensation bilaterally.  Continue gabapentin 200 mg nightly. Due for updated annual labs prior to annual follow-up in January 2026.  - Diabetic Monofilament LE Exam  - HEMOGLOBIN A1C; Future  - Comp Metabolic Panel; Future  - Lipid Profile; Future  - MICROALBUMIN CREAT RATIO URINE; Future    4. Mixed hyperlipidemia  Chronic, ongoing.  Her lipid profile is excellent.  Continue rosuvastatin 10 mg daily.  Due for updated annual labs prior to annual follow-up in January 2026.  - Comp Metabolic Panel; Future  - Lipid Profile; Future  - TSH WITH REFLEX TO FT4; Future    5. Acquired hypothyroidism  Chronic, ongoing.  Continue n levothyroxine 100 mcg daily. Due for updated annual labs prior to annual follow-up in January 2026.  - TSH WITH REFLEX TO FT4; " Future    6. Elevated LFTs  Due for updated annual labs prior to annual follow-up in January 2026.  - Comp Metabolic Panel; Future    7. Gastroesophageal reflux disease without esophagitis  Chronic, ongoing.  Continue omeprazole 40 mg daily. NDue for updated annual labs prior to annual follow-up in January 2026.  - CBC WITH DIFFERENTIAL; Future  - Comp Metabolic Panel; Future  - TSH WITH REFLEX TO FT4; Future    8. Onychomycosis  Chronic, ongoing.  A prescription for terbinafine 250 mg, to be taken once daily for a duration of 12 weeks, has been provided. She is advised to maintain adequate hydration and avoid the use of ibuprofen or Tylenol unless absolutely necessary due to potential liver impact.  She should not drink alcohol while taking this medication.  - terbinafine (LAMISIL) 250 MG Tab; Take 1 Tablet by mouth every day for 84 days.  Dispense: 84 Tablet; Refill: 0    9. Encounter for screening mammogram for breast cancer  Due for screening after 6/26/2025.  - MA-SCREENING MAMMO BILAT W/TOMOSYNTHESIS W/CAD; Future    10. Routine health maintenance  She is due for a mammogram in June 2025, an eye exam in July 2025, and a colonoscopy in 2026. She will receive her influenza vaccine today. She is advised to get her COVID-19 booster before traveling in February 2025. Labs will be repeated in 1 year.  - HEMOGLOBIN A1C; Future  - CBC WITH DIFFERENTIAL; Future  - Comp Metabolic Panel; Future  - Lipid Profile; Future  - MICROALBUMIN CREAT RATIO URINE; Future  - TSH WITH REFLEX TO FT4; Future    11. Need for vaccination  Given today.  - INFLUENZA VACCINE TRI INJ (PF)       Services suggested: No services needed at this time  Health Care Screening: Age-appropriate preventive services recommended by USPTF and ACIP covered by Medicare were discussed today. Services ordered if indicated and agreed upon by the patient.  Referrals offered: Community-based lifestyle interventions to reduce health risks and promote  self-management and wellness, fall prevention, nutrition, physical activity, tobacco-use cessation, weight loss, and mental health services as per orders if indicated.    Discussion today about general wellness and lifestyle habits:    Prevent falls and reduce trip hazards; Cautioned about securing or removing rugs.  Have a working fire alarm and carbon monoxide detector;   Engage in regular physical activity and social activities     Follow-up: Return in 1 year (on 1/8/2026) for AWV, Follow up Labs.     I have placed the below orders and discussed them with an approved delegating provider. The MA is performing the below orders under the direction of Dr. Quezada.      Please note that this dictation was created using voice recognition software. I have worked with consultants from the vendor as well as technical experts from [facility] [employer] to optimize the interface. I have made every reasonable attempt to correct obvious errors, but I expect that there are errors of grammar and possibly content that I did not discover before finalizing the note.

## 2025-01-16 DIAGNOSIS — E03.9 ACQUIRED HYPOTHYROIDISM: ICD-10-CM

## 2025-01-16 RX ORDER — LEVOTHYROXINE SODIUM 100 UG/1
100 TABLET ORAL
Qty: 90 TABLET | Refills: 3 | Status: SHIPPED | OUTPATIENT
Start: 2025-01-16

## 2025-01-17 NOTE — TELEPHONE ENCOUNTER
Requested Prescriptions     Pending Prescriptions Disp Refills    levothyroxine (SYNTHROID) 100 MCG Tab [Pharmacy Med Name: LEVOTHYROXINE 100 MCG TABLET] 90 Tablet 3     Sig: Take 1 Tablet by mouth every morning on an empty stomach.       DANIEL Gallegos.    No

## 2025-01-29 DIAGNOSIS — K21.9 GASTROESOPHAGEAL REFLUX DISEASE WITHOUT ESOPHAGITIS: ICD-10-CM

## 2025-01-29 RX ORDER — OMEPRAZOLE 40 MG/1
40 CAPSULE, DELAYED RELEASE ORAL DAILY
Qty: 90 CAPSULE | Refills: 3 | Status: SHIPPED | OUTPATIENT
Start: 2025-01-29

## 2025-01-29 NOTE — TELEPHONE ENCOUNTER
Requested Prescriptions     Pending Prescriptions Disp Refills    omeprazole (PRILOSEC) 40 MG delayed-release capsule [Pharmacy Med Name: OMEPRAZOLE DR 40 MG CAPSULE] 90 Capsule 3     Sig: TAKE 1 CAPSULE BY MOUTH EVERY DAY       DANIEL Gallegos.

## 2025-01-30 DIAGNOSIS — E11.42 TYPE 2 DIABETES MELLITUS WITH DIABETIC POLYNEUROPATHY, WITHOUT LONG-TERM CURRENT USE OF INSULIN (HCC): ICD-10-CM

## 2025-01-31 NOTE — TELEPHONE ENCOUNTER
Received request via: Pharmacy    Was the patient seen in the last year in this department? Yes    Does the patient have an active prescription (recently filled or refills available) for medication(s) requested? No    [facility]    Does the patient have nursing home Plus and need 100-day supply? (This applies to ALL medications) Patient does not have SCP

## 2025-02-02 RX ORDER — SEMAGLUTIDE 0.68 MG/ML
0.5 INJECTION, SOLUTION SUBCUTANEOUS
Qty: 9 ML | Refills: 3 | Status: SHIPPED | OUTPATIENT
Start: 2025-02-02

## 2025-02-03 NOTE — TELEPHONE ENCOUNTER
Requested Prescriptions     Pending Prescriptions Disp Refills    Semaglutide,0.25 or 0.5MG/DOS, (OZEMPIC, 0.25 OR 0.5 MG/DOSE,) 2 MG/3ML Solution Pen-injector [Pharmacy Med Name: OZEMPIC 0.25-0.5 MG/DOSE PEN] 9 mL 3     Sig: INJECT 0.5MG UNDER THE SKIN EVERY 7 DAYS       DANIEL Gallegos.

## 2025-03-05 ENCOUNTER — OFFICE VISIT (OUTPATIENT)
Dept: MEDICAL GROUP | Facility: PHYSICIAN GROUP | Age: 60
End: 2025-03-05
Payer: MEDICARE

## 2025-03-05 VITALS
DIASTOLIC BLOOD PRESSURE: 80 MMHG | BODY MASS INDEX: 24.46 KG/M2 | HEART RATE: 116 BPM | WEIGHT: 161.4 LBS | OXYGEN SATURATION: 98 % | TEMPERATURE: 97.3 F | HEIGHT: 68 IN | SYSTOLIC BLOOD PRESSURE: 128 MMHG

## 2025-03-05 DIAGNOSIS — Z09 HOSPITAL DISCHARGE FOLLOW-UP: ICD-10-CM

## 2025-03-05 DIAGNOSIS — Z96.641 STATUS POST RIGHT HIP REPLACEMENT: ICD-10-CM

## 2025-03-05 PROCEDURE — 99214 OFFICE O/P EST MOD 30 MIN: CPT | Performed by: NURSE PRACTITIONER

## 2025-03-05 PROCEDURE — 3079F DIAST BP 80-89 MM HG: CPT | Performed by: NURSE PRACTITIONER

## 2025-03-05 PROCEDURE — 3074F SYST BP LT 130 MM HG: CPT | Performed by: NURSE PRACTITIONER

## 2025-03-05 ASSESSMENT — FIBROSIS 4 INDEX: FIB4 SCORE: 1.32

## 2025-03-06 ASSESSMENT — ENCOUNTER SYMPTOMS
CARDIOVASCULAR NEGATIVE: 1
GASTROINTESTINAL NEGATIVE: 1
ARTHRALGIAS: 1
ENDOCRINE NEGATIVE: 1
FATIGUE: 1
HEMATOLOGIC/LYMPHATIC NEGATIVE: 1
PSYCHIATRIC NEGATIVE: 1
ACTIVITY CHANGE: 1
RESPIRATORY NEGATIVE: 1
EYES NEGATIVE: 1
ALLERGIC/IMMUNOLOGIC NEGATIVE: 1

## 2025-03-07 NOTE — PROGRESS NOTES
"Verbal consent was acquired by the patient to use [employer] ambient listening note generation during this visit Yes      Subjective   Frances Delgado is a 59 y.o. female who presents for hospital follow-up.  History of Present Illness  The patient presents for evaluation of hip pain. She is accompanied by her .    She sustained a hip fracture on the same day as her mother, necessitating a hip replacement surgery. The surgery was performed posteriorly due to the nature of the fracture. She was subsequently transferred to a rehabilitation facility where she stayed for 2 weeks. She has been home for 3 weeks now and is receiving outpatient physical therapy. She has been ambulating with a cane within her home for several days. She reports severe pain at night, which she manages by sleeping on her back or with a pillow between her legs when lying on her side. She anticipates being able to use the cane consistently by the 6th week post-surgery. She also reports no swelling, heat, or oozing from the surgical site. She inquires about the possibility of resuming driving.    Review of Systems   Constitutional:  Positive for activity change and fatigue.   HENT: Negative.     Eyes: Negative.    Respiratory: Negative.     Cardiovascular: Negative.    Gastrointestinal: Negative.    Endocrine: Negative.    Genitourinary: Negative.    Musculoskeletal:  Positive for arthralgias and gait problem.   Skin: Negative.    Allergic/Immunologic: Negative.    Hematological: Negative.    Psychiatric/Behavioral: Negative.       Objective   /80 (BP Location: Right arm, Patient Position: Sitting, BP Cuff Size: Adult)   Pulse (!) 116   Temp 36.3 °C (97.3 °F) (Temporal)   Ht 1.727 m (5' 8\")   Wt 73.2 kg (161 lb 6.4 oz)   SpO2 98%   Physical Exam  General: Well nourished, well developed female in NAD, awake and conversant.  Eyes: Normal conjunctiva, anicteric.  Round symmetrical pupils.  ENT: Hearing grossly intact.  No nasal " discharge.  Neck: Neck is supple.  No masses or thyromegaly.  CV: No lower extremity edema.  Respiratory: Respirations are nonlabored.  No wheezing.  Abdomen: Non-Distended.  Skin: Warm.  No rashes or ulcers.  MSK: Normal ambulation.  No clubbing or cyanosis.  Neuro: Sensation and CN II-XII grossly normal.  Psych: Alert and oriented.  Cooperative, appropriate mood and affect, normal judgment.      Assessment & Plan  1. Hospital discharge follow-up  2. Status post right hip replacement  New to examiner.  She is currently 5 weeks post-surgery and is demonstrating satisfactory progress. She has been walking in the house with a cane for a few days and using a walker out of the house and is experiencing pain at night that she is managing with positioning and a pillow. She is advised to persist with her physical therapy regimen. It is recommended that she refrain from driving until released by her surgeon.  Continue to follow with orthopedic surgery as scheduled.    Return in about 10 months (around 1/8/2026) for AWV.      Please note that this dictation was created using voice recognition software. I have made every reasonable attempt to correct obvious errors, but I expect that there are errors of grammar and possibly content that I did not discover before finalizing the note.

## 2025-03-13 DIAGNOSIS — E78.2 MIXED HYPERLIPIDEMIA: ICD-10-CM

## 2025-03-13 RX ORDER — ROSUVASTATIN CALCIUM 10 MG/1
10 TABLET, COATED ORAL EVERY EVENING
Qty: 90 TABLET | Refills: 3 | Status: SHIPPED | OUTPATIENT
Start: 2025-03-13

## 2025-06-13 NOTE — ASSESSMENT & PLAN NOTE
Chronic, ongoing.  Continues gabapentin 200 mg nightly, continues to feel that the medication made a significant improvement in the pain and itching from neuropathy.   Detail Level: Detailed